# Patient Record
Sex: FEMALE | Race: WHITE | Employment: OTHER | URBAN - METROPOLITAN AREA
[De-identification: names, ages, dates, MRNs, and addresses within clinical notes are randomized per-mention and may not be internally consistent; named-entity substitution may affect disease eponyms.]

---

## 2017-04-03 ENCOUNTER — HOSPITAL ENCOUNTER (OUTPATIENT)
Dept: CT IMAGING | Age: 27
Discharge: HOME | End: 2017-04-03
Attending: OPTOMETRIST
Payer: MEDICAID

## 2017-04-03 ENCOUNTER — APPOINTMENT (OUTPATIENT)
Dept: CT IMAGING | Age: 27
End: 2017-04-03
Attending: OPTOMETRIST
Payer: MEDICAID

## 2017-04-03 DIAGNOSIS — H49.01 THIRD NERVE PALSY OF RIGHT EYE: ICD-10-CM

## 2017-04-03 PROCEDURE — 70496 CT ANGIOGRAPHY HEAD: CPT

## 2017-04-03 PROCEDURE — 74011000258 HC RX REV CODE- 258: Performed by: OPTOMETRIST

## 2017-04-03 PROCEDURE — 74011636320 HC RX REV CODE- 636/320: Performed by: OPTOMETRIST

## 2017-04-03 RX ORDER — SODIUM CHLORIDE 0.9 % (FLUSH) 0.9 %
10 SYRINGE (ML) INJECTION
Status: COMPLETED | OUTPATIENT
Start: 2017-04-03 | End: 2017-04-03

## 2017-04-03 RX ADMIN — IOPAMIDOL 100 ML: 755 INJECTION, SOLUTION INTRAVENOUS at 15:12

## 2017-04-03 RX ADMIN — Medication 10 ML: at 15:12

## 2017-04-03 RX ADMIN — SODIUM CHLORIDE 100 ML: 900 INJECTION, SOLUTION INTRAVENOUS at 15:12

## 2017-09-27 ENCOUNTER — INITIAL PRENATAL (OUTPATIENT)
Dept: MIDWIFE SERVICES | Age: 27
End: 2017-09-27

## 2017-09-27 VITALS
SYSTOLIC BLOOD PRESSURE: 132 MMHG | HEART RATE: 98 BPM | HEIGHT: 62 IN | DIASTOLIC BLOOD PRESSURE: 84 MMHG | BODY MASS INDEX: 31.47 KG/M2 | WEIGHT: 171 LBS

## 2017-09-27 DIAGNOSIS — Z34.81 ENCOUNTER FOR SUPERVISION OF NORMAL PREGNANCY IN MULTIGRAVIDA IN FIRST TRIMESTER: Primary | ICD-10-CM

## 2017-09-27 PROBLEM — Z34.80 NORMAL PREGNANCY IN MULTIGRAVIDA: Status: ACTIVE | Noted: 2017-09-27

## 2017-09-27 LAB
BILIRUB UR QL STRIP: NEGATIVE
GLUCOSE UR-MCNC: NEGATIVE MG/DL
HCG URINE, QL. (POC): POSITIVE
KETONES P FAST UR STRIP-MCNC: NEGATIVE MG/DL
PH UR STRIP: 7.5 [PH] (ref 4.6–8)
PROT UR QL STRIP: NEGATIVE MG/DL
SP GR UR STRIP: 1.01 (ref 1–1.03)
UA UROBILINOGEN AMB POC: NORMAL (ref 0.2–1)
URINALYSIS CLARITY POC: CLEAR
URINALYSIS COLOR POC: YELLOW
URINE BLOOD POC: NEGATIVE
URINE LEUKOCYTES POC: NEGATIVE
URINE NITRITES POC: NEGATIVE
VALID INTERNAL CONTROL?: YES

## 2017-09-27 NOTE — PROGRESS NOTES
Subjective:      Karen Stone is a 32 y.o.  11w2d being seen today for her first obstetrical visit. This is an unprevented pregnancy. She stopped taking her birth control pill 3 months ago and did not start anything else. Estimated Date of Delivery: 18 by sure LMP of 07/10/2017. She is sure of this date and is also sure of conception date which aligns with LMP date. FOB unable to attend with her today. Her last pap was in 2016. She denies ever having an abnormal pap. She denies any recent travel to a zika infected area. Current Medications  PNV        Obstetrical Hx  OB History      Para Term  AB Living    3 2 2   2    SAB TAB Ectopic Molar Multiple Live Births         2        Both pregnancies were IOL at 41w3d. Denies any complications. Denies postpartum hemorrhage. Medical Hx  History reviewed. No pertinent past medical history. 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 Her  has just opened a [employer] restaurant in [facility]. She is in a safe, stable relationship. She denies any alcohol, cigarette or illegal drug use. Objective:     General  alert, no distress    Thyroid  not enlarged, symmetric, no tenderness/mass/nodules    Lungs  clear to auscultation bilaterally    Breasts  no masses, tenderness, nipples intact    Heart  regular rate and rhythm, S1, S2 normal, no murmur, click, rub or gallop    Abdomen  soft, non-tender. No masses, No organomegaly.     Pelvic exam: deferred         Assessment:   32 y.o.  @ 11w2d   Sure LMP  Size=Dates    Plan:     Labs: NOB panel  Prenatal vitamins. Problem list reviewed and updated. Genetic screening options reviewed. At this time the patient declines. Will consider NIPS after 20 week anatomy scan, if any markers are identified then. Centering pregnancy offered. At this time the patient would like to go to session 8 or 9. She is confirming  before committing. Midwifery care/philosophy discussed including MD collaboration  New OB packet given and reviewed, including nutrition, exercise, what to expect at prenatal visits, common complaints, danger signs and practice info.    Zika precautions reviewed  Follow-up in 4 weeks or sooner as needed

## 2017-09-27 NOTE — PROGRESS NOTES
Chief Complaint   Patient presents with    Pregnancy     Visit Vitals    /84    Pulse 98    Ht 5' 2\" (1.575 m)    Wt 171 lb (77.6 kg)    BMI 31.28 kg/m2     1. Have you been to the ER, urgent care clinic since your last visit? Hospitalized since your last visit? No    2. Have you seen or consulted any other health care providers outside of the [address] [place] [address] since your last visit? Include any pap smears or colon screening.  No     Here today for first visit of pregnancy      Verbal order for urine  Culture and G/C per rose marie anders cnm

## 2017-09-27 NOTE — MR AVS SNAPSHOT
Visit Information Date & Time Provider Department Dept. Phone Encounter #  
 9/27/2017 11:00 AM Hobelem CruzMervin [id number] Follow-up Instructions Return in about 4 weeks (around 10/25/2017). Follow-up and Disposition History Upcoming Health Maintenance Date Due  
 PAP AKA CERVICAL CYTOLOGY 9/23/2009 INFLUENZA AGE 9 TO ADULT 8/1/2017 Allergies as of 9/27/2017  Review Complete On: 9/27/2017 By: Ho Cruz CNM Severity Noted Reaction Type Reactions Bee Sting [Sting, Bee] High 09/27/2017    Swelling Current Immunizations  Never Reviewed No immunizations on file. Not reviewed this visit You Were Diagnosed With   
  
 Codes Comments Encounter for supervision of normal pregnancy in multigravida in first trimester    -  Primary ICD-10-CM: Z34.81 ICD-9-CM: V22.1 Vitals BP Pulse Height(growth percentile) Weight(growth percentile) LMP BMI  
 132/84 98 5' 2\" (1.575 m) 171 lb (77.6 kg) 07/10/2017 31.28 kg/m2 OB Status Smoking Status Pregnant Current Some Day Smoker Vitals History BMI and BSA Data Body Mass Index Body Surface Area  
 31.28 kg/m 2 1.84 m 2 Your Updated Medication List  
  
   
This list is accurate as of: 9/27/17  1:18 PM.  Always use your most recent med list.  
  
  
  
  
 PRENATAL PO Take  by mouth. We Performed the Following AMB POC URINALYSIS DIP STICK AUTO W/O MICRO [04221 CPT(R)] AMB POC URINE PREGNANCY TEST, VISUAL COLOR COMPARISON [55013 CPT(R)] CBC WITH AUTOMATED DIFF [54890 CPT(R)] CHLAMYDIA/GC PCR [57400 CPT(R)] CULTURE, URINE Z4805042 CPT(R)] HEP B SURFACE AG [id number] CPT(R)] HIV 1/2 AG/AB, 4TH GENERATION,W RFLX CONFIRM [[id number] Custom] RUBELLA AB, IGG [id number] CPT(R)] T PALLIDUM SCREEN W/REFLEX [[id number] Custom] TYPE & SCREEN [[id number] Custom] Comments: ENTER SURGERY DATE IF FOR PRE-OP TESTING. VITAMIN D, 25 HYDROXY [id number] CPT(R)] Follow-up Instructions Return in about 4 weeks (around 10/25/2017). Patient Instructions Helpful Hints for Decreasing Nausea and Vomiting 
  
Eat small frequent meals (every 2 to 3 hours) Try easily digested carbohydrates such as fruit, juice, rice, potatoes, cereal, bread, crackers and pasta (Well worry about a healthy, balanced diet once the nausea resolves!) Avoid high fat foods (such as butter and gravy) Avoid highly seasoned or spicy foods Drink liquids between meals, rather than with them Eat a snack with protein before bed, and try a carbohydrate snack before getting out of bed (such as dry cereal or crackers that can be stored on your night stand) Limit caffeine and mints (they stimulate gastric secretions) Avoid strong smells Stay sitting up after you eat to let the food digest and reduce heartburn Get up slowly Do not brush your teeth right after you eat, also do not brush first thing in the morning Use a small, soft headed toothbrush and low foam toothpaste to decrease the gag reflex Try Arlin tea or capsules (250 mg four times a day); most commercial ginger ales do not contain real arlin Try Vitamin B6 (100 mg twice a day or 50mg three times a day in addition to the amount in the prenatal vitamin) In addition to Vitamin B6, try doxylamine 1 tablet at bedtime (available in the sleep aid aisle) Try Acupressure bands (like sea-bands) If unable to tolerate prenatal vitamin and/or DHA, take before bed, if still unable to tolerate switch to 2 chewable [place] vitamins daily before bed When you do feel nauseated: rest, slowly sip soda or sports drinks, avoid solids, and open a window or try a fan Call your provider if you can not hold anything down, fluid or food, for 24 hours Introducing hospitals & HEALTH SERVICES!    
 Dereje Whelan introduces Delectable patient portal. Now you can access parts of your medical record, email your doctor's office, and request medication refills online. 1. In your internet browser, go to [email or web address] 2. Click on the First Time User? Click Here link in the Sign In box. You will see the New Member Sign Up page. 3. Enter your Malauzai Software Access Code exactly as it appears below. You will not need to use this code after youve completed the sign-up process. If you do not sign up before the expiration date, you must request a new code. · Malau[employer]ai [employer] Access Code: [email or web address] Expires: 12/26/2017 10:46 AM 
 
4. Enter the last four digits of your Social Security Number (xxxx) and Date of Birth (mm/dd/yyyy) as indicated and click Submit. You will be taken to the next sign-up page. 5. Create a Malauzai Software ID. This will be your Mal[employer] login ID and cannot be changed, so think of one that is secure and easy to remember. 6. Create a Malau[employer]ai [employer] password. You can change your password at any time. 7. Enter your Password Reset Question and Answer. This can be used at a later time if you forget your password. 8. Enter your e-mail address. You will receive e-mail notification when new information is available in [address] 9. Click Sign Up. You can now view and download portions of your medical record. 10. Click the Download Summary menu link to download a portable copy of your medical information. If you have questions, please visit the Frequently Asked Questions section of the Malau[employer]ai [employer] website. Remember, Malauzai Software is NOT to be used for urgent needs. For medical emergencies, dial 911. Now available from your iPhone and Android! Please provide this summary of care documentation to your next provider. Your primary care clinician is listed as NONE. If you have any questions after today's visit, please call [phone].

## 2017-09-27 NOTE — PATIENT INSTRUCTIONS
Helpful Hints for Decreasing Nausea and Vomiting     Eat small frequent meals (every 2 to 3 hours)  Try easily digested carbohydrates such as fruit, juice, rice, potatoes, cereal, bread, crackers and pasta (Well worry about a healthy, balanced diet once the nausea resolves!)  Avoid high fat foods (such as butter and gravy)  Avoid highly seasoned or spicy foods  Drink liquids between meals, rather than with them  Eat a snack with protein before bed, and try a carbohydrate snack before getting out of bed (such as dry cereal or crackers that can be stored on your night stand)  Limit caffeine and mints (they stimulate gastric secretions)  Avoid strong smells  Stay sitting up after you eat to let the food digest and reduce heartburn  Get up slowly  Do not brush your teeth right after you eat, also do not brush first thing in the morning  Use a small, soft headed toothbrush and low foam toothpaste to decrease the gag reflex  Try Arlin tea or capsules (250 mg four times a day); most commercial ginger ales do not contain real arlin  Try Vitamin B6 (100 mg twice a day or 50mg three times a day in addition to the amount in the prenatal vitamin)  In addition to Vitamin B6, try doxylamine 1 tablet at bedtime (available in the sleep aid aisle)  Try Acupressure bands (like sea-bands)   If unable to tolerate prenatal vitamin and/or DHA, take before bed, if still unable to tolerate switch to 2 chewable [place] vitamins daily before bed  When you do feel nauseated: rest, slowly sip soda or sports drinks, avoid solids, and open a window or try a fan   Call your provider if you can not hold anything down, fluid or food, for 24 hours

## 2017-09-29 LAB
BACTERIA UR CULT: NORMAL
C TRACH RRNA SPEC QL NAA+PROBE: NEGATIVE
N GONORRHOEA RRNA SPEC QL NAA+PROBE: NEGATIVE

## 2017-10-10 ENCOUNTER — DOCUMENTATION ONLY (OUTPATIENT)
Dept: OBGYN CLINIC | Age: 27
End: 2017-10-10

## 2017-10-12 ENCOUNTER — DOCUMENTATION ONLY (OUTPATIENT)
Dept: MIDWIFE SERVICES | Age: 27
End: 2017-10-12

## 2017-10-12 DIAGNOSIS — Z34.80 NORMAL PREGNANCY IN MULTIGRAVIDA: ICD-10-CM

## 2017-10-12 NOTE — PROGRESS NOTES
Transfer records from [facility] reviewed. Last pap 03/21/2013. Patient states she had pap done last year. Check with patient to see if she had it done else where. Otherwise is due for pap at postpartum visit.

## 2017-10-25 ENCOUNTER — ROUTINE PRENATAL (OUTPATIENT)
Dept: MIDWIFE SERVICES | Age: 27
End: 2017-10-25

## 2017-10-25 VITALS
WEIGHT: 168.5 LBS | BODY MASS INDEX: 31.01 KG/M2 | SYSTOLIC BLOOD PRESSURE: 122 MMHG | HEIGHT: 62 IN | DIASTOLIC BLOOD PRESSURE: 84 MMHG | HEART RATE: 102 BPM

## 2017-10-25 DIAGNOSIS — Z34.80 NORMAL PREGNANCY IN MULTIGRAVIDA: Primary | ICD-10-CM

## 2017-10-25 LAB
25(OH)D3+25(OH)D2 SERPL-MCNC: 31.5 NG/ML (ref 30–100)
ABO GROUP BLD: NORMAL
BASOPHILS # BLD AUTO: 0 X10E3/UL (ref 0–0.2)
BASOPHILS NFR BLD AUTO: 0 %
BLD GP AB SCN SERPL QL: NEGATIVE
EOSINOPHIL # BLD AUTO: 0.3 X10E3/UL (ref 0–0.4)
EOSINOPHIL NFR BLD AUTO: 2 %
ERYTHROCYTE [DISTWIDTH] IN BLOOD BY AUTOMATED COUNT: 14 % (ref 12.3–15.4)
HBV SURFACE AG SERPL QL IA: NEGATIVE
HCT VFR BLD AUTO: 38.4 % (ref 34–46.6)
HGB BLD-MCNC: 13.3 G/DL (ref 11.1–15.9)
HIV 1+2 AB+HIV1 P24 AG SERPL QL IA: NON REACTIVE
IMM GRANULOCYTES # BLD: 0.1 X10E3/UL (ref 0–0.1)
IMM GRANULOCYTES NFR BLD: 1 %
LYMPHOCYTES # BLD AUTO: 2.8 X10E3/UL (ref 0.7–3.1)
LYMPHOCYTES NFR BLD AUTO: 22 %
MCH RBC QN AUTO: 32.4 PG (ref 26.6–33)
MCHC RBC AUTO-ENTMCNC: 34.6 G/DL (ref 31.5–35.7)
MCV RBC AUTO: 94 FL (ref 79–97)
MONOCYTES # BLD AUTO: 0.7 X10E3/UL (ref 0.1–0.9)
MONOCYTES NFR BLD AUTO: 6 %
NEUTROPHILS # BLD AUTO: 8.5 X10E3/UL (ref 1.4–7)
NEUTROPHILS NFR BLD AUTO: 69 %
PLATELET # BLD AUTO: 317 X10E3/UL (ref 150–379)
RBC # BLD AUTO: 4.1 X10E6/UL (ref 3.77–5.28)
RH BLD: POSITIVE
RUBV IGG SERPL IA-ACNC: 2.64 INDEX
T PALLIDUM AB SER QL IA: NEGATIVE
WBC # BLD AUTO: 12.3 X10E3/UL (ref 3.4–10.8)

## 2017-10-25 NOTE — PROGRESS NOTES
Chief Complaint   Patient presents with    Routine Prenatal Visit     15w2d     Visit Vitals    /84    Pulse (!) 102    Ht 5' 2\" (1.575 m)    Wt 168 lb 8 oz (76.4 kg)    BMI 30.82 kg/m2     1. Have you been to the ER, urgent care clinic since your last visit? Hospitalized since your last visit? No    2. Have you seen or consulted any other health care providers outside of the [address] [place] [address] since your last visit? Include any pap smears or colon screening. No     Here today for a routine prenatal visit and she has no complaints or concerns.

## 2017-10-25 NOTE — PROGRESS NOTES
S: Feeling well. No significant complaints or concerns. Reports consistent, daily fetal mvmt. Denies ctxs, LOF, or vaginal bldng. Did travel to [facility] Oct 8th, denies mosquito bites or symptoms. Was seen at PCP recently and diagnosed with virus. Son also sick. Patient c/o nose running and coughing up mucous in the morning. Denies being up with coughing, throat not hurting anymore, is having headaches (frontal). No fever. Drinking well but not eating well. Morning sickness now resolved. O: See prenatal flowsheet for maternal and fetal exam  Blood pressure 122/84, pulse (!) 102, height 5' 2\" (1.575 m), weight 168 lb 8 oz (76.4 kg), last menstrual period 07/10/2017.     A:  15w2d  Size=Dates  Viral head cold    P: Recommend flu vaccine when well- pt states she plans to decline  Discussed OTC Tylenol products for symptom relief, humidifier in bedroom, saline nose drops and/or nettipot  Recommend increasing Vitamin D, level low normal  Anatomy scan scheduled at [facility] 9038 4 weeks  See prenatal checklist for other topics covered during today's visit  RTO in 4 weeks for CHRISTEN with OLIVER

## 2017-10-25 NOTE — MR AVS SNAPSHOT
Visit Information Date & Time Provider Department Dept. Phone Encounter #  
 10/25/2017 11:00 AM Gretchen Zacarias, [facility] [address] [id number] Upcoming Health Maintenance Date Due  
 PAP AKA CERVICAL CYTOLOGY 9/23/2009 INFLUENZA AGE 9 TO ADULT 8/1/2017 Allergies as of 10/25/2017  Review Complete On: 10/25/2017 By: Gretchen Zacarias CNM Severity Noted Reaction Type Reactions Bee Sting [Sting, Bee] High 09/27/2017    Swelling Current Immunizations  Never Reviewed No immunizations on file. Not reviewed this visit You Were Diagnosed With   
  
 Codes Comments Normal pregnancy in multigravida    -  Primary ICD-10-CM: Z34.80 ICD-9-CM: V22.1 Vitals BP Pulse Height(growth percentile) Weight(growth percentile) LMP BMI  
 122/84 (!) 102 5' 2\" (1.575 m) 168 lb 8 oz (76.4 kg) 07/10/2017 30.82 kg/m2 OB Status Smoking Status Pregnant Current Some Day Smoker BMI and BSA Data Body Mass Index Body Surface Area  
 30.82 kg/m 2 1.83 m 2 Your Updated Medication List  
  
   
This list is accurate as of: 10/25/17 11:44 AM.  Always use your most recent med list.  
  
  
  
  
 PRENATAL PO Take  by mouth. Patient Instructions Vitamin D3 2000- 4000 international units-  Daily in addition to prenatal vitamin Introducing [facility] & HEALTH SERVICES! Nicci Garcia introduces [employer] patient portal. Now you can access parts of your medical record, email your doctor's office, and request medication refills online. 1. In your internet browser, go to [email or web address] 2. Click on the First Time User? Click Here link in the Sign In box. You will see the New Member Sign Up page. 3. Enter your [employer] Access Code exactly as it appears below. You will not need to use this code after youve completed the sign-up process.  If you do not sign up before the expiration date, you must request a new code. · Redux [employer] Access Code: [email or web address] Expires: 12/26/2017 10:46 AM 
 
4. Enter the last four digits of your Social Security Number (xxxx) and Date of Birth (mm/dd/yyyy) as indicated and click Submit. You will be taken to the next sign-up page. 5. Create a Redux Technologies ID. This will be your Redux [employer] login ID and cannot be changed, so think of one that is secure and easy to remember. 6. Create a [employer] password. You can change your password at any time. 7. Enter your Password Reset Question and Answer. This can be used at a later time if you forget your password. 8. Enter your e-mail address. You will receive e-mail notification when new information is available in [address] 9. Click Sign Up. You can now view and download portions of your medical record. 10. Click the Download Summary menu link to download a portable copy of your medical information. If you have questions, please visit the Frequently Asked Questions section of the [employer] website. Remember, [employer] is NOT to be used for urgent needs. For medical emergencies, dial 911. Now available from your iPhone and Android! Please provide this summary of care documentation to your next provider. Your primary care clinician is listed as NONE. If you have any questions after today's visit, please call [phone].

## 2017-11-22 ENCOUNTER — ROUTINE PRENATAL (OUTPATIENT)
Dept: MIDWIFE SERVICES | Age: 27
End: 2017-11-22

## 2017-11-22 VITALS
HEIGHT: 62 IN | WEIGHT: 171.5 LBS | HEART RATE: 120 BPM | DIASTOLIC BLOOD PRESSURE: 78 MMHG | SYSTOLIC BLOOD PRESSURE: 123 MMHG | BODY MASS INDEX: 31.56 KG/M2

## 2017-11-22 DIAGNOSIS — Z34.80 ENCOUNTER FOR SUPERVISION OF NORMAL PREGNANCY IN MULTIGRAVIDA, ANTEPARTUM: Primary | ICD-10-CM

## 2017-11-22 NOTE — PROGRESS NOTES
S: Feeling well. No significant complaints or concerns. Reports consistent, daily fetal mvmt, less then with daughter, likely due to anterior placenta this time. Denies ctxs, LOF, or vaginal bldng. Denies travel to [facility] affected area. O: See prenatal flowsheet for maternal and fetal exam  Blood pressure 123/78, pulse (!) 120, height 5' 2\" (1.575 m), weight 171 lb 8 oz (77.8 kg), last menstrual period 07/10/2017. Discussed recommendation for flu vaccination during pregnancy including flu is more likely to cause severe illness in pregnant women than in women who are not pregnant,  getting a flu shot is the first and most important step in protecting against flu, the flu shot given during pregnancy has been shown to protect both the mother and her baby (up to 7 months old) from flu, and flu shots are a safe way to protect the mother and her unborn child from serious illness and complications of flu. Pt declines flu vaccine today. A:  19w2d   Size=Dates    P:  Labs reviewed. AFP/ genetics declined. Ultrasound report reviewed.   See prenatal checklist for other topics covered during today's visit  RTO in  4weeks for CHRISTEN with OLIVER

## 2017-11-22 NOTE — PROGRESS NOTES
Chief Complaint   Patient presents with    Routine Prenatal Visit     19w2d     Visit Vitals    /78    Pulse (!) 120    Ht 5' 2\" (1.575 m)    Wt 171 lb 8 oz (77.8 kg)    BMI 31.37 kg/m2     1. Have you been to the ER, urgent care clinic since your last visit? Hospitalized since your last visit? No    2. Have you seen or consulted any other health care providers outside of the [address] [place] [address] since your last visit? Include any pap smears or colon screening. No     Here today for a routine prenatal visit and she has no complaints or concerns.

## 2017-11-22 NOTE — MR AVS SNAPSHOT
Visit Information Date & Time Provider Department Dept. Phone Encounter #  
 11/22/2017  1:30 PM Denise Cass, [address] [facility] [place] [phone] Upcoming Health Maintenance Date Due  
 PAP AKA CERVICAL CYTOLOGY 9/23/2009 Influenza Age 5 to Adult 8/1/2017 Allergies as of 11/22/2017  Review Complete On: 11/22/2017 By: Denise Odell CNM Severity Noted Reaction Type Reactions Bee Sting [Sting, Bee] High 09/27/2017    Swelling Current Immunizations  Never Reviewed No immunizations on file. Not reviewed this visit You Were Diagnosed With   
  
 Codes Comments Encounter for supervision of normal pregnancy in multigravida, antepartum    -  Primary ICD-10-CM: Z34.80 ICD-9-CM: V22.1 Vitals BP Pulse Height(growth percentile) Weight(growth percentile) LMP BMI  
 123/78 (!) 120 5' 2\" (1.575 m) 171 lb 8 oz (77.8 kg) 07/10/2017 31.37 kg/m2 OB Status Smoking Status Pregnant Former Smoker BMI and BSA Data Body Mass Index Body Surface Area  
 31.37 kg/m 2 1.84 m 2 Your Updated Medication List  
  
   
This list is accurate as of: 11/22/17  2:06 PM.  Always use your most recent med list.  
  
  
  
  
 PRENATAL PO Take  by mouth. Introducing [facility] & HEALTH SERVICES! [facility] introduces ePrivate[employer] patient portal. Now you can access parts of your medical record, email your doctor's office, and request medication refills online. 1. In your internet browser, go to [email or web address] 2. Click on the First Time User? Click Here link in the Sign In box. You will see the New Member Sign Up page. 3. Enter your ePrivateH[employer] Access Code exactly as it appears below. You will not need to use this code after youve completed the sign-up process. If you do not sign up before the expiration date, you must request a new code. · [employer] Access Code: [email or web address] Expires: 12/26/2017  9:46 AM 
 
4. Enter the last four digits of your Social Security Number (xxxx) and Date of Birth (mm/dd/yyyy) as indicated and click Submit. You will be taken to the next sign-up page. 5. Create a Tastemade ID. This will be your Tastemade login ID and cannot be changed, so think of one that is secure and easy to remember. 6. Create a Tastemade password. You can change your password at any time. 7. Enter your Password Reset Question and Answer. This can be used at a later time if you forget your password. 8. Enter your e-mail address. You will receive e-mail notification when new information is available in [address] 9. Click Sign Up. You can now view and download portions of your medical record. 10. Click the Download Summary menu link to download a portable copy of your medical information. If you have questions, please visit the Frequently Asked Questions section of the Tastemade website. Remember, Tastemade is NOT to be used for urgent needs. For medical emergencies, dial 911. Now available from your iPhone and Android! Please provide this summary of care documentation to your next provider. Your primary care clinician is listed as NONE. If you have any questions after today's visit, please call [phone].

## 2017-12-18 ENCOUNTER — ROUTINE PRENATAL (OUTPATIENT)
Dept: MIDWIFE SERVICES | Age: 27
End: 2017-12-18

## 2017-12-18 VITALS
BODY MASS INDEX: 32.02 KG/M2 | SYSTOLIC BLOOD PRESSURE: 128 MMHG | HEIGHT: 62 IN | DIASTOLIC BLOOD PRESSURE: 78 MMHG | WEIGHT: 174 LBS | HEART RATE: 112 BPM

## 2017-12-18 DIAGNOSIS — Z34.80 ENCOUNTER FOR SUPERVISION OF NORMAL PREGNANCY IN MULTIGRAVIDA, ANTEPARTUM: Primary | ICD-10-CM

## 2017-12-18 NOTE — PATIENT INSTRUCTIONS
Thank you for choosing [facility]. We know you have many options when it comes to your healthcare; we appreciate that you picked us. Our goal is to provide exceptional service and world class care for every patient. You may receive a survey in the mail or by email asking for your feedback. Please take a few minutes to share your thoughts about your recent visit. Your comments helps us understand what we do well and what we can do better. To ensure confidentiality, this survey is administered by an independent third-party, [address] [place] [address] participation will help us to continue and improve the quality of care that we provide to you, your family, friends, and neighbors. Thank you! Weeks 22 to 26 of Your Pregnancy: Care Instructions  Your Care Instructions    As you enter your 7th month of pregnancy at week 26, your baby's lungs are growing stronger and getting ready to breathe. You may notice that your baby responds to the sound of your or your partner's voice. You may also notice that your baby does less turning and twisting and more squirming or jerking. Jerking often means that your baby has the hiccups. Hiccups are perfectly normal and are only temporary. You may want to think about attending a childbirth preparation class. This is also a good time to start thinking about whether you want to have pain medicine during labor. Most pregnant women are tested for gestational diabetes between weeks 25 and 28. Gestational diabetes occurs when your blood sugar level gets too high when you're pregnant. The test is important, because you can have gestational diabetes and not know it. But the condition can cause problems for your baby. Follow-up care is a key part of your treatment and safety. Be sure to make and go to all appointments, and call your doctor if you are having problems.  It's also a good idea to know your test results and keep a list of the medicines you take.  How can you care for yourself at home? Ease discomfort from your baby's kicking  · Change your position. Sometimes this will cause your baby to change position too. · Take a deep breath while you raise your arm over your head. Then breathe out while you drop your arm. Do Kegel exercises to prevent urine from leaking  · You can do Kegel exercises while you stand or sit. ¨ Squeeze the same muscles you would use to stop your urine. Your belly and thighs should not move. ¨ Hold the squeeze for 3 seconds, and then relax for 3 seconds. ¨ Start with 3 seconds. Then add 1 second each week until you are able to squeeze for 10 seconds. ¨ Repeat the exercise 10 to 15 times for each session. Do three or more sessions each day. Ease or reduce swelling in your feet, ankles, hands, and fingers  · If your fingers are puffy, take off your rings. · Do not eat high-salt foods, such as potato chips. · Prop up your feet on a stool or couch as much as possible. Sleep with pillows under your feet. · Do not stand for long periods of time or wear tight shoes. · Wear support stockings. Where can you learn more? Go to http://henrique-cesar.info/. Enter G264 in the search box to learn more about \"Weeks 22 to 26 of Your Pregnancy: Care Instructions. \"  Current as of: March 16, 2017  Content Version: 11.4  © 1987-4305 [employer]. Care instructions adapted under license by [employer] (which disclaims liability or warranty for this information). If you have questions about a medical condition or this instruction, always ask your healthcare professional. [place] [address] any warranty or liability for your use of this information.

## 2017-12-18 NOTE — PROGRESS NOTES
S: Feeling well. No significant complaints or concerns. Reports consistent, daily fetal mvmt. Feeling more movement since last visit. Denies ctxs, LOF, or vaginal bldng. Denies travel to [facility] affected area. O: See prenatal flowsheet for maternal and fetal exam. Will plan 28 week labs at next visit, reviewed low carb/glucose diet prior to next appointment. Blood pressure 128/78, pulse (!) 112, height 5' 2\" (1.575 m), weight 174 lb (78.9 kg), last menstrual period 07/10/2017. A:G3   23w0d  Size=Dates    P: reviewed 28 week labs at next visit.  Low carb intake prior to appointment,   Continues to decline flu vaccine  See prenatal checklist for other topics covered during today's visit  RTO in 4 weeks for CHRISTEN with OLIVER

## 2017-12-18 NOTE — MR AVS SNAPSHOT
Visit Information Date & Time Provider Department Dept. Phone Encounter #  
 12/18/2017 11:00 AM Mervin Kenyon [id number] Upcoming Health Maintenance Date Due  
 PAP AKA CERVICAL CYTOLOGY 9/23/2009 Influenza Age 5 to Adult 8/1/2017 Allergies as of 12/18/2017  Review Complete On: 12/18/2017 By: Caroline Chadwick CNM Severity Noted Reaction Type Reactions Bee Sting [Sting, Bee] High 09/27/2017    Swelling Current Immunizations  Never Reviewed No immunizations on file. Not reviewed this visit You Were Diagnosed With   
  
 Codes Comments Encounter for supervision of normal pregnancy in multigravida, antepartum    -  Primary ICD-10-CM: Z34.80 ICD-9-CM: V22.1 Vitals BP Pulse Height(growth percentile) Weight(growth percentile) LMP BMI  
 128/78 (!) 112 5' 2\" (1.575 m) 174 lb (78.9 kg) 07/10/2017 31.83 kg/m2 OB Status Smoking Status Pregnant Former Smoker BMI and BSA Data Body Mass Index Body Surface Area  
 31.83 kg/m 2 1.86 m 2 Your Updated Medication List  
  
   
This list is accurate as of: 12/18/17 11:26 AM.  Always use your most recent med list.  
  
  
  
  
 PRENATAL PO Take  by mouth. Introducing [facility]! [facility] introduces Entegrion patient portal. Now you can access parts of your medical record, email your doctor's office, and request medication refills online. 1. In your internet browser, go to [email or web address] 2. Click on the First Time User? Click Here link in the Sign In box. You will see the New Member Sign Up page. 3. Enter your Entegrion Access Code exactly as it appears below. You will not need to use this code after youve completed the sign-up process. If you do not sign up before the expiration date, you must request a new code. · Entegrion Access Code: [email or web address] Expires: 12/26/2017  9:46 AM 
 
4. Enter the last four digits of your Social Security Number (xxxx) and Date of Birth (mm/dd/yyyy) as indicated and click Submit. You will be taken to the next sign-up page. 5. Create a [employer] Technologies ID. This will be your [employer] login ID and cannot be changed, so think of one that is secure and easy to remember. 6. Create a [employer] password. You can change your password at any time. 7. Enter your Password Reset Question and Answer. This can be used at a later time if you forget your password. 8. Enter your e-mail address. You will receive e-mail notification when new information is available in [address] 9. Click Sign Up. You can now view and download portions of your medical record. 10. Click the Download Summary menu link to download a portable copy of your medical information. If you have questions, please visit the Frequently Asked Questions section of the [employer] website. Remember, [employer] is NOT to be used for urgent needs. For medical emergencies, dial 911. Now available from your iPhone and Android! Please provide this summary of care documentation to your next provider. Your primary care clinician is listed as NONE. If you have any questions after today's visit, please call [phone].

## 2017-12-18 NOTE — PROGRESS NOTES
Chief Complaint   Patient presents with    Routine Prenatal Visit     23 weeks     Visit Vitals    /78    Pulse (!) 112    Ht 5' 2\" (1.575 m)    Wt 174 lb (78.9 kg)    BMI 31.83 kg/m2     1. Have you been to the ER, urgent care clinic since your last visit? Hospitalized since your last visit? No    2. Have you seen or consulted any other health care providers outside of the [address] [place] [address] since your last visit? Include any pap smears or colon screening. No     Here today for a routine prenatal visit and she has no complaints or concerns.

## 2018-01-15 ENCOUNTER — ROUTINE PRENATAL (OUTPATIENT)
Dept: MIDWIFE SERVICES | Age: 28
End: 2018-01-15

## 2018-01-15 VITALS
HEART RATE: 108 BPM | BODY MASS INDEX: 32.3 KG/M2 | WEIGHT: 175.5 LBS | HEIGHT: 62 IN | SYSTOLIC BLOOD PRESSURE: 116 MMHG | DIASTOLIC BLOOD PRESSURE: 76 MMHG

## 2018-01-15 DIAGNOSIS — Z34.80 ENCOUNTER FOR SUPERVISION OF NORMAL PREGNANCY IN MULTIGRAVIDA, ANTEPARTUM: ICD-10-CM

## 2018-01-15 DIAGNOSIS — Z34.80 ENCOUNTER FOR SUPERVISION OF NORMAL PREGNANCY IN MULTIGRAVIDA, ANTEPARTUM: Primary | ICD-10-CM

## 2018-01-15 NOTE — PROGRESS NOTES
Chief Complaint   Patient presents with    Routine Prenatal Visit     27 weeks     Visit Vitals    /76    Pulse (!) 108    Ht 5' 2\" (1.575 m)    Wt 175 lb 8 oz (79.6 kg)    BMI 32.1 kg/m2     1. Have you been to the ER, urgent care clinic since your last visit? Hospitalized since your last visit? No    2. Have you seen or consulted any other health care providers outside of the [address] [place] [address] since your last visit? Include any pap smears or colon screening. No     Here today for a routine prenatal visit and she has no complaints or concerns.

## 2018-01-15 NOTE — PROGRESS NOTES
S: Feeling well. No significant complaints or concerns. Reports consistent, daily fetal mvmt. Denies ctxs, LOF, or vaginal bldng. Denies travel to [place] affected area. Discussed USPFT recommendation for Tdap during every pregnancy, optimal timing of administration between 27 to 36 weeks gestation-although can be done at any time during pregnancy, and that people that will be in close contact with her infant during the first year should also receive a Tdap vaccine if they have not previously received the Tdap vaccine. Pt states understanding. Pt to consider Tdap vaccine. Will consider for next visit. O: See prenatal flowsheet for maternal and fetal exam  Blood pressure 116/76, pulse (!) 108, height 5' 2\" (1.575 m), weight 175 lb 8 oz (79.6 kg), last menstrual period 07/10/2017. Drank glucola in office at 0935 , sent down to [facility] at [phone] to have blood drawn for 1 hr GCT, ABS and CBC at 1035 . A:  27w0d  Size=Dates    P: will consider TDAP for next visit.   28 week labs today  Continues to decline flu vaccine  See prenatal checklist for other topics covered during today's visit  RTO in 2 weeks for CHRISTEN with OLIVER

## 2018-01-15 NOTE — MR AVS SNAPSHOT
Visit Information Date & Time Provider Department Dept. Phone Encounter #  
 1/15/2018  9:30 AM Houston RussellMervin 6 [id number] Follow-up Instructions Return in about 2 weeks (around 1/29/2018) for with OLIVER. Follow-up and Disposition History Upcoming Health Maintenance Date Due  
 PAP AKA CERVICAL CYTOLOGY 9/23/2009 Influenza Age 5 to Adult 8/1/2017 OB 3RD TRIMESTER TDAP 1/15/2018 Allergies as of 1/15/2018  Review Complete On: 1/15/2018 By: Houston Russell CNM Severity Noted Reaction Type Reactions Bee Sting [Sting, Bee] High 09/27/2017    Swelling Current Immunizations  Never Reviewed No immunizations on file. Not reviewed this visit You Were Diagnosed With   
  
 Codes Comments Encounter for supervision of normal pregnancy in multigravida, antepartum    -  Primary ICD-10-CM: Z34.80 ICD-9-CM: V22.1 Vitals BP Pulse Height(growth percentile) Weight(growth percentile) LMP BMI  
 116/76 (!) 108 5' 2\" (1.575 m) 175 lb 8 oz (79.6 kg) 07/10/2017 32.1 kg/m2 OB Status Smoking Status Pregnant Former Smoker BMI and BSA Data Body Mass Index Body Surface Area  
 32.1 kg/m 2 1.87 m 2 Your Updated Medication List  
  
   
This list is accurate as of: 1/15/18  9:57 AM.  Always use your most recent med list.  
  
  
  
  
 PRENATAL PO Take  by mouth. We Performed the Following CBC W/O DIFF [20283 CPT(R)] Follow-up Instructions Return in about 2 weeks (around 1/29/2018) for with OLIVER. To-Do List   
 01/15/2018 Lab:  GLUCOSE, GESTATIONAL 1 HR TOLERANCE Patient Instructions Thank you for choosing [address] [place] [address]. We know you have many options when it comes to your healthcare; we appreciate that you picked us.  Our goal is to provide exceptional service and world class care for every patient. You may receive a survey in the mail or by email asking for your feedback. Please take a few minutes to share your thoughts about your recent visit. Your comments helps us understand what we do well and what we can do better. To ensure confidentiality, this survey is administered by an independent third-party, [facility] [place] participation will help us to continue and improve the quality of care that we provide to you, your family, friends, and neighbors. Thank you! Weeks 26 to 30 of Your Pregnancy: Care Instructions Your Care Instructions You are now in your last trimester of pregnancy. Your baby is growing rapidly. And you'll probably feel your baby moving around more often. Your doctor may ask you to count your baby's kicks. Your back may ache as your body gets used to your baby's size and length. If you haven't already had the Tdap shot during this pregnancy, talk to your doctor about getting it. It will help protect your  against pertussis infection. During this time, it's important to take care of yourself and pay attention to what your body needs. If you feel sexual, explore ways to be close with your partner that match your comfort and desire. Use the tips provided in this care sheet to find ways to be sexual in your own way. Follow-up care is a key part of your treatment and safety. Be sure to make and go to all appointments, and call your doctor if you are having problems. It's also a good idea to know your test results and keep a list of the medicines you take. How can you care for yourself at home? Take it easy at work · Take frequent breaks. If possible, stop working when you are tired, and rest during your lunch hour. · Take bathroom breaks every 2 hours. · Change positions often. If you sit for long periods, stand up and walk around.  
· When you stand for a long time, keep one foot on a low stool with your knee bent. After standing a lot, sit with your feet up. · Avoid fumes, chemicals, and tobacco smoke. Be sexual in your own way · Having sex during pregnancy is okay, unless your doctor tells you not to. · You may be very interested in sex, or you may have no interest at all. · Your growing belly can make it hard to find a good position during intercourse. [place] and explore. · You may get cramps in your uterus when your partner touches your breasts. · A back rub may relieve the backache or cramps that sometimes follow orgasm. Learn about  labor · Watch for signs of  labor. You may be going into labor if: 
¨ You have menstrual-like cramps, with or without nausea. ¨ You have about 4 or more contractions in 20 minutes, or about 8 or more within 1 hour, even after you have had a glass of water and are resting. ¨ You have a low, dull backache that does not go away when you change your position. ¨ You have pain or pressure in your pelvis that comes and goes in a pattern. ¨ You have intestinal cramping or flu-like symptoms, with or without diarrhea. ¨ You notice an increase or change in your vaginal discharge. Discharge may be heavy, mucus-like, watery, or streaked with blood. ¨ Your water breaks. · If you think you have  labor: ¨ Drink 2 or 3 glasses of water or juice. Not drinking enough fluids can cause contractions. ¨ Stop what you are doing, and empty your bladder. Then lie down on your left side for at least 1 hour. ¨ While lying on your side, find your breast bone. Put your fingers in the soft spot just below it. Move your fingers down toward your belly button to find the top of your uterus. Check to see if it is tight. ¨ Contractions can be weak or strong. Record your contractions for an hour. Time a contraction from the start of one contraction to the start of the next one.  
¨ Single or several strong contractions without a pattern are called Aman-Reddy contractions. They are practice contractions but not the start of labor. They often stop if you change what you are doing. ¨ Call your doctor if you have regular contractions. Where can you learn more? Go to http://henrique-cesar.info/. Enter H329 in the search box to learn more about \"Weeks 26 to 30 of Your Pregnancy: Care Instructions. \" Current as of: March 16, 2017 Content Version: 11.4 © 2911-4560 [employer]. Care instructions adapted under license by [employer] (which disclaims liability or warranty for this information). If you have questions about a medical condition or this instruction, always ask your healthcare professional. [address] any warranty or liability for your use of this information. Introducing [facility]! Alfie Garcia introduces BlogGlue patient portal. Now you can access parts of your medical record, email your doctor's office, and request medication refills online. 1. In your internet browser, go to [email or web address] 2. Click on the First Time User? Click Here link in the Sign In box. You will see the New Member Sign Up page. 3. Enter your BlogGlue Access Code exactly as it appears below. You will not need to use this code after youve completed the sign-up process. If you do not sign up before the expiration date, you must request a new code. · BlogGlue Access Code: [email or web address] Expires: 4/15/2018  9:57 AM 
 
4. Enter the last four digits of your Social Security Number (xxxx) and Date of Birth (mm/dd/yyyy) as indicated and click Submit. You will be taken to the next sign-up page. 5. Create a BlogGlue ID. This will be your BlogGlue login ID and cannot be changed, so think of one that is secure and easy to remember. 6. Create a BlogGlue password. You can change your password at any time. 7. Enter your Password Reset Question and Answer.  This can be used at a later time if you forget your password. 8. Enter your e-mail address. You will receive e-mail notification when new information is available in [address] 9. Click Sign Up. You can now view and download portions of your medical record. 10. Click the Download Summary menu link to download a portable copy of your medical information. If you have questions, please visit the Frequently Asked Questions section of the [employer] website. Remember, [employer] is NOT to be used for urgent needs. For medical emergencies, dial 911. Now available from your iPhone and Android! Please provide this summary of care documentation to your next provider. Your primary care clinician is listed as NONE. If you have any questions after today's visit, please call [phone].

## 2018-01-15 NOTE — PATIENT INSTRUCTIONS
Thank you for choosing [facility]. We know you have many options when it comes to your healthcare; we appreciate that you picked us. Our goal is to provide exceptional service and world class care for every patient. You may receive a survey in the mail or by email asking for your feedback. Please take a few minutes to share your thoughts about your recent visit. Your comments helps us understand what we do well and what we can do better. To ensure confidentiality, this survey is administered by an independent third-party, [employer] participation will help us to continue and improve the quality of care that we provide to you, your family, friends, and neighbors. Thank you! Weeks 26 to 30 of Your Pregnancy: Care Instructions  Your Care Instructions    You are now in your last trimester of pregnancy. Your baby is growing rapidly. And you'll probably feel your baby moving around more often. Your doctor may ask you to count your baby's kicks. Your back may ache as your body gets used to your baby's size and length. If you haven't already had the Tdap shot during this pregnancy, talk to your doctor about getting it. It will help protect your  against pertussis infection. During this time, it's important to take care of yourself and pay attention to what your body needs. If you feel sexual, explore ways to be close with your partner that match your comfort and desire. Use the tips provided in this care sheet to find ways to be sexual in your own way. Follow-up care is a key part of your treatment and safety. Be sure to make and go to all appointments, and call your doctor if you are having problems. It's also a good idea to know your test results and keep a list of the medicines you take. How can you care for yourself at home? Take it easy at work  · Take frequent breaks.  If possible, stop working when you are tired, and rest during your lunch hour.  · Take bathroom breaks every 2 hours. · Change positions often. If you sit for long periods, stand up and walk around. · When you stand for a long time, keep one foot on a low stool with your knee bent. After standing a lot, sit with your feet up. · Avoid fumes, chemicals, and tobacco smoke. Be sexual in your own way  · Having sex during pregnancy is okay, unless your doctor tells you not to. · You may be very interested in sex, or you may have no interest at all. · Your growing belly can make it hard to find a good position during intercourse. [place] and explore. · You may get cramps in your uterus when your partner touches your breasts. · A back rub may relieve the backache or cramps that sometimes follow orgasm. Learn about  labor  · Watch for signs of  labor. You may be going into labor if:  ¨ You have menstrual-like cramps, with or without nausea. ¨ You have about 4 or more contractions in 20 minutes, or about 8 or more within 1 hour, even after you have had a glass of water and are resting. ¨ You have a low, dull backache that does not go away when you change your position. ¨ You have pain or pressure in your pelvis that comes and goes in a pattern. ¨ You have intestinal cramping or flu-like symptoms, with or without diarrhea. ¨ You notice an increase or change in your vaginal discharge. Discharge may be heavy, mucus-like, watery, or streaked with blood. ¨ Your water breaks. · If you think you have  labor:  ¨ Drink 2 or 3 glasses of water or juice. Not drinking enough fluids can cause contractions. ¨ Stop what you are doing, and empty your bladder. Then lie down on your left side for at least 1 hour. ¨ While lying on your side, find your breast bone. Put your fingers in the soft spot just below it. Move your fingers down toward your belly button to find the top of your uterus. Check to see if it is tight. ¨ Contractions can be weak or strong.  Record your contractions for an hour. Time a contraction from the start of one contraction to the start of the next one. ¨ Single or several strong contractions without a pattern are called Imperial-Reddy contractions. They are practice contractions but not the start of labor. They often stop if you change what you are doing. ¨ Call your doctor if you have regular contractions. Where can you learn more? Go to http://henrique-cesar.info/. Enter N577 in the search box to learn more about \"Weeks 26 to 30 of Your Pregnancy: Care Instructions. \"  Current as of: March 16, 2017  Content Version: 11.4  © 4393-4371 [address]. Care instructions adapted under license by [employer] (which disclaims liability or warranty for this information). If you have questions about a medical condition or this instruction, always ask your healthcare professional. [address] any warranty or liability for your use of this information.

## 2018-01-16 LAB
ERYTHROCYTE [DISTWIDTH] IN BLOOD BY AUTOMATED COUNT: 14.2 % (ref 12.3–15.4)
GLUCOSE 1H P 50 G GLC PO SERPL-MCNC: 99 MG/DL (ref 65–139)
HCT VFR BLD AUTO: 34.6 % (ref 34–46.6)
HGB BLD-MCNC: 11.8 G/DL (ref 11.1–15.9)
MCH RBC QN AUTO: 33.2 PG (ref 26.6–33)
MCHC RBC AUTO-ENTMCNC: 34.1 G/DL (ref 31.5–35.7)
MCV RBC AUTO: 98 FL (ref 79–97)
PLATELET # BLD AUTO: 316 X10E3/UL (ref 150–379)
RBC # BLD AUTO: 3.55 X10E6/UL (ref 3.77–5.28)
WBC # BLD AUTO: 12.7 X10E3/UL (ref 3.4–10.8)

## 2018-01-29 ENCOUNTER — ROUTINE PRENATAL (OUTPATIENT)
Dept: MIDWIFE SERVICES | Age: 28
End: 2018-01-29

## 2018-01-29 VITALS
HEART RATE: 104 BPM | DIASTOLIC BLOOD PRESSURE: 80 MMHG | BODY MASS INDEX: 32.2 KG/M2 | WEIGHT: 175 LBS | HEIGHT: 62 IN | SYSTOLIC BLOOD PRESSURE: 120 MMHG

## 2018-01-29 DIAGNOSIS — Z34.83 ENCOUNTER FOR SUPERVISION OF OTHER NORMAL PREGNANCY IN THIRD TRIMESTER: Primary | ICD-10-CM

## 2018-01-29 NOTE — PROGRESS NOTES
Chief Complaint   Patient presents with    Routine Prenatal Visit     29 weeks     Visit Vitals    /80    Pulse (!) 104    Ht 5' 2\" (1.575 m)    Wt 175 lb (79.4 kg)    BMI 32.01 kg/m2     1. Have you been to the ER, urgent care clinic since your last visit? Hospitalized since your last visit? No    2. Have you seen or consulted any other health care providers outside of the [address] [place] [address] since your last visit? Include any pap smears or colon screening. No     Here today for a routine prenatal visit and she has no complaints or concerns. She is just getting over the flu and wants to pass on the tdap shot today.

## 2018-01-29 NOTE — PATIENT INSTRUCTIONS
Weeks 30 to 32 of Your Pregnancy: Care Instructions  Your Care Instructions    You have made it to the final months of your pregnancy. By now, your baby is really starting to look like a baby, with hair and plump skin. As you enter the final weeks of pregnancy, the reality of having a baby may start to set in. This is the time to settle on a name, get your household in order, set up a safe nursery, and find quality  if needed. Doing these things in advance will allow you to focus on caring for and enjoying your new baby. You may also want to have a tour of your hospital's labor and delivery unit to get a better idea of what to expect while you are in the hospital.  During these last months, it is very important to take good care of yourself and pay attention to what your body needs. If your doctor says it is okay for you to work, don't push yourself too hard. Use the tips provided in this care sheet to ease heartburn and care for varicose veins. If you haven't already had the Tdap shot during this pregnancy, talk to your doctor about getting it. It will help protect your  against pertussis infection. Follow-up care is a key part of your treatment and safety. Be sure to make and go to all appointments, and call your doctor if you are having problems. It's also a good idea to know your test results and keep a list of the medicines you take. How can you care for yourself at home? Pay attention to your baby's movements  · You should feel your baby move several times every day. · Your baby now turns less, and kicks and jabs more. · Your baby sleeps 20 to 45 minutes at a time and is more active at certain times of day. · If your doctor wants you to count your baby's kicks:  ¨ Empty your bladder, and lie on your side or relax in a comfortable chair. ¨ Write down your start time. ¨ Pay attention only to your baby's movements. Count any movement except hiccups.   ¨ After you have counted 10 movements, write down your stop time. ¨ Write down how many minutes it took for your baby to move 10 times. ¨ If an hour goes by and you have not recorded 10 movements, have something to eat or drink and then count for another hour. If you do not record 10 movements in either hour, call your doctor. Ease heartburn  · Eat small, frequent meals. · Do not eat chocolate, peppermint, or very spicy foods. Avoid drinks with caffeine, such as coffee, tea, and sodas. · Avoid bending over or lying down after meals. · Talk a short walk after you eat. · If heartburn is a problem at night, do not eat for 2 hours before bedtime. · Take antacids like Mylanta, Maalox, Rolaids, or Tums. Do not take antacids that have sodium bicarbonate. Care for varicose veins  · Varicose veins are blood vessels that stretch out with the extra blood during pregnancy. Your legs may ache or throb. Most varicose veins will go away after the birth. · Avoid standing for long periods of time. Sit with your legs crossed at the ankles, not the knees. · Sit with your feet propped up. · Avoid tight clothing or stockings. Wear support hose. · Exercise regularly. Try walking for at least 30 minutes a day. Where can you learn more? Go to http://henrique-cesar.info/. Enter E840 in the search box to learn more about \"Weeks 30 to 32 of Your Pregnancy: Care Instructions. \"  Current as of: March 16, 2017  Content Version: 11.4  © 9598-7320 [employer]. Care instructions adapted under license by [employer] (which disclaims liability or warranty for this information). If you have questions about a medical condition or this instruction, always ask your healthcare professional. [address] any warranty or liability for your use of this information. Look for L&D tour on line. Jimmy. [address] and delivery tour.

## 2018-01-30 NOTE — PROGRESS NOTES
S: Feeling well. No significant complaints or concerns. Consistent, daily fetal mvmt. No ctxs, LOF, or vaginal bldng. Entire family is recovering from the flu. O: See prenatal flowsheet for maternal and fetal exam  Blood pressure 120/80, pulse (!) 104, height 5' 2\" (1.575 m), weight 175 lb (79.4 kg), last menstrual period 07/10/2017. A:G3   29w0d   Size=Dates    P: Reviewed 3rd trimester packet  Discussed USPFT recommendation for Tdap during every pregnancy, optimal timing of administration between 27 to 36 weeks gestation-although can be done at any time during pregnancy, and that people that will be in close contact with her infant during the first year should also receive a Tdap vaccine if they have not previously received the Tdap vaccine. Pt states understanding. Pt declines Tdap vaccine today, due to recent flu. But would like it at next visit. Reviewed common pregnancy changes and discomfort as well as comfort measures.   See prenatal checklist for other topics covered during visit today  RTO in 2 weeks for CHRISTEN with OLIVER

## 2018-02-15 ENCOUNTER — ROUTINE PRENATAL (OUTPATIENT)
Dept: MIDWIFE SERVICES | Age: 28
End: 2018-02-15

## 2018-02-15 VITALS
HEART RATE: 108 BPM | SYSTOLIC BLOOD PRESSURE: 125 MMHG | HEIGHT: 62 IN | DIASTOLIC BLOOD PRESSURE: 72 MMHG | BODY MASS INDEX: 32.76 KG/M2 | WEIGHT: 178 LBS

## 2018-02-15 DIAGNOSIS — Z34.83 ENCOUNTER FOR SUPERVISION OF OTHER NORMAL PREGNANCY IN THIRD TRIMESTER: Primary | ICD-10-CM

## 2018-02-15 DIAGNOSIS — Z23 ENCOUNTER FOR IMMUNIZATION: ICD-10-CM

## 2018-02-15 NOTE — PROGRESS NOTES
Chief Complaint   Patient presents with    Routine Prenatal Visit     31w3d     Visit Vitals    /72    Pulse (!) 108    Ht 5' 2\" (1.575 m)    Wt 178 lb (80.7 kg)    BMI 32.56 kg/m2     1. Have you been to the ER, urgent care clinic since your last visit? Hospitalized since your last visit? No    2. Have you seen or consulted any other health care providers outside of the [facility] since your last visit? Include any pap smears or colon screening. No     Here today for a routine prenatal visit and she has no complaints or concerns. After obtaining consent, and per orders of sarabjit maynard cnm, injection of tdap given in right arm by Krzysztof Garcia RN. Patient instructed to remain in clinic for 20 minutes afterwards, and to report any adverse reaction to me immediately. Lot: [id number] Exp: 12/02/19 NDC: 59416-793-67 , VIS given.

## 2018-02-15 NOTE — MR AVS SNAPSHOT
2485 [facility] 644. [facility] 510 1900 [facility] 
[phone] Patient: Umm Ramachandran MRN: [id number] EDW:0/71/0800 Visit Information Date & Time Provider Department Dept. Phone Encounter #  
 2/15/2018  3:00 PM Esme De Santiago CNM [facility] [address] [id number] Upcoming Health Maintenance Date Due  
 PAP AKA CERVICAL CYTOLOGY 9/23/2009 Influenza Age 5 to Adult 8/1/2017 OB 3RD TRIMESTER TDAP 1/15/2018 Allergies as of 2/15/2018  Review Complete On: 2/15/2018 By: Esme De Santiago CNM Severity Noted Reaction Type Reactions Bee Sting [Sting, Bee] High 09/27/2017    Swelling Current Immunizations  Never Reviewed Name Date Tdap 2/15/2018 Not reviewed this visit You Were Diagnosed With   
  
 Codes Comments Encounter for immunization    -  Primary ICD-10-CM: I53 ICD-9-CM: V03.89 Vitals BP Pulse Height(growth percentile) Weight(growth percentile) LMP BMI  
 125/72 (!) 108 5' 2\" (1.575 m) 178 lb (80.7 kg) 07/10/2017 32.56 kg/m2 OB Status Smoking Status Pregnant Former Smoker BMI and BSA Data Body Mass Index Body Surface Area 32.56 kg/m 2 1.88 m 2 Your Updated Medication List  
  
   
This list is accurate as of: 2/15/18  3:39 PM.  Always use your most recent med list.  
  
  
  
  
 PRENATAL PO Take  by mouth. We Performed the Following [place] IMMUNIZ ADMIN,1 SINGLE/COMB VAC/TOXOID [id number] CPT(R)] TETANUS, DIPHTHERIA TOXOIDS AND ACELLULAR PERTUSSIS VACCINE (TDAP), IN INDIVIDS. >=7, IM [id number] CPT(R)] Patient Instructions Thank you for choosing [facility]. We know you have many options when it comes to your healthcare; we appreciate that you picked us.  Our goal is to provide exceptional service and world class care for every patient. You may receive a survey in the mail or by email asking for your feedback. Please take a few minutes to share your thoughts about your recent visit. Your comments helps us understand what we do well and what we can do better. To ensure confidentiality, this survey is administered by an independent third-party, [address] [place] [address] participation will help us to continue and improve the quality of care that we provide to you, your family, friends, and neighbors. Thank you! Tdap (Tetanus, Diphtheria, Pertussis) Vaccine: What You Need to Know Why get vaccinated? Tetanus, diphtheria, and pertussis are very serious diseases. Tdap vaccine can protect us from these diseases. And Tdap vaccine given to pregnant women can protect  babies against pertussis. Tetanus (lockjaw) is rare in the [facility] today. It causes painful muscle tightening and stiffness, usually all over the body. · It can lead to tightening of muscles in the head and neck so you can't open your mouth, swallow, or sometimes even breathe. Tetanus kills about 1 out of 10 people who are infected even after receiving the best medical care. Diphtheria is also rare in the [place] today. It can cause a thick coating to form in the back of the throat. · It can lead to breathing problems, heart failure, paralysis, and death. Pertussis (whooping cough) causes severe coughing spells, which can cause difficulty breathing, vomiting, and disturbed sleep. · It can also lead to weight loss, incontinence, and rib fractures. Up to 2 in 100 adolescents and 5 in 100 adults with pertussis are hospitalized or have complications, which could include pneumonia or death. These diseases are caused by bacteria. Diphtheria and pertussis are spread from person to person through secretions from coughing or sneezing. Tetanus enters the body through cuts, scratches, or wounds. Before vaccines, as many as 200,000 cases of diphtheria, 200,000 cases of pertussis, and hundreds of cases of tetanus were reported in the [place] each year. Since vaccination began, reports of cases for tetanus and diphtheria have dropped by about 99% and for pertussis by about 80%. Tdap vaccine The Tdap vaccine can protect adolescents and adults from tetanus, diphtheria, and pertussis. One dose of Tdap is routinely given at age 6 or 15. People who did not get Tdap at that age should get it as soon as possible. Tdap is especially important for health care professionals and anyone having close contact with a baby younger than 12 months. Pregnant women should get a dose of Tdap during every pregnancy, to protect the  from pertussis. Infants are most at risk for severe, life-threatening complications from pertussis. Another vaccine, called Td, protects against tetanus and diphtheria, but not pertussis. A Td booster should be given every 10 years. Tdap may be given as one of these boosters if you have never gotten Tdap before. Tdap may also be given after a severe cut or burn to prevent tetanus infection. Your doctor or the person giving you the vaccine can give you more information. Tdap may safely be given at the same time as other vaccines. Some people should not get this vaccine · A person who has ever had a life-threatening allergic reaction after a previous dose of any diphtheria-, tetanus-, or pertussis-containing vaccine, OR has a severe allergy to any part of this vaccine, should not get Tdap vaccine. Tell the person giving the vaccine about any severe allergies. · Anyone who had coma or long repeated seizures within 7 days after a childhood dose of DTP or DTaP, or a previous dose of Tdap, should not get Tdap, unless a cause other than the vaccine was found. They can still get Td. · Talk to your doctor if you: 
¨ Have seizures or another nervous system problem. ¨ Had severe pain or swelling after any vaccine containing diphtheria, tetanus, or pertussis. ¨ Ever had a condition called Guillain-Barré Syndrome (GBS). ¨ Aren't feeling well on the day the shot is scheduled. Risks With any medicine, including vaccines, there is a chance of side effects. These are usually mild and go away on their own. Serious reactions are also possible but are rare. Most people who get Tdap vaccine do not have any problems with it. Mild problems following Tdap 
(Did not interfere with activities) · Pain where the shot was given (about 3 in 4 adolescents or 2 in 3 adults) · Redness or swelling where the shot was given (about 1 person in 5) · Mild fever of at least 100.4°F (up to about 1 in 25 adolescents or 1 in 100 adults) · Headache (about 3 or 4 people in 10) · Tiredness (about 1 person in 3 or 4) · Nausea, vomiting, diarrhea, stomachache (up to 1 in 4 adolescents or 1 in 10 adults) · Chills, sore joints (about 1 person in 10) · Body aches (about 1 person in 3 or 4) · Rash, swollen glands (uncommon) Moderate problems following Tdap (Interfered with activities, but did not require medical attention) · Pain where the shot was given (up to 1 in 5 or 6) · Redness or swelling where the shot was given (up to about 1 in 16 adolescents or 1 in 12 adults) · Fever over 102°F (about 1 in 100 adolescents or 1 in 250 adults) · Headache (about 1 in 7 adolescents or 1 in 10 adults) · Nausea, vomiting, diarrhea, stomachache (up to 1 to 3 people in 100) · Swelling of the entire arm where the shot was given (up to about 1 in 500) Severe problems following Tdap 
(Unable to perform usual activities; required medical attention) · Swelling, severe pain, bleeding and redness in the arm where the shot was given (rare) Problems that could happen after any vaccine: · People sometimes faint after a medical procedure, including vaccination. Sitting or lying down for about 15 minutes can help prevent fainting, and injuries caused by a fall. Tell your doctor if you feel dizzy or have vision changes or ringing in the ears. · Some people get severe pain in the shoulder and have difficulty moving the arm where a shot was given. This happens very rarely. · Any medication can cause a severe allergic reaction. Such reactions from a vaccine are very rare, estimated at fewer than 1 in a million doses, and would happen within a few minutes to a few hours after the vaccination. As with any medicine, there is a very remote chance of a vaccine causing a serious injury or death. The safety of vaccines is always being monitored. For more information, visit: www.cdc.gov/vaccinesafety. What if there is a serious problem? What should I look for? · Look for anything that concerns you, such as signs of a severe allergic reaction, very high fever, or unusual behavior. Signs of a severe allergic reaction can include hives, swelling of the face and throat, difficulty breathing, a fast heartbeat, dizziness, and weakness. These would usually start a few minutes to a few hours after the vaccination. What should I do? · If you think it is a severe allergic reaction or other emergency that can't wait, call 9-1-1 or get the person to the nearest hospital. Otherwise, call your doctor. · Afterward, the reaction should be reported to the Vaccine Adverse Event Reporting System (VAERS). Your doctor might file this report, or you can do it yourself through the VAERS web site at www.vaers. hhs.gov, or by calling 2-717.156.7916. VAERS does not give medical advice. The National Vaccine Injury Compensation Program 
The National Vaccine Injury Compensation Program (VICP) is a federal program that was created to compensate people who may have been injured by certain vaccines.  
Persons who believe they may have been injured by a vaccine can learn about the program and about filing a claim by calling 4-622.346.9921 or visiting the [address] website at www.[employer].gov/vaccinecompensation. There is a time limit to file a claim for compensation. How can I learn more? · Ask your doctor. He or she can give you the vaccine package insert or suggest other sources of information. · Call your local or state health department. · Contact the Centers for Disease Control and Prevention (CDC): 
¨ Call 7-216.412.3589 (6-358-VOV-INFO) or ¨ Visit CDC's website at www.cdc.gov/vaccines Vaccine Information Statement (Interim) Tdap Vaccine 
(2/24/15) [address] [id number] [facility] and [employer] Centers for Disease Control and Prevention Many Vaccine Information Statements are available in [place] and other languages. See www.immunize.org/vis. Muchas hojas de información sobre vacunas están disponibles en español y en otros idiomas. Visite www.immunize.org/vis. Care instructions adapted under license by [employer] (which disclaims liability or warranty for this information). If you have questions about a medical condition or this instruction, always ask your healthcare professional. [place] [address] any warranty or liability for your use of this information. Learning About When to Call Your Doctor During Pregnancy (After 20 Weeks) Your Care Instructions It's common to have concerns about what might be a problem during pregnancy. Although most pregnant women don't have any serious problems, it's important to know when to call your doctor if you have certain symptoms or signs of labor. These are general suggestions. Your doctor may give you some more information about when to call. When to call your doctor (after 20 weeks) Call 911 anytime you think you may need emergency care. For example, call if: 
· You have severe vaginal bleeding. · You have sudden, severe pain in your belly. · You passed out (lost consciousness). · You have a seizure. · You see or feel the umbilical cord. · You think you are about to deliver your baby and can't make it safely to the hospital. 
Call your doctor now or seek immediate medical care if: 
· You have vaginal bleeding. · You have belly pain. · You have a fever. · You have symptoms of preeclampsia, such as: 
¨ Sudden swelling of your face, hands, or feet. ¨ New vision problems (such as dimness or blurring). ¨ A severe headache. · You have a sudden release of fluid from your vagina. (You think your water broke.) · You think that you may be in labor. This means that you've had at least 4 contractions within 20 minutes or at least 8 contractions in an hour. · You notice that your baby has stopped moving or is moving much less than normal. 
· You have symptoms of a urinary tract infection. These may include: 
¨ Pain or burning when you urinate. ¨ A frequent need to urinate without being able to pass much urine. ¨ Pain in the flank, which is just below the rib cage and above the waist on either side of the back. ¨ Blood in your urine. Watch closely for changes in your health, and be sure to contact your doctor if: 
· You have vaginal discharge that smells bad. · You have skin changes, such as: ¨ A rash. ¨ Itching. ¨ Yellow color to your skin. · You have other concerns about your pregnancy. If you have labor signs at 37 weeks or more If you have signs of labor at 37 weeks or more, your doctor may tell you to call when your labor becomes more active. Symptoms of active labor include: 
· Contractions that are regular. · Contractions that are less than 5 minutes apart. · Contractions that are hard to talk through. Follow-up care is a key part of your treatment and safety. Be sure to make and go to all appointments, and call your doctor if you are having problems. It's also a good idea to know your test results and keep a list of the medicines you take. Where can you learn more? Go to http://henrique-cesar.info/. Enter  in the search box to learn more about \"Learning About When to Call Your Doctor During Pregnancy (After 20 Weeks). \" 
Current as of: 2017 Content Version: 11.4 © 5802-3608 [employer]ify. Care instructions adapted under license by [employer] (which disclaims liability or warranty for this information). If you have questions about a medical condition or this instruction, always ask your healthcare professional. [place] [address] any warranty or liability for your use of this information. Weeks 32 to 34 of Your Pregnancy: Care Instructions Your Care Instructions During the last few weeks of your pregnancy, you may have more aches and pains. It's important to rest when you can. Your growing baby is putting more pressure on your bladder. So you may need to urinate more often. Hemorrhoids are also common. These are painful, itchy veins in the rectal area. In the 36th week, most women have a test for group B streptococcus (GBS). GBS is a common bacteria that can live in the vagina and rectum. It can make your baby sick after birth. If you test positive, you will get antibiotics during labor. These will keep your baby from getting the bacteria. You may want to talk with your doctor about banking your baby's umbilical cord blood. This is the blood left in the cord after birth. If you want to save this blood, you must arrange it ahead of time. You can't decide at the last minute. If you haven't already had the Tdap shot during this pregnancy, talk to your doctor about getting it. It will help protect your  against pertussis infection. Follow-up care is a key part of your treatment and safety. Be sure to make and go to all appointments, and call your doctor if you are having problems. It's also a good idea to know your test results and keep a list of the medicines you take. How can you care for yourself at home? Ease hemorrhoids · Get more liquids, fruits, vegetables, and fiber in your diet. This will help keep your stools soft. · Avoid sitting for too long. Lie on your left side several times a day. · Clean yourself with soft, moist toilet paper. Or you can use witch hazel pads or personal hygiene pads. · If you are uncomfortable, try ice packs. Or you can sit in a warm sitz bath. Do these for 20 minutes at a time, as needed. · Use hydrocortisone cream for pain and itching. Two examples are Anusol and Preparation H Hydrocortisone. · Ask your doctor about taking an over-the-counter stool softener. Consider breastfeeding · Experts recommend that women breastfeed for 1 year or longer. Breast milk is the perfect food for babies. · Breast milk is easier for babies to digest than formula. And it is always available, just the right temperature, and free. · In general, babies who are  are healthier than formula-fed babies. ¨  babies are less likely to get ear infections, colds, diarrhea, and pneumonia. ¨  babies who are fed only breast milk are less likely to get asthma and allergies. ¨  babies are less likely to be obese. ¨  babies are less likely to get diabetes or heart disease. · Women who breastfeed have less bleeding after the birth. Their uteruses also shrink back faster. · Some women who breastfeed lose weight faster. Making milk burns calories. · Breastfeeding can lower your risk of breast cancer, ovarian cancer, and osteoporosis. Decide about circumcision for boys · As you make this decision, it may help to think about your personal, Holiness, and family traditions. You get to decide if you will keep your son's penis natural or if he will be circumcised. · If you decide that you would like to have your baby circumcised, talk with your doctor. You can share your concerns about pain.  And you can discuss your preferences for anesthesia. Where can you learn more? Go to http://henrique-cesar.info/. Enter U985 in the search box to learn more about \"Weeks 32 to 34 of Your Pregnancy: Care Instructions. \" Current as of: 2017 Content Version: 11.4 © 6763-2364 [employer]. Care instructions adapted under license by [employer] (which disclaims liability or warranty for this information). If you have questions about a medical condition or this instruction, always ask your healthcare professional. [address] any warranty or liability for your use of this information. Weeks 30 to 32 of Your Pregnancy: Care Instructions Your Care Instructions You have made it to the final months of your pregnancy. By now, your baby is really starting to look like a baby, with hair and plump skin. As you enter the final weeks of pregnancy, the reality of having a baby may start to set in. This is the time to settle on a name, get your household in order, set up a safe nursery, and find quality  if needed. Doing these things in advance will allow you to focus on caring for and enjoying your new baby. You may also want to have a tour of your hospital's labor and delivery unit to get a better idea of what to expect while you are in the hospital. 
During these last months, it is very important to take good care of yourself and pay attention to what your body needs. If your doctor says it is okay for you to work, don't push yourself too hard. Use the tips provided in this care sheet to ease heartburn and care for varicose veins. If you haven't already had the Tdap shot during this pregnancy, talk to your doctor about getting it. It will help protect your  against pertussis infection. Follow-up care is a key part of your treatment and safety.  Be sure to make and go to all appointments, and call your doctor if you are having problems. It's also a good idea to know your test results and keep a list of the medicines you take. How can you care for yourself at home? Pay attention to your baby's movements · You should feel your baby move several times every day. · Your baby now turns less, and kicks and jabs more. · Your baby sleeps 20 to 45 minutes at a time and is more active at certain times of day. · If your doctor wants you to count your baby's kicks: 
¨ Empty your bladder, and lie on your side or relax in a comfortable chair. ¨ Write down your start time. ¨ Pay attention only to your baby's movements. Count any movement except hiccups. ¨ After you have counted 10 movements, write down your stop time. ¨ Write down how many minutes it took for your baby to move 10 times. ¨ If an hour goes by and you have not recorded 10 movements, have something to eat or drink and then count for another hour. If you do not record 10 movements in either hour, call your doctor. Ease heartburn · Eat small, frequent meals. · Do not eat chocolate, peppermint, or very spicy foods. Avoid drinks with caffeine, such as coffee, tea, and sodas. · Avoid bending over or lying down after meals. · Talk a short walk after you eat. · If heartburn is a problem at night, do not eat for 2 hours before bedtime. · Take antacids like Mylanta, Maalox, Rolaids, or Tums. Do not take antacids that have sodium bicarbonate. Care for varicose veins · Varicose veins are blood vessels that stretch out with the extra blood during pregnancy. Your legs may ache or throb. Most varicose veins will go away after the birth. · Avoid standing for long periods of time. Sit with your legs crossed at the ankles, not the knees. · Sit with your feet propped up. · Avoid tight clothing or stockings. Wear support hose. · Exercise regularly. Try walking for at least 30 minutes a day. Where can you learn more? Go to http://henrique-cesar.info/. Enter G698 in the search box to learn more about \"Weeks 30 to 32 of Your Pregnancy: Care Instructions. \" Current as of: March 16, 2017 Content Version: 11.4 © 0642-4259 Healthwise[employer] Incorporated. Care instructions adapted under license by [employer]sense (which disclaims liability or warranty for this information). If you have questions about a medical condition or this instruction, always ask your healthcare professional. [place] [address] any warranty or liability for your use of this information. Introducing [facility]! [facility] introduces M[employer] technologies patient portal. Now you can access parts of your medical record, email your doctor's office, and request medication refills online. 1. In your internet browser, go to [email or web address] 2. Click on the First Time User? Click Here link in the Sign In box. You will see the New Member Sign Up page. 3. Enter your [employer] technologies Access Code exactly as it appears below. You will not need to use this code after youve completed the sign-up process. If you do not sign up before the expiration date, you must request a new code. · [employer] technologies Access Code: [email or web address] Expires: 4/15/2018  9:57 AM 
 
4. Enter the last four digits of your Social Security Number (xxxx) and Date of Birth (mm/dd/yyyy) as indicated and click Submit. You will be taken to the next sign-up page. 5. Create a [employer] technologies ID. This will be your [employer] login ID and cannot be changed, so think of one that is secure and easy to remember. 6. Create a [employer] password. You can change your password at any time. 7. Enter your Password Reset Question and Answer. This can be used at a later time if you forget your password. 8. Enter your e-mail address. You will receive e-mail notification when new information is available in [address] 9. Click Sign Up. You can now view and download portions of your medical record. 10. Click the Download Summary menu link to download a portable copy of your medical information. If you have questions, please visit the Frequently Asked Questions section of the Patient Feed website. Remember, Patient Feed is NOT to be used for urgent needs. For medical emergencies, dial 911. Now available from your iPhone and Android! Please provide this summary of care documentation to your next provider. Your primary care clinician is listed as NONE. If you have any questions after today's visit, please call [phone].

## 2018-02-15 NOTE — PATIENT INSTRUCTIONS
Thank you for choosing [facility]. We know you have many options when it comes to your healthcare; we appreciate that you picked us. Our goal is to provide exceptional service and world class care for every patient. You may receive a survey in the mail or by email asking for your feedback. Please take a few minutes to share your thoughts about your recent visit. Your comments helps us understand what we do well and what we can do better. To ensure confidentiality, this survey is administered by an independent third-party, [address] [place] [address] participation will help us to continue and improve the quality of care that we provide to you, your family, friends, and neighbors. Thank you! Tdap (Tetanus, Diphtheria, Pertussis) Vaccine: What You Need to Know  Why get vaccinated? Tetanus, diphtheria, and pertussis are very serious diseases. Tdap vaccine can protect us from these diseases. And Tdap vaccine given to pregnant women can protect  babies against pertussis. Tetanus (lockjaw) is rare in the [facility] today. It causes painful muscle tightening and stiffness, usually all over the body. · It can lead to tightening of muscles in the head and neck so you can't open your mouth, swallow, or sometimes even breathe. Tetanus kills about 1 out of 10 people who are infected even after receiving the best medical care. Diphtheria is also rare in the [place] today. It can cause a thick coating to form in the back of the throat. · It can lead to breathing problems, heart failure, paralysis, and death. Pertussis (whooping cough) causes severe coughing spells, which can cause difficulty breathing, vomiting, and disturbed sleep. · It can also lead to weight loss, incontinence, and rib fractures. Up to 2 in 100 adolescents and 5 in 100 adults with pertussis are hospitalized or have complications, which could include pneumonia or death.   These diseases are caused by bacteria. Diphtheria and pertussis are spread from person to person through secretions from coughing or sneezing. Tetanus enters the body through cuts, scratches, or wounds. Before vaccines, as many as 200,000 cases of diphtheria, 200,000 cases of pertussis, and hundreds of cases of tetanus were reported in the [place] each year. Since vaccination began, reports of cases for tetanus and diphtheria have dropped by about 99% and for pertussis by about 80%. Tdap vaccine  The Tdap vaccine can protect adolescents and adults from tetanus, diphtheria, and pertussis. One dose of Tdap is routinely given at age 6 or 15. People who did not get Tdap at that age should get it as soon as possible. Tdap is especially important for health care professionals and anyone having close contact with a baby younger than 12 months. Pregnant women should get a dose of Tdap during every pregnancy, to protect the  from pertussis. Infants are most at risk for severe, life-threatening complications from pertussis. Another vaccine, called Td, protects against tetanus and diphtheria, but not pertussis. A Td booster should be given every 10 years. Tdap may be given as one of these boosters if you have never gotten Tdap before. Tdap may also be given after a severe cut or burn to prevent tetanus infection. Your doctor or the person giving you the vaccine can give you more information. Tdap may safely be given at the same time as other vaccines. Some people should not get this vaccine  · A person who has ever had a life-threatening allergic reaction after a previous dose of any diphtheria-, tetanus-, or pertussis-containing vaccine, OR has a severe allergy to any part of this vaccine, should not get Tdap vaccine. Tell the person giving the vaccine about any severe allergies.   · Anyone who had coma or long repeated seizures within 7 days after a childhood dose of DTP or DTaP, or a previous dose of Tdap, should not get Tdap, unless a cause other than the vaccine was found. They can still get Td. · Talk to your doctor if you:  ¨ Have seizures or another nervous system problem. ¨ Had severe pain or swelling after any vaccine containing diphtheria, tetanus, or pertussis. ¨ Ever had a condition called Guillain-Barré Syndrome (GBS). ¨ Aren't feeling well on the day the shot is scheduled. Risks  With any medicine, including vaccines, there is a chance of side effects. These are usually mild and go away on their own. Serious reactions are also possible but are rare. Most people who get Tdap vaccine do not have any problems with it.   Mild problems following Tdap  (Did not interfere with activities)  · Pain where the shot was given (about 3 in 4 adolescents or 2 in 3 adults)  · Redness or swelling where the shot was given (about 1 person in 5)  · Mild fever of at least 100.4°F (up to about 1 in 25 adolescents or 1 in 100 adults)  · Headache (about 3 or 4 people in 10)  · Tiredness (about 1 person in 3 or 4)  · Nausea, vomiting, diarrhea, stomachache (up to 1 in 4 adolescents or 1 in 10 adults)  · Chills, sore joints (about 1 person in 10)  · Body aches (about 1 person in 3 or 4)  · Rash, swollen glands (uncommon)  Moderate problems following Tdap  (Interfered with activities, but did not require medical attention)  · Pain where the shot was given (up to 1 in 5 or 6)  · Redness or swelling where the shot was given (up to about 1 in 16 adolescents or 1 in 12 adults)  · Fever over 102°F (about 1 in 100 adolescents or 1 in 250 adults)  · Headache (about 1 in 7 adolescents or 1 in 10 adults)  · Nausea, vomiting, diarrhea, stomachache (up to 1 to 3 people in 100)  · Swelling of the entire arm where the shot was given (up to about 1 in 500)  Severe problems following Tdap  (Unable to perform usual activities; required medical attention)  · Swelling, severe pain, bleeding and redness in the arm where the shot was given (rare)  Problems that could happen after any vaccine:  · People sometimes faint after a medical procedure, including vaccination. Sitting or lying down for about 15 minutes can help prevent fainting, and injuries caused by a fall. Tell your doctor if you feel dizzy or have vision changes or ringing in the ears. · Some people get severe pain in the shoulder and have difficulty moving the arm where a shot was given. This happens very rarely. · Any medication can cause a severe allergic reaction. Such reactions from a vaccine are very rare, estimated at fewer than 1 in a million doses, and would happen within a few minutes to a few hours after the vaccination. As with any medicine, there is a very remote chance of a vaccine causing a serious injury or death. The safety of vaccines is always being monitored. For more information, visit: www.cdc.gov/vaccinesafety. What if there is a serious problem? What should I look for? · Look for anything that concerns you, such as signs of a severe allergic reaction, very high fever, or unusual behavior. Signs of a severe allergic reaction can include hives, swelling of the face and throat, difficulty breathing, a fast heartbeat, dizziness, and weakness. These would usually start a few minutes to a few hours after the vaccination. What should I do? · If you think it is a severe allergic reaction or other emergency that can't wait, call 9-1-1 or get the person to the nearest hospital. Otherwise, call your doctor. · Afterward, the reaction should be reported to the Vaccine Adverse Event Reporting System (VAERS). Your doctor might file this report, or you can do it yourself through the VAERS web site at www.vaers. hhs.gov, or by calling 9-331.796.2788. VAERS does not give medical advice.   The [facility] Vaccine Injury Compensation Program  The National Vaccine Injury Compensation Program (VICP) is a federal program that was created to compensate people who may have been injured by certain vaccines. Persons who believe they may have been injured by a vaccine can learn about the program and about filing a claim by calling 6-609.319.5144 or visiting the [address] [employer] website at www.[facility].gov/vaccinecompensation. There is a time limit to file a claim for compensation. How can I learn more? · Ask your doctor. He or she can give you the vaccine package insert or suggest other sources of information. · Call your local or state health department. · Contact the Centers for Disease Control and Prevention (CDC):  ¨ Call 3-478.414.1453 (1-800-CDC-INFO) or  ¨ Visit CDC's website at www.cdc.gov/vaccines  Vaccine Information Statement (Interim)  Tdap Vaccine  (2/24/15)  [address]27  Department of Health and Human Services  Centers for Disease Control and Prevention  Many Vaccine Information Statements are available in Chinese and other languages. See www.immunize.org/vis. Muchas hojas de información sobre vacunas están disponibles en español y en otros idiomas. Visite www.immunize.org/vis. Care instructions adapted under license by [employer] (which disclaims liability or warranty for this information). If you have questions about a medical condition or this instruction, always ask your healthcare professional. [place] [address] any warranty or liability for your use of this information. Learning About When to Call Your Doctor During Pregnancy (After 20 Weeks)  Your Care Instructions  It's common to have concerns about what might be a problem during pregnancy. Although most pregnant women don't have any serious problems, it's important to know when to call your doctor if you have certain symptoms or signs of labor. These are general suggestions. Your doctor may give you some more information about when to call. When to call your doctor (after 20 weeks)  Call 911 anytime you think you may need emergency care. For example, call if:  · You have severe vaginal bleeding.   · You have sudden, severe pain in your belly. · You passed out (lost consciousness). · You have a seizure. · You see or feel the umbilical cord. · You think you are about to deliver your baby and can't make it safely to the hospital.  Call your doctor now or seek immediate medical care if:  · You have vaginal bleeding. · You have belly pain. · You have a fever. · You have symptoms of preeclampsia, such as:  ¨ Sudden swelling of your face, hands, or feet. ¨ New vision problems (such as dimness or blurring). ¨ A severe headache. · You have a sudden release of fluid from your vagina. (You think your water broke.)  · You think that you may be in labor. This means that you've had at least 4 contractions within 20 minutes or at least 8 contractions in an hour. · You notice that your baby has stopped moving or is moving much less than normal.  · You have symptoms of a urinary tract infection. These may include:  ¨ Pain or burning when you urinate. ¨ A frequent need to urinate without being able to pass much urine. ¨ Pain in the flank, which is just below the rib cage and above the waist on either side of the back. ¨ Blood in your urine. Watch closely for changes in your health, and be sure to contact your doctor if:  · You have vaginal discharge that smells bad. · You have skin changes, such as:  ¨ A rash. ¨ Itching. ¨ Yellow color to your skin. · You have other concerns about your pregnancy. If you have labor signs at 37 weeks or more  If you have signs of labor at 37 weeks or more, your doctor may tell you to call when your labor becomes more active. Symptoms of active labor include:  · Contractions that are regular. · Contractions that are less than 5 minutes apart. · Contractions that are hard to talk through. Follow-up care is a key part of your treatment and safety. Be sure to make and go to all appointments, and call your doctor if you are having problems.  It's also a good idea to know your test results and keep a list of the medicines you take. Where can you learn more? Go to http://henrique-cesar.info/. Enter  in the search box to learn more about \"Learning About When to Call Your Doctor During Pregnancy (After 20 Weeks). \"  Current as of: 2017  Content Version: 11.4  © 1051-7825 [employer]. Care instructions adapted under license by [employer] (which disclaims liability or warranty for this information). If you have questions about a medical condition or this instruction, always ask your healthcare professional. [place] [address] any warranty or liability for your use of this information. Weeks 32 to 34 of Your Pregnancy: Care Instructions  Your Care Instructions    During the last few weeks of your pregnancy, you may have more aches and pains. It's important to rest when you can. Your growing baby is putting more pressure on your bladder. So you may need to urinate more often. Hemorrhoids are also common. These are painful, itchy veins in the rectal area. In the 36th week, most women have a test for group B streptococcus (GBS). GBS is a common bacteria that can live in the vagina and rectum. It can make your baby sick after birth. If you test positive, you will get antibiotics during labor. These will keep your baby from getting the bacteria. You may want to talk with your doctor about banking your baby's umbilical cord blood. This is the blood left in the cord after birth. If you want to save this blood, you must arrange it ahead of time. You can't decide at the last minute. If you haven't already had the Tdap shot during this pregnancy, talk to your doctor about getting it. It will help protect your  against pertussis infection. Follow-up care is a key part of your treatment and safety. Be sure to make and go to all appointments, and call your doctor if you are having problems.  It's also a good idea to know your test results and keep a list of the medicines you take. How can you care for yourself at home? Ease hemorrhoids  · Get more liquids, fruits, vegetables, and fiber in your diet. This will help keep your stools soft. · Avoid sitting for too long. Lie on your left side several times a day. · Clean yourself with soft, moist toilet paper. Or you can use witch hazel pads or personal hygiene pads. · If you are uncomfortable, try ice packs. Or you can sit in a warm sitz bath. Do these for 20 minutes at a time, as needed. · Use hydrocortisone cream for pain and itching. Two examples are Anusol and Preparation H Hydrocortisone. · Ask your doctor about taking an over-the-counter stool softener. Consider breastfeeding  · Experts recommend that women breastfeed for 1 year or longer. Breast milk is the perfect food for babies. · Breast milk is easier for babies to digest than formula. And it is always available, just the right temperature, and free. · In general, babies who are  are healthier than formula-fed babies. ¨  babies are less likely to get ear infections, colds, diarrhea, and pneumonia. ¨  babies who are fed only breast milk are less likely to get asthma and allergies. ¨  babies are less likely to be obese. ¨  babies are less likely to get diabetes or heart disease. · Women who breastfeed have less bleeding after the birth. Their uteruses also shrink back faster. · Some women who breastfeed lose weight faster. Making milk burns calories. · Breastfeeding can lower your risk of breast cancer, ovarian cancer, and osteoporosis. Decide about circumcision for boys  · As you make this decision, it may help to think about your personal, Tenriism, and family traditions. You get to decide if you will keep your son's penis natural or if he will be circumcised. · If you decide that you would like to have your baby circumcised, talk with your doctor.  You can share your concerns about pain. And you can discuss your preferences for anesthesia. Where can you learn more? Go to http://henrique-cesar.info/. Enter T782 in the search box to learn more about \"Weeks 32 to 34 of Your Pregnancy: Care Instructions. \"  Current as of: 2017  Content Version: 11.4  © 0825-9304 [employer]. Care instructions adapted under license by [employer] (which disclaims liability or warranty for this information). If you have questions about a medical condition or this instruction, always ask your healthcare professional. [place] [address] any warranty or liability for your use of this information. Weeks 30 to 32 of Your Pregnancy: Care Instructions  Your Care Instructions    You have made it to the final months of your pregnancy. By now, your baby is really starting to look like a baby, with hair and plump skin. As you enter the final weeks of pregnancy, the reality of having a baby may start to set in. This is the time to settle on a name, get your household in order, set up a safe nursery, and find quality  if needed. Doing these things in advance will allow you to focus on caring for and enjoying your new baby. You may also want to have a tour of your hospital's labor and delivery unit to get a better idea of what to expect while you are in the hospital.  During these last months, it is very important to take good care of yourself and pay attention to what your body needs. If your doctor says it is okay for you to work, don't push yourself too hard. Use the tips provided in this care sheet to ease heartburn and care for varicose veins. If you haven't already had the Tdap shot during this pregnancy, talk to your doctor about getting it. It will help protect your  against pertussis infection. Follow-up care is a key part of your treatment and safety.  Be sure to make and go to all appointments, and call your doctor if you are having problems. It's also a good idea to know your test results and keep a list of the medicines you take. How can you care for yourself at home? Pay attention to your baby's movements  · You should feel your baby move several times every day. · Your baby now turns less, and kicks and jabs more. · Your baby sleeps 20 to 45 minutes at a time and is more active at certain times of day. · If your doctor wants you to count your baby's kicks:  ¨ Empty your bladder, and lie on your side or relax in a comfortable chair. ¨ Write down your start time. ¨ Pay attention only to your baby's movements. Count any movement except hiccups. ¨ After you have counted 10 movements, write down your stop time. ¨ Write down how many minutes it took for your baby to move 10 times. ¨ If an hour goes by and you have not recorded 10 movements, have something to eat or drink and then count for another hour. If you do not record 10 movements in either hour, call your doctor. Ease heartburn  · Eat small, frequent meals. · Do not eat chocolate, peppermint, or very spicy foods. Avoid drinks with caffeine, such as coffee, tea, and sodas. · Avoid bending over or lying down after meals. · Talk a short walk after you eat. · If heartburn is a problem at night, do not eat for 2 hours before bedtime. · Take antacids like Mylanta, Maalox, Rolaids, or Tums. Do not take antacids that have sodium bicarbonate. Care for varicose veins  · Varicose veins are blood vessels that stretch out with the extra blood during pregnancy. Your legs may ache or throb. Most varicose veins will go away after the birth. · Avoid standing for long periods of time. Sit with your legs crossed at the ankles, not the knees. · Sit with your feet propped up. · Avoid tight clothing or stockings. Wear support hose. · Exercise regularly. Try walking for at least 30 minutes a day. Where can you learn more?   Go to http://henrique-cesar.info/. Enter J172 in the search box to learn more about \"Weeks 30 to 32 of Your Pregnancy: Care Instructions. \"  Current as of: March 16, 2017  Content Version: 11.4  © 3815-4125 [employer]. Care instructions adapted under license by [employer] (which disclaims liability or warranty for this information). If you have questions about a medical condition or this instruction, always ask your healthcare professional. [address] any warranty or liability for your use of this information.

## 2018-02-15 NOTE — PROGRESS NOTES
S: Feeling well. No significant complaints or concerns. Consistent, daily fetal mvmt. No ctxs, LOF, or vaginal bldng. Feeling well. Biggest fear is needing a . No concerns today. O: See prenatal flowsheet for maternal and fetal exam  Blood pressure 125/72, pulse (!) 108, height 5' 2\" (1.575 m), weight 178 lb (80.7 kg), last menstrual period 07/10/2017. A:  31w3d   Size=Dates    P:   Discussed USPFT recommendation for Tdap during every pregnancy, optimal timing of administration between 27 to 36 weeks gestation-although can be done at any time during pregnancy, and that people that will be in close contact with her infant during the first year should also receive a Tdap vaccine if they have not previously received the Tdap vaccine. Pt states understanding. Pt desires Tdap vaccine today. Discussed pediatricians. Discussed c-sections and things that facilitate a need for one. Offered reassurance. Discussed postdates management as both previous pregnancies were induced at 41w3d. Reviewed common pregnancy changes and discomfort as well as comfort measures.   See prenatal checklist for other topics covered during visit today  RTO in 2 weeks for CHRISTEN with OLIVER

## 2018-03-01 ENCOUNTER — ROUTINE PRENATAL (OUTPATIENT)
Dept: MIDWIFE SERVICES | Age: 28
End: 2018-03-01

## 2018-03-01 VITALS
WEIGHT: 177.31 LBS | HEIGHT: 62 IN | DIASTOLIC BLOOD PRESSURE: 75 MMHG | BODY MASS INDEX: 32.63 KG/M2 | SYSTOLIC BLOOD PRESSURE: 121 MMHG | HEART RATE: 117 BPM

## 2018-03-01 DIAGNOSIS — Z34.83 ENCOUNTER FOR SUPERVISION OF NORMAL PREGNANCY IN MULTIGRAVIDA IN THIRD TRIMESTER: Primary | ICD-10-CM

## 2018-03-01 NOTE — PROGRESS NOTES
Chief Complaint   Patient presents with    Routine Prenatal Visit     33w 3d     Visit Vitals    /75    Pulse (!) 117    Ht 5' 2\" (1.575 m)    Wt 177 lb 5 oz (80.4 kg)    LMP 07/10/2017    BMI 32.43 kg/m2     1. Have you been to the ER, urgent care clinic since your last visit? Hospitalized since your last visit? No    2. Have you seen or consulted any other health care providers outside of the [address] [place] [address] since your last visit? Include any pap smears or colon screening.  No

## 2018-03-01 NOTE — PATIENT INSTRUCTIONS
Thank you for choosing [address]. We know you have many options when it comes to your healthcare; we appreciate that you picked us. Our goal is to provide exceptional service and world class care for every patient. You may receive a survey in the mail or by email asking for your feedback. Please take a few minutes to share your thoughts about your recent visit. Your comments helps us understand what we do well and what we can do better. To ensure confidentiality, this survey is administered by an independent third-party, [address] [place] [address] participation will help us to continue and improve the quality of care that we provide to you, your family, friends, and neighbors. Thank you! Weeks 32 to 34 of Your Pregnancy: Care Instructions  Your Care Instructions    During the last few weeks of your pregnancy, you may have more aches and pains. It's important to rest when you can. Your growing baby is putting more pressure on your bladder. So you may need to urinate more often. Hemorrhoids are also common. These are painful, itchy veins in the rectal area. In the 36th week, most women have a test for group B streptococcus (GBS). GBS is a common bacteria that can live in the vagina and rectum. It can make your baby sick after birth. If you test positive, you will get antibiotics during labor. These will keep your baby from getting the bacteria. You may want to talk with your doctor about banking your baby's umbilical cord blood. This is the blood left in the cord after birth. If you want to save this blood, you must arrange it ahead of time. You can't decide at the last minute. If you haven't already had the Tdap shot during this pregnancy, talk to your doctor about getting it. It will help protect your  against pertussis infection. Follow-up care is a key part of your treatment and safety.  Be sure to make and go to all appointments, and call your doctor if you are having problems. It's also a good idea to know your test results and keep a list of the medicines you take. How can you care for yourself at home? Ease hemorrhoids  · Get more liquids, fruits, vegetables, and fiber in your diet. This will help keep your stools soft. · Avoid sitting for too long. Lie on your left side several times a day. · Clean yourself with soft, moist toilet paper. Or you can use witch hazel pads or personal hygiene pads. · If you are uncomfortable, try ice packs. Or you can sit in a warm sitz bath. Do these for 20 minutes at a time, as needed. · Use hydrocortisone cream for pain and itching. Two examples are Anusol and Preparation H Hydrocortisone. · Ask your doctor about taking an over-the-counter stool softener. Consider breastfeeding  · Experts recommend that women breastfeed for 1 year or longer. Breast milk is the perfect food for babies. · Breast milk is easier for babies to digest than formula. And it is always available, just the right temperature, and free. · In general, babies who are  are healthier than formula-fed babies. ¨  babies are less likely to get ear infections, colds, diarrhea, and pneumonia. ¨  babies who are fed only breast milk are less likely to get asthma and allergies. ¨  babies are less likely to be obese. ¨  babies are less likely to get diabetes or heart disease. · Women who breastfeed have less bleeding after the birth. Their uteruses also shrink back faster. · Some women who breastfeed lose weight faster. Making milk burns calories. · Breastfeeding can lower your risk of breast cancer, ovarian cancer, and osteoporosis. Decide about circumcision for boys  · As you make this decision, it may help to think about your personal, Syn[employer], and family traditions. You get to decide if you will keep your son's penis natural or if he will be circumcised.   · If you decide that you would like to have your baby circumcised, talk with your doctor. You can share your concerns about pain. And you can discuss your preferences for anesthesia. Where can you learn more? Go to http://henrique-cesar.info/. Enter Z675 in the search box to learn more about \"Weeks 32 to 34 of Your Pregnancy: Care Instructions. \"  Current as of: March 16, 2017  Content Version: 11.4  © 6866-5018 [employer]. Care instructions adapted under license by "[employer] (which disclaims liability or warranty for this information). If you have questions about a medical condition or this instruction, always ask your healthcare professional. [address] any warranty or liability for your use of this information.

## 2018-03-01 NOTE — MR AVS SNAPSHOT
[address] [facility] [address] [facility] 
[phone] Patient: Aaron Purvis MRN: [id number] Visit Information Date & Time Provider Department Dept. Phone Encounter #  
 3/1/2018  3:30 PM [facility] [address] [id number] Follow-up Instructions Return in about 2 weeks (around 3/15/2018) for with OLIVER. 3/15/2018  1:30 PM  
OB VISIT with [facility] [address] ([facility]) Appt Note: ret ob  
 [address] [facility] [address] [facility]  
[phone]  
  
   
 [address] [facility] Upcoming Health Maintenance Date Due  
 PAP AKA CERVICAL CYTOLOGY 9/23/2009 Influenza Age 5 to Adult 8/1/2017 Allergies as of 3/1/2018  Review Complete On: 3/1/2018 By: Fariha Camarillo CNM Severity Noted Reaction Type Reactions Bee Sting [Sting, Bee] High 09/27/2017    Swelling Current Immunizations  Never Reviewed Name Date Tdap 2/15/2018 Not reviewed this visit You Were Diagnosed With   
  
 Codes Comments Encounter for supervision of normal pregnancy in multigravida in third trimester    -  Primary ICD-10-CM: Z34.83 ICD-9-CM: V22.1 Vitals BP Pulse Height(growth percentile) Weight(growth percentile) LMP BMI  
 121/75 (!) 117 5' 2\" (1.575 m) 177 lb 5 oz (80.4 kg) 07/10/2017 32.43 kg/m2 OB Status Smoking Status Pregnant Former Smoker BMI and BSA Data Body Mass Index Body Surface Area  
 32.43 kg/m 2 1.88 m 2 Preferred Pharmacy Pharmacy Name Phone [facility] [address] [place] [address] [phone] Your Updated Medication List  
  
   
This list is accurate as of 3/1/18  3:51 PM.  Always use your most recent med list.  
  
  
  
  
 PRENATAL PO Take  by mouth. Follow-up Instructions Return in about 2 weeks (around 3/15/2018) for with OLIVER. Patient Instructions Thank you for choosing [address]. We know you have many options when it comes to your healthcare; we appreciate that you picked us. Our goal is to provide exceptional service and world class care for every patient. You may receive a survey in the mail or by email asking for your feedback. Please take a few minutes to share your thoughts about your recent visit. Your comments helps us understand what we do well and what we can do better. To ensure confidentiality, this survey is administered by an independent third-party, [employer] participation will help us to continue and improve the quality of care that we provide to you, your family, friends, and neighbors. Thank you! Weeks 32 to 34 of Your Pregnancy: Care Instructions Your Care Instructions During the last few weeks of your pregnancy, you may have more aches and pains. It's important to rest when you can. Your growing baby is putting more pressure on your bladder. So you may need to urinate more often. Hemorrhoids are also common. These are painful, itchy veins in the rectal area. In the 36th week, most women have a test for group B streptococcus (GBS). GBS is a common bacteria that can live in the vagina and rectum. It can make your baby sick after birth. If you test positive, you will get antibiotics during labor. These will keep your baby from getting the bacteria. You may want to talk with your doctor about banking your baby's umbilical cord blood. This is the blood left in the cord after birth. If you want to save this blood, you must arrange it ahead of time. You can't decide at the last minute.  
If you haven't already had the Tdap shot during this pregnancy, talk to your doctor about getting it. It will help protect your  against pertussis infection. Follow-up care is a key part of your treatment and safety. Be sure to make and go to all appointments, and call your doctor if you are having problems. It's also a good idea to know your test results and keep a list of the medicines you take. How can you care for yourself at home? Ease hemorrhoids · Get more liquids, fruits, vegetables, and fiber in your diet. This will help keep your stools soft. · Avoid sitting for too long. Lie on your left side several times a day. · Clean yourself with soft, moist toilet paper. Or you can use witch hazel pads or personal hygiene pads. · If you are uncomfortable, try ice packs. Or you can sit in a warm sitz bath. Do these for 20 minutes at a time, as needed. · Use hydrocortisone cream for pain and itching. Two examples are Anusol and Preparation H Hydrocortisone. · Ask your doctor about taking an over-the-counter stool softener. Consider breastfeeding · Experts recommend that women breastfeed for 1 year or longer. Breast milk is the perfect food for babies. · Breast milk is easier for babies to digest than formula. And it is always available, just the right temperature, and free. · In general, babies who are  are healthier than formula-fed babies. ¨  babies are less likely to get ear infections, colds, diarrhea, and pneumonia. ¨  babies who are fed only breast milk are less likely to get asthma and allergies. ¨  babies are less likely to be obese. ¨  babies are less likely to get diabetes or heart disease. · Women who breastfeed have less bleeding after the birth. Their uteruses also shrink back faster. · Some women who breastfeed lose weight faster. Making milk burns calories. · Breastfeeding can lower your risk of breast cancer, ovarian cancer, and osteoporosis. Decide about circumcision for boys · As you make this decision, it may help to think about your personal, [employer]ian, and family traditions. You get to decide if you will keep your son's penis natural or if he will be circumcised. · If you decide that you would like to have your baby circumcised, talk with your doctor. You can share your concerns about pain. And you can discuss your preferences for anesthesia. Where can you learn more? Go to http://henrique-cesar.info/. Enter F499 in the search box to learn more about \"Weeks 32 to 34 of Your Pregnancy: Care Instructions. \" Current as of: March 16, 2017 Content Version: 11.4 © 3191-5328 [employer]. Care instructions adapted under license by [employer] (which disclaims liability or warranty for this information). If you have questions about a medical condition or this instruction, always ask your healthcare professional. [address] any warranty or liability for your use of this information. Introducing [facility]! [facility] introduces Grinb[employer] patient portal. Now you can access parts of your medical record, email your doctor's office, and request medication refills online. 1. In your internet browser, go to [email or web address] 2. Click on the First Time User? Click Here link in the Sign In box. You will see the New Member Sign Up page. 3. Enter your Grinbath Access Code exactly as it appears below. You will not need to use this code after youve completed the sign-up process. If you do not sign up before the expiration date, you must request a new code. · GRUZOBZORt Access Code: [email or web address] Expires: 4/15/2018  9:57 AM 
 
4. Enter the last four digits of your Social Security Number (xxxx) and Date of Birth (mm/dd/yyyy) as indicated and click Submit. You will be taken to the next sign-up page. 5. Create a GRUZOBZORt ID.  This will be your Grinbath login ID and cannot be changed, so think of one that is secure and easy to remember. 6. Create a Stabilitech password. You can change your password at any time. 7. Enter your Password Reset Question and Answer. This can be used at a later time if you forget your password. 8. Enter your e-mail address. You will receive e-mail notification when new information is available in [address] 9. Click Sign Up. You can now view and download portions of your medical record. 10. Click the Download Summary menu link to download a portable copy of your medical information. If you have questions, please visit the Frequently Asked Questions section of the Stabilitech website. Remember, Stabilitech is NOT to be used for urgent needs. For medical emergencies, dial 911. Now available from your iPhone and Android! Please provide this summary of care documentation to your next provider. Your primary care clinician is listed as NONE. If you have any questions after today's visit, please call [phone].

## 2018-03-01 NOTE — PROGRESS NOTES
S: Feeling well. No significant complaints or concerns. Consistent, daily fetal mvmt. No ctxs, LOF, or vaginal bldng. Feeling very well, excited for baby. Has completed birth plan, will bring to next appt. O: See prenatal flowsheet for maternal and fetal exam  Blood pressure 121/75, pulse (!) 117, height 5' 2\" (1.575 m), weight 177 lb 5 oz (80.4 kg), last menstrual period 07/10/2017. A:  33w3d   Size=Dates    P:   Reviewed common pregnancy changes and discomfort as well as comfort measures. See prenatal checklist for other topics covered during visit today  Discussed collect GBS next visit  Start probiotic, twice daily until GBS collection. RTO in 2 weeks for CHRISTEN with CNM      Discussed recommendation for flu vaccination during pregnancy including flu is more likely to cause severe illness in pregnant women than in women who are not pregnant,  getting a flu shot is the first and most important step in protecting against flu, the flu shot given during pregnancy has been shown to protect both the mother and her baby (up to 7 months old) from flu, and flu shots are a safe way to protect the mother and her unborn child from serious illness and complications of flu. Pt DECLINES flu vaccine today.

## 2018-03-13 ENCOUNTER — HOSPITAL ENCOUNTER (INPATIENT)
Age: 28
LOS: 1 days | Discharge: HOME | DRG: 566 | End: 2018-03-14
Attending: OBSTETRICS & GYNECOLOGY | Admitting: ADVANCED PRACTICE MIDWIFE
Payer: MEDICAID

## 2018-03-13 ENCOUNTER — TELEPHONE (OUTPATIENT)
Dept: OBGYN CLINIC | Age: 28
End: 2018-03-13

## 2018-03-13 PROBLEM — Z34.90 PREGNANCY: Status: ACTIVE | Noted: 2018-03-13

## 2018-03-13 LAB
BASOPHILS # BLD: 0 K/UL (ref 0–0.1)
BASOPHILS NFR BLD: 0 % (ref 0–1)
DIFFERENTIAL METHOD BLD: ABNORMAL
EOSINOPHIL # BLD: 0 K/UL (ref 0–0.4)
EOSINOPHIL NFR BLD: 0 % (ref 0–7)
ERYTHROCYTE [DISTWIDTH] IN BLOOD BY AUTOMATED COUNT: 14.2 % (ref 11.5–14.5)
HCT VFR BLD AUTO: 36.1 % (ref 35–47)
HGB BLD-MCNC: 12.4 G/DL (ref 11.5–16)
IMM GRANULOCYTES # BLD: 0 K/UL
IMM GRANULOCYTES NFR BLD AUTO: 0 %
LYMPHOCYTES # BLD: 3.9 K/UL (ref 0.8–3.5)
LYMPHOCYTES NFR BLD: 18 % (ref 12–49)
MCH RBC QN AUTO: 33.4 PG (ref 26–34)
MCHC RBC AUTO-ENTMCNC: 34.3 G/DL (ref 30–36.5)
MCV RBC AUTO: 97.3 FL (ref 80–99)
MONOCYTES # BLD: 1.1 K/UL (ref 0–1)
MONOCYTES NFR BLD: 5 % (ref 5–13)
MYELOCYTES NFR BLD MANUAL: 1 %
NEUTS BAND NFR BLD MANUAL: 3 % (ref 0–6)
NEUTS SEG # BLD: 16.5 K/UL (ref 1.8–8)
NEUTS SEG NFR BLD: 73 % (ref 32–75)
NRBC # BLD: 0 K/UL (ref 0–0.01)
NRBC BLD-RTO: 0 PER 100 WBC
PLATELET # BLD AUTO: 310 K/UL (ref 150–400)
PLATELET COMMENTS,PCOM: ABNORMAL
PMV BLD AUTO: 10.3 FL (ref 8.9–12.9)
RBC # BLD AUTO: 3.71 M/UL (ref 3.8–5.2)
RBC MORPH BLD: ABNORMAL
WBC # BLD AUTO: 21.7 K/UL (ref 3.6–11)

## 2018-03-13 PROCEDURE — 86900 BLOOD TYPING SEROLOGIC ABO: CPT | Performed by: ADVANCED PRACTICE MIDWIFE

## 2018-03-13 PROCEDURE — 85025 COMPLETE CBC W/AUTO DIFF WBC: CPT | Performed by: ADVANCED PRACTICE MIDWIFE

## 2018-03-13 PROCEDURE — 36415 COLL VENOUS BLD VENIPUNCTURE: CPT | Performed by: ADVANCED PRACTICE MIDWIFE

## 2018-03-13 PROCEDURE — 4A0HXCZ MEASUREMENT OF PRODUCTS OF CONCEPTION, CARDIAC RATE, EXTERNAL APPROACH: ICD-10-PCS | Performed by: OBSTETRICS & GYNECOLOGY

## 2018-03-13 PROCEDURE — 99285 EMERGENCY DEPT VISIT HI MDM: CPT

## 2018-03-13 PROCEDURE — 96360 HYDRATION IV INFUSION INIT: CPT

## 2018-03-13 PROCEDURE — 74011250636 HC RX REV CODE- 250/636: Performed by: ADVANCED PRACTICE MIDWIFE

## 2018-03-13 PROCEDURE — 65270000029 HC RM PRIVATE

## 2018-03-13 PROCEDURE — 59025 FETAL NON-STRESS TEST: CPT

## 2018-03-13 RX ORDER — ACETAMINOPHEN 325 MG/1
650 TABLET ORAL
Status: DISCONTINUED | OUTPATIENT
Start: 2018-03-13 | End: 2018-03-14 | Stop reason: HOSPADM

## 2018-03-13 RX ORDER — SODIUM CHLORIDE, SODIUM LACTATE, POTASSIUM CHLORIDE, CALCIUM CHLORIDE 600; 310; 30; 20 MG/100ML; MG/100ML; MG/100ML; MG/100ML
125 INJECTION, SOLUTION INTRAVENOUS CONTINUOUS
Status: DISCONTINUED | OUTPATIENT
Start: 2018-03-13 | End: 2018-03-14 | Stop reason: HOSPADM

## 2018-03-13 RX ORDER — SODIUM CHLORIDE 0.9 % (FLUSH) 0.9 %
5-10 SYRINGE (ML) INJECTION AS NEEDED
Status: DISCONTINUED | OUTPATIENT
Start: 2018-03-13 | End: 2018-03-14 | Stop reason: HOSPADM

## 2018-03-13 RX ORDER — ZOLPIDEM TARTRATE 5 MG/1
5 TABLET ORAL
Status: DISCONTINUED | OUTPATIENT
Start: 2018-03-13 | End: 2018-03-14 | Stop reason: HOSPADM

## 2018-03-13 RX ORDER — HYDROCODONE BITARTRATE AND ACETAMINOPHEN 5; 325 MG/1; MG/1
1 TABLET ORAL
Status: DISCONTINUED | OUTPATIENT
Start: 2018-03-13 | End: 2018-03-14 | Stop reason: HOSPADM

## 2018-03-13 RX ORDER — NALOXONE HYDROCHLORIDE 0.4 MG/ML
0.4 INJECTION, SOLUTION INTRAMUSCULAR; INTRAVENOUS; SUBCUTANEOUS AS NEEDED
Status: DISCONTINUED | OUTPATIENT
Start: 2018-03-13 | End: 2018-03-14 | Stop reason: HOSPADM

## 2018-03-13 RX ORDER — MAG HYDROX/ALUMINUM HYD/SIMETH 200-200-20
30 SUSPENSION, ORAL (FINAL DOSE FORM) ORAL
Status: DISCONTINUED | OUTPATIENT
Start: 2018-03-13 | End: 2018-03-14 | Stop reason: HOSPADM

## 2018-03-13 RX ORDER — SODIUM CHLORIDE 0.9 % (FLUSH) 0.9 %
5-10 SYRINGE (ML) INJECTION EVERY 8 HOURS
Status: DISCONTINUED | OUTPATIENT
Start: 2018-03-13 | End: 2018-03-14 | Stop reason: HOSPADM

## 2018-03-13 RX ADMIN — SODIUM CHLORIDE, SODIUM LACTATE, POTASSIUM CHLORIDE, AND CALCIUM CHLORIDE 1000 ML: 600; 310; 30; 20 INJECTION, SOLUTION INTRAVENOUS at 15:30

## 2018-03-13 RX ADMIN — SODIUM CHLORIDE, SODIUM LACTATE, POTASSIUM CHLORIDE, AND CALCIUM CHLORIDE 125 ML/HR: 600; 310; 30; 20 INJECTION, SOLUTION INTRAVENOUS at 23:22

## 2018-03-13 NOTE — PROGRESS NOTES
[facility] Labor Progress Note     Patient: Sumeet Smith MRN: [id number]  SSN: [id number]    YOB: 1990  Age: 32 y.o. Sex: female        Subjective:   Patient resting in bed. More relaxed. Continues to have lower back cramping and pressure. Is feeling some light uterine tightening. Objective:   Blood pressure 120/84, pulse (!) 109, temperature 98.5 °F (36.9 °C), resp. rate 18, height 5' 2\" (1.575 m), weight 177 lb 5 oz (80.4 kg), last menstrual period 07/10/2017, SpO2 98 %, currently breastfeeding. Fetal heart baseline 135,  moderate variability, positive  accelerations,  negative decelerations, Uterine contractions irregularly, mild to palpation, resting tone soft, Sterile Vaginal Exam: deferred fetal presentation vertex, membranes intact. No vaginal bleeding noted at this time. Assessment:     35w1d  Category 1 fetal heart rate tracing   Antepartum vaginal bleeding      Plan:   Continue current orders/management   CNM co-management with MD  Consulted with Dr. Hernandez Foster. Will keep client overnight for observation and get MFM consult tomorrow.         Cesario Cabrera CNM

## 2018-03-13 NOTE — PROGRESS NOTES
CNM Progress Note     Patient: Miguelito Dominguez MRN: [id number]  SSN: [id number]    YOB: 1990  Age: 32 y.o. Sex: female        Subjective:   Patient resting in bed. Reports she is now more relaxed. Has not had anymore bleeding. No longer feeling uterine contractions. Objective:   Blood pressure 120/84, pulse (!) 109, temperature 98.5 °F (36.9 °C), resp. rate 18, height 5' 2\" (1.575 m), weight 177 lb 5 oz (80.4 kg), last menstrual period 07/10/2017, SpO2 98 %, currently breastfeeding. Fetal heart baseline 130, moderate variability, positive  accelerations,  negative decelerations, Uterine contractions irregularly, mild  to palpation, resting tone soft, Sterile Vaginal Exam:deferred, fetal presentation vertex, membranes intact     No active bleeding noted. Assessment:     35w1d  Category 1 fetal heart rate tracing   Observation for vaginal bleeding in third trimester      Plan:   Continue current orders/management   MFM consult ordered for AM  Continuous monitoring overnight. May take ambien for sleep.    CNM co-management with MD Hilda Hernandez CNM

## 2018-03-13 NOTE — IP AVS SNAPSHOT
Dionyjose Awe 
 
 
 [address] [facility] [address] [facility] [address] [place] [address] 
[phone] Patient: Melonie Allen MRN: [id number] About your hospitalization You were admitted on:  2018 You last received care in the:  [facility] 2 LABOR & DELIVERY You were discharged on:  2018 Why you were hospitalized Your primary diagnosis was:  Not on File Your diagnoses also included:  Pregnancy Follow-up Information Follow up With Details Comments Contact Info None   None ([phone]) Patient stated that they have no PCP Miriam Ramirez CNM Go in 1 day Keep appt for tomorrow 3/15 for follow up [address] [place] [address] [address] [place] [address] 
[phone] Miriam Ramirez CNM Schedule an appointment as soon as possible for a visit in 1 week Make weekly appointments with midwife [address] [place] [address] [address] [place] [address] 
[phone] [facility]  CENTER Schedule an appointment as soon as possible for a visit in 1 week Make appointment with M/ center weekly [address] [place] [address] [address] 
[phone] Your Scheduled Appointments Thursday March 15, 2018  1:30 PM EDT  
OB VISIT with OLIVER Weiss OB-GYN [place] [address] ([facility]) [address] [facility]. [address] [facility]  
[phone] Discharge Orders None A check bernabe indicates which time of day the medication should be taken. My Medications ASK your doctor about these medications Instructions Each Dose to Equal  
 Morning Noon Evening Bedtime PRENATAL PO Your last dose was: Your next dose is: Take  by mouth. PROBIOTIC PO Your last dose was: Your next dose is: Take  by mouth. Discharge Instructions Placental Abruption: Care Instructions Your Care Instructions The placenta forms during pregnancy to give nutrients and oxygen to the baby. It also removes waste products. Normally, the placenta attaches to the wall of the uterus until the baby is born. Sometimes, the placenta separates from the uterus before birth. This is called placental abruption. It also may be called placenta abruptio. Your doctor will watch your condition closely to make sure you and your baby are okay. A minor abruption can sometimes be watched closely until delivery. But any bleeding or pain during pregnancy is cause for concern. Call your doctor if you have any bleeding or pain. Sometimes a  delivery must be done. Follow-up care is a key part of your treatment and safety. Be sure to make and go to all appointments, and call your doctor if you are having problems. It's also a good idea to know your test results and keep a list of the medicines you take. How can you care for yourself at home? · Do not do any heavy activity. Do not run or lift anything that weighs more than 20 pounds. · Do not smoke. It can limit the blood flow to your baby. If you need help quitting, talk to your doctor about stop-smoking programs and medicines. These can increase your chances of quitting for good. · Ask your doctor whether you can have sexual intercourse. · Do not put anything into your vagina. · Have a phone nearby at all times in case you begin to bleed and need to call your doctor right away. When should you call for help? Call 911 anytime you think you may need emergency care. For example, call if: 
? · You passed out (lost consciousness). ? · You have severe vaginal bleeding. ?Call your doctor now or seek immediate medical care if: 
? · You have any vaginal bleeding. ? · You have pain in your belly or pelvis. ? · You think that you are in labor. ? · You have a sudden release of fluid from your vagina. ? · You notice that your baby has stopped moving or is moving much less than normal. ? Watch closely for changes in your health, and be sure to contact your doctor if you have any questions or concerns. Where can you learn more? Go to http://henrique-cesar.info/. Enter V997 in the search box to learn more about \"Placental Abruption: Care Instructions. \" Current as of: 2017 Content Version: 11.4 © 5217-0815 [employer]. Care instructions adapted under license by [employer] (which disclaims liability or warranty for this information). If you have questions about a medical condition or this instruction, always ask your healthcare professional. [address] any warranty or liability for your use of this information. Weeks 34 to 36 of Your Pregnancy: Care Instructions Your Care Instructions By now, your baby and your belly have grown quite large. It is almost time to give birth. A full-term pregnancy can deliver between 37 and 42 weeks. Your baby's lungs are almost ready to breathe air. The bones in your baby's head are now firm enough to protect it, but soft enough to move down through the birth canal. 
You may feel excited, happy, anxious, or scared. You may wonder how you will know if you are in labor or what to expect during labor. Try to be flexible in your expectations of the birth. Because each birth is different, there is no way to know exactly what childbirth will be like for you. This care sheet will help you know what to expect and how to prepare. This may make your childbirth easier. If you haven't already had the Tdap shot during this pregnancy, talk to your doctor about getting it. It will help protect your  against pertussis infection. In the 36th week, most women have a test for group B streptococcus (GBS). GBS is a common bacteria that can live in the vagina and rectum.  It can make your baby sick after birth. If you test positive, you will get antibiotics during labor. The medicine will keep your baby from getting the bacteria. Follow-up care is a key part of your treatment and safety. Be sure to make and go to all appointments, and call your doctor if you are having problems. It's also a good idea to know your test results and keep a list of the medicines you take. How can you care for yourself at home? Learn about pain relief choices · Pain is different for every woman. Talk with your doctor about your feelings about pain. · You can choose from several types of pain relief. These include medicine or breathing techniques, as well as comfort measures. You can use more than one option. · If you choose to have pain medicine during labor, talk to your doctor about your options. Some medicines lower anxiety and help with some of the pain. Others make your lower body numb so that you won't feel pain. · Be sure to tell your doctor about your pain medicine choice before you start labor or very early in your labor. You may be able to change your mind as labor progresses. · Rarely, a woman is put to sleep by medicine given through a mask or an IV. Labor and delivery · The first stage of labor has three parts: early, active, and transition. ¨ Most women have early labor at home. You can stay busy or rest, eat light snacks, drink clear fluids, and start counting contractions. ¨ When talking during a contraction gets hard, you may be moving to active labor. During active labor, you should head for the hospital if you are not there already. ¨ You are in active labor when contractions come every 3 to 4 minutes and last about 60 seconds. Your cervix is opening more rapidly. ¨ If your water breaks, contractions will come faster and stronger. ¨ During transition, your cervix is stretching, and contractions are coming more rapidly. ¨ You may want to push, but your cervix might not be ready. Your doctor will tell you when to push. · The second stage starts when your cervix is completely opened and you are ready to push. ¨ Contractions are very strong to push the baby down the birth canal. 
¨ You will feel the urge to push. You may feel like you need to have a bowel movement. ¨ You may be coached to push with contractions. These contractions will be very strong, but you will not have them as often. You can get a little rest between contractions. ¨ You may be emotional and irritable. You may not be aware of what is going on around you. ¨ One last push, and your baby is born. · The third stage is when a few more contractions push out the placenta. This may take 30 minutes or less. · The fourth stage is the welcome recovery. You may feel overwhelmed with emotions and exhausted but alert. This is a good time to start breastfeeding. Where can you learn more? Go to http://henrique-cesar.info/. Enter L973 in the search box to learn more about \"Weeks 34 to 36 of Your Pregnancy: Care Instructions. \" Current as of: March 16, 2017 Content Version: 11.4 © 0091-1947 [employer]. Care instructions adapted under license by [employer] (which disclaims liability or warranty for this information). If you have questions about a medical condition or this instruction, always ask your healthcare professional. [place] [address] any warranty or liability for your use of this information. Vaginal Bleeding During Pregnancy: Care Instructions Your Care Instructions Many women have some vaginal bleeding when they are pregnant. In some cases, the bleeding is not serious. And there aren't any more problems with the pregnancy. But sometimes bleeding is a sign of a more serious problem. This is more common if the bleeding is heavy or painful.  Examples of more serious problems include miscarriage, an ectopic pregnancy, or a problem with the placenta. You may have to see your doctor again to be sure everything is okay. You may also need more tests to find the cause of the bleeding. For some women, home treatment is all they need. But it depends on what is causing the bleeding. Be sure to tell your doctor if you have any new symptoms or if your symptoms get worse. The doctor has checked you carefully, but problems can develop later. If you notice any problems or new symptoms, get medical treatment right away. Follow-up care is a key part of your treatment and safety. Be sure to make and go to all appointments, and call your doctor if you are having problems. It's also a good idea to know your test results and keep a list of the medicines you take. How can you care for yourself at home? · If your doctor prescribed medicines, take them exactly as directed. Call your doctor if you think you are having a problem with your medicine. · Do not have sex until the bleeding stops and your doctor says it's okay. · Ask your doctor about other activities you can or can't do. · Get a lot of rest. Being pregnant can make you tired. · Eat a healthy diet. Include a lot of peas, beans, and leafy green vegetables. Talk to a dietitian if you need help planning your diet. · Do not use nonsteroidal anti-inflammatory drugs (NSAIDs), such as ibuprofen (Advil, Motrin), naproxen (Aleve), or aspirin, unless your doctor says it is okay. When should you call for help? Call 911 anytime you think you may need emergency care. For example, call if: 
? · You passed out (lost consciousness). ? · You have severe vaginal bleeding. ?Call your doctor now or seek immediate medical care if: 
? · You have any vaginal bleeding. ? · You have pain in your belly or pelvis. ? · You think that you are in labor. ? · You have a sudden release of fluid from your vagina. ? · You notice that your baby has stopped moving or is moving much less than normal. ? Watch closely for changes in your health, and be sure to contact your doctor if you have any questions or concerns. Where can you learn more? Go to http://henrique-cesar.info/. Enter Z073 in the search box to learn more about \"Vaginal Bleeding During Pregnancy: Care Instructions. \" Current as of: March 16, 2017 Content Version: 11.4 © 6598-8580 [employer] Care instructions adapted under license by [employer] (which disclaims liability or warranty for this information). If you have questions about a medical condition or this instruction, always ask your healthcare professional. [address] any warranty or liability for your use of this information. Counting Your Baby's Kicks: Care Instructions Your Care Instructions Counting your baby's kicks is one way your doctor can tell that your baby is healthy. Most women-especially in a first pregnancy-feel their baby move for the first time between 16 and 22 weeks. The movement may feel like flutters rather than kicks. Your baby may move more at certain times of the day. When you are active, you may notice less kicking than when you are resting. At your prenatal visits, your doctor will ask whether the baby is active. In your last trimester, your doctor may ask you to count the number of times you feel your baby move. Follow-up care is a key part of your treatment and safety. Be sure to make and go to all appointments, and call your doctor if you are having problems. It's also a good idea to know your test results and keep a list of the medicines you take. How do you count fetal kicks? · A common method of checking your baby's movement is to count the number of kicks or moves you feel in 1 hour.  Ten movements (such as kicks, flutters, or rolls) in 1 hour are normal. Some doctors suggest that you count in the morning until you get to 10 movements. Then you can quit for that day and start again the next day. · Pick your baby's most active time of day to count. This may be any time from morning to evening. · If you do not feel 10 movements in an hour, your baby may be sleeping. Wait for the next hour and count again. When should you call for help? Call your doctor now or seek immediate medical care if: 
? · You noticed that your baby has stopped moving or is moving much less than normal. ? Watch closely for changes in your health, and be sure to contact your doctor if you have any problems. Where can you learn more? Go to http://henrique-cesar.info/. Enter R856 in the search box to learn more about \"Counting Your Baby's Kicks: Care Instructions. \" Current as of: March 16, 2017 Content Version: 11.4 © 1552-3971 [employer]. Care instructions adapted under license by [employer] (which disclaims liability or warranty for this information). If you have questions about a medical condition or this instruction, always ask your healthcare professional. [place] [address] any warranty or liability for your use of this information. Introducing [facility]! [facility] introduces ImageSpike patient portal. Now you can access parts of your medical record, email your doctor's office, and request medication refills online. 1. In your internet browser, go to [email or web address] 2. Click on the First Time User? Click Here link in the Sign In box. You will see the New Member Sign Up page. 3. Enter your ImageSpike Access Code exactly as it appears below. You will not need to use this code after youve completed the sign-up process. If you do not sign up before the expiration date, you must request a new code. · ImageSpike Access Code: [email or web address] Expires: 4/15/2018 10:57 AM 
 
 4. Enter the last four digits of your Social Security Number (xxxx) and Date of Birth (mm/dd/yyyy) as indicated and click Submit. You will be taken to the next sign-up page. 5. Create a [email or web address] ID. This will be your [email or web address] login ID and cannot be changed, so think of one that is secure and easy to remember. 6. Create a [email or web address] password. You can change your password at any time. 7. Enter your Password Reset Question and Answer. This can be used at a later time if you forget your password. 8. Enter your e-mail address. You will receive e-mail notification when new information is available in [address] 9. Click Sign Up. You can now view and download portions of your medical record. 10. Click the Download Summary menu link to download a portable copy of your medical information. If you have questions, please visit the Frequently Asked Questions section of the [email or web address] website. Remember, [email or web address] is NOT to be used for urgent needs. For medical emergencies, dial 911. Now available from your iPhone and Android! Providers Seen During Your Hospitalization Provider Specialty Primary office phone Kyle Mohamud MD Obstetrics & Gynecology [phone] Your Primary Care Physician (PCP) Primary Care Physician Office Phone Office Fax NONE ** None ** ** None ** You are allergic to the following Allergen Reactions Bee Sting (Sting, Bee) Swelling Recent Documentation Height Weight Breastfeeding? BMI OB Status Smoking Status 1.575 m 80.4 kg Yes 32.43 kg/m2 Pregnant Former Smoker Emergency Contacts Name Discharge Info Relation Home Work Mobile [address] CAREGIVER [3] Boyfriend [17]   [phone] Patient Belongings The following personal items are in your possession at time of discharge: 
  Dental Appliances: None  Visual Aid: Glasses, With patient      Home Medications: None   Jewelry: Necklace  Clothing:  At bedside    Other Valuables: Cell Phone, Carolina Carter Please provide this summary of care documentation to your next provider. Signatures-by signing, you are acknowledging that this After Visit Summary has been reviewed with you and you have received a copy. Patient Signature:  ____________________________________________________________ Date:  ____________________________________________________________  
  
[place] Current Provider Signature:  ____________________________________________________________ Date:  ____________________________________________________________

## 2018-03-13 NOTE — TELEPHONE ENCOUNTER
Patient is a Cedric Bird [facility] patient that is 28 w 1 day. She states that she has been having some severe abdominal pains in the mornings only that have improved throughout the day. She has been feeling good fetal movement (at least 5 kicks per hour) even this morning. No rupture of fluids known before a gush of blood as she got in the car to go somewhere. She was Her blood is bright red and she went inside of the bathroom to cleanup and there really wasn't any more blood except with wiping. Patient was frightened to urinate while on the phone with me but started her urine flow and she states all was normal.        I spoke with Michele Roy working with [facility] and was asked to transfer the patient upstairs via phone ext [phone]. Done.

## 2018-03-13 NOTE — IP AVS SNAPSHOT
Summary of Care Report The Summary of Care report has been created to help improve care coordination. Users with access to [employer] or [address] (Web-based application) may access additional patient information including the Discharge Summary. If you are not currently a [address] user and need more information, please call the number listed below in the [place] [address] section and ask to be connected with Medical Records. Facility Information Name Address Phone [address] [place] [address] [phone] Patient Information Patient Name Sex  Izabella Bonilla ([id number]) Female 1990 Discharge Information Admitting Provider Service Area Unit Clara Mckenzie CNM / [phone] [address] 2 Labor & Delivery / [phone] Discharge Provider Discharge Date/Time Discharge Disposition Destination (none) 3/14/2018 Afternoon (Pending) AHR (none) Patient Language Language ENGLISH [13] Hospital Problems as of 3/14/2018  Reviewed: 3/1/2018  3:54 PM by Clara Mckenzie CNM Class Noted - Resolved Last Modified POA Active Problems Pregnancy  3/13/2018 - Present 3/13/2018 by Clara Mckenzie CNM Unknown Entered by Clara Mckenzie CNM Non-Hospital Problems as of 3/14/2018  Reviewed: 3/1/2018  3:54 PM by Clara Mckenzie CNM Class Noted - Resolved Last Modified Active Problems Normal pregnancy in multigravida  2017 - Present 3/1/2018 by Clara Mckenzie CNM Entered by Jacqueline Tesfaye CNM Overview Signed 3/1/2018  3:46 PM by Clara Mckenzie CNM Baby Boy: Og Oliver You are allergic to the following Allergen Reactions Bee Sting (Sting, Bee) Swelling Current Discharge Medication List  
  
 ASK your doctor about these medications Dose & Instructions Dispensing Information Comments PRENATAL PO Take  by mouth. Refills:  0 PROBIOTIC PO Take  by mouth. Refills:  0 Current Immunizations Name Date Tdap 2/15/2018 Follow-up Information Follow up With Details Comments Contact Info None   None ([phone]) Patient stated that they have no PCP Baljinder Friedman CNM Go in 1 day Keep appt for tomorrow 3/15 for follow up [address] [facility] [address] [place] [address] 
[phone] Baljinder Friedman CNM Schedule an appointment as soon as possible for a visit in 1 week Make weekly appointments with midwife [address] [facility] [address] [place] [address] 
[phone] [facility]  CENTER Schedule an appointment as soon as possible for a visit in 1 week Make appointment with [facility]/ center weekly [address] [facility] [address] [facility] 
[phone] Discharge Instructions Placental Abruption: Care Instructions Your Care Instructions The placenta forms during pregnancy to give nutrients and oxygen to the baby. It also removes waste products. Normally, the placenta attaches to the wall of the uterus until the baby is born. Sometimes, the placenta separates from the uterus before birth. This is called placental abruption. It also may be called placenta abruptio. Your doctor will watch your condition closely to make sure you and your baby are okay. A minor abruption can sometimes be watched closely until delivery. But any bleeding or pain during pregnancy is cause for concern. Call your doctor if you have any bleeding or pain. Sometimes a  delivery must be done. Follow-up care is a key part of your treatment and safety. Be sure to make and go to all appointments, and call your doctor if you are having problems.  It's also a good idea to know your test results and keep a list of the medicines you take. How can you care for yourself at home? · Do not do any heavy activity. Do not run or lift anything that weighs more than 20 pounds. · Do not smoke. It can limit the blood flow to your baby. If you need help quitting, talk to your doctor about stop-smoking programs and medicines. These can increase your chances of quitting for good. · Ask your doctor whether you can have sexual intercourse. · Do not put anything into your vagina. · Have a phone nearby at all times in case you begin to bleed and need to call your doctor right away. When should you call for help? Call 911 anytime you think you may need emergency care. For example, call if: 
? · You passed out (lost consciousness). ? · You have severe vaginal bleeding. ?Call your doctor now or seek immediate medical care if: 
? · You have any vaginal bleeding. ? · You have pain in your belly or pelvis. ? · You think that you are in labor. ? · You have a sudden release of fluid from your vagina. ? · You notice that your baby has stopped moving or is moving much less than normal. ? Watch closely for changes in your health, and be sure to contact your doctor if you have any questions or concerns. Where can you learn more? Go to http://henrique-cesar.info/. Enter C659 in the search box to learn more about \"Placental Abruption: Care Instructions. \" Current as of: March 16, 2017 Content Version: 11.4 © 2212-8320 [employer]. Care instructions adapted under license by [employer] (which disclaims liability or warranty for this information). If you have questions about a medical condition or this instruction, always ask your healthcare professional. [place] [address] any warranty or liability for your use of this information. Weeks 34 to 36 of Your Pregnancy: Care Instructions Your Care Instructions By now, your baby and your belly have grown quite large. It is almost time to give birth. A full-term pregnancy can deliver between 37 and 42 weeks. Your baby's lungs are almost ready to breathe air. The bones in your baby's head are now firm enough to protect it, but soft enough to move down through the birth canal. 
You may feel excited, happy, anxious, or scared. You may wonder how you will know if you are in labor or what to expect during labor. Try to be flexible in your expectations of the birth. Because each birth is different, there is no way to know exactly what childbirth will be like for you. This care sheet will help you know what to expect and how to prepare. This may make your childbirth easier. If you haven't already had the Tdap shot during this pregnancy, talk to your doctor about getting it. It will help protect your  against pertussis infection. In the 36th week, most women have a test for group B streptococcus (GBS). GBS is a common bacteria that can live in the vagina and rectum. It can make your baby sick after birth. If you test positive, you will get antibiotics during labor. The medicine will keep your baby from getting the bacteria. Follow-up care is a key part of your treatment and safety. Be sure to make and go to all appointments, and call your doctor if you are having problems. It's also a good idea to know your test results and keep a list of the medicines you take. How can you care for yourself at home? Learn about pain relief choices · Pain is different for every woman. Talk with your doctor about your feelings about pain. · You can choose from several types of pain relief. These include medicine or breathing techniques, as well as comfort measures. You can use more than one option. · If you choose to have pain medicine during labor, talk to your doctor about your options.  Some medicines lower anxiety and help with some of the pain. Others make your lower body numb so that you won't feel pain. · Be sure to tell your doctor about your pain medicine choice before you start labor or very early in your labor. You may be able to change your mind as labor progresses. · Rarely, a woman is put to sleep by medicine given through a mask or an IV. Labor and delivery · The first stage of labor has three parts: early, active, and transition. ¨ Most women have early labor at home. You can stay busy or rest, eat light snacks, drink clear fluids, and start counting contractions. ¨ When talking during a contraction gets hard, you may be moving to active labor. During active labor, you should head for the hospital if you are not there already. ¨ You are in active labor when contractions come every 3 to 4 minutes and last about 60 seconds. Your cervix is opening more rapidly. ¨ If your water breaks, contractions will come faster and stronger. ¨ During transition, your cervix is stretching, and contractions are coming more rapidly. ¨ You may want to push, but your cervix might not be ready. Your doctor will tell you when to push. · The second stage starts when your cervix is completely opened and you are ready to push. ¨ Contractions are very strong to push the baby down the birth canal. 
¨ You will feel the urge to push. You may feel like you need to have a bowel movement. ¨ You may be coached to push with contractions. These contractions will be very strong, but you will not have them as often. You can get a little rest between contractions. ¨ You may be emotional and irritable. You may not be aware of what is going on around you. ¨ One last push, and your baby is born. · The third stage is when a few more contractions push out the placenta. This may take 30 minutes or less. · The fourth stage is the welcome recovery. You may feel overwhelmed with emotions and exhausted but alert. This is a good time to start breastfeeding. Where can you learn more? Go to http://henrique-cesar.info/. Enter U537 in the search box to learn more about \"Weeks 34 to 36 of Your Pregnancy: Care Instructions. \" Current as of: March 16, 2017 Content Version: 11.4 © 0826-9459 [employer]. Care instructions adapted under license by [employer] (which disclaims liability or warranty for this information). If you have questions about a medical condition or this instruction, always ask your healthcare professional. [address] any warranty or liability for your use of this information. Vaginal Bleeding During Pregnancy: Care Instructions Your Care Instructions Many women have some vaginal bleeding when they are pregnant. In some cases, the bleeding is not serious. And there aren't any more problems with the pregnancy. But sometimes bleeding is a sign of a more serious problem. This is more common if the bleeding is heavy or painful. Examples of more serious problems include miscarriage, an ectopic pregnancy, or a problem with the placenta. You may have to see your doctor again to be sure everything is okay. You may also need more tests to find the cause of the bleeding. For some women, home treatment is all they need. But it depends on what is causing the bleeding. Be sure to tell your doctor if you have any new symptoms or if your symptoms get worse. The doctor has checked you carefully, but problems can develop later. If you notice any problems or new symptoms, get medical treatment right away. Follow-up care is a key part of your treatment and safety. Be sure to make and go to all appointments, and call your doctor if you are having problems. It's also a good idea to know your test results and keep a list of the medicines you take. How can you care for yourself at home? · If your doctor prescribed medicines, take them exactly as directed.  Call your doctor if you think you are having a problem with your medicine. · Do not have sex until the bleeding stops and your doctor says it's okay. · Ask your doctor about other activities you can or can't do. · Get a lot of rest. Being pregnant can make you tired. · Eat a healthy diet. Include a lot of peas, beans, and leafy green vegetables. Talk to a dietitian if you need help planning your diet. · Do not use nonsteroidal anti-inflammatory drugs (NSAIDs), such as ibuprofen (Advil, Motrin), naproxen (Aleve), or aspirin, unless your doctor says it is okay. When should you call for help? Call 911 anytime you think you may need emergency care. For example, call if: 
? · You passed out (lost consciousness). ? · You have severe vaginal bleeding. ?Call your doctor now or seek immediate medical care if: 
? · You have any vaginal bleeding. ? · You have pain in your belly or pelvis. ? · You think that you are in labor. ? · You have a sudden release of fluid from your vagina. ? · You notice that your baby has stopped moving or is moving much less than normal. ? Watch closely for changes in your health, and be sure to contact your doctor if you have any questions or concerns. Where can you learn more? Go to http://henrique-cesar.info/. Enter A892 in the search box to learn more about \"Vaginal Bleeding During Pregnancy: Care Instructions. \" Current as of: March 16, 2017 Content Version: 11.4 © 2358-3832 [employer]. Care instructions adapted under license by [employer] (which disclaims liability or warranty for this information). If you have questions about a medical condition or this instruction, always ask your healthcare professional. [address] any warranty or liability for your use of this information. Counting Your Baby's Kicks: Care Instructions Your Care Instructions Counting your baby's kicks is one way your doctor can tell that your baby is healthy. Most women-especially in a first pregnancy-feel their baby move for the first time between 16 and 22 weeks. The movement may feel like flutters rather than kicks. Your baby may move more at certain times of the day. When you are active, you may notice less kicking than when you are resting. At your prenatal visits, your doctor will ask whether the baby is active. In your last trimester, your doctor may ask you to count the number of times you feel your baby move. Follow-up care is a key part of your treatment and safety. Be sure to make and go to all appointments, and call your doctor if you are having problems. It's also a good idea to know your test results and keep a list of the medicines you take. How do you count fetal kicks? · A common method of checking your baby's movement is to count the number of kicks or moves you feel in 1 hour. Ten movements (such as kicks, flutters, or rolls) in 1 hour are normal. Some doctors suggest that you count in the morning until you get to 10 movements. Then you can quit for that day and start again the next day. · Pick your baby's most active time of day to count. This may be any time from morning to evening. · If you do not feel 10 movements in an hour, your baby may be sleeping. Wait for the next hour and count again. When should you call for help? Call your doctor now or seek immediate medical care if: 
? · You noticed that your baby has stopped moving or is moving much less than normal. ? Watch closely for changes in your health, and be sure to contact your doctor if you have any problems. Where can you learn more? Go to http://henrique-cesar.info/. Enter H477 in the search box to learn more about \"Counting Your Baby's Kicks: Care Instructions. \" Current as of: March 16, 2017 Content Version: 11.4 © 6860-3654 [employer]. Care instructions adapted under license by [employer] (which disclaims liability or warranty for this information). If you have questions about a medical condition or this instruction, always ask your healthcare professional. [place] [address] any warranty or liability for your use of this information. Chart Review Routing History No Routing History on File

## 2018-03-13 NOTE — PROGRESS NOTES
1543: spoke to Dr. Jose Johnson regarding pt complaints of bleeding and EFM strip. MD states the strip \"looks ok right now\". Dr. Jose Johnson wants midwife to evaluate pt and let Dr. Jose Johnson and/or [facility] hospitalist know.

## 2018-03-13 NOTE — PROGRESS NOTES
15:15- Pt arrived to unit for c/o vaginal bleeding. Bleeding noted in underwear on arrival. Pt also reports large gush of blood while getting into car that \"soaked my pants\". 15:16- OLIVER Menon on the way to hospital.  15:50- OLIVER Menon to speak with [facility] regarding plan of care. 16:30-MD Rocio at bedside to perform U/S for MAGALY.   19:02- OB SBAR report given to Carissa Brown

## 2018-03-13 NOTE — IP AVS SNAPSHOT
[address] 
[phone] Patient: Jayesh Mejia MRN: [id number] VISHNU:[birth year] A check bernabe indicates which time of day the medication should be taken. My Medications ASK your doctor about these medications Instructions Each Dose to Equal  
 Morning Noon Evening Bedtime PRENATAL PO Your last dose was: Your next dose is: Take  by mouth. PROBIOTIC PO Your last dose was: Your next dose is: Take  by mouth.

## 2018-03-13 NOTE — PROGRESS NOTES
1512:  Call to Yulisa Dial CNM to notify of pt arrival; received  to call back if calling to notify of pt information. Return call yielded same VM;  to notify Rozina pt has arrived under your care, please return call for patient information\"  No information regarding pt name or room number was left in VM. 1515:  Return call from [facility], who is en route to manage care. No orders received, OLIVER asks staff to have pt on monitor prior to her arrival. OLIVER informed staff is arriving pt to room at this time, EFM in place.

## 2018-03-13 NOTE — H&P
History and Physical    Patient: Chrissy Villanueva MRN: [id number]  SSN: [id number]    YOB: 1990  Age: 32 y.o. Sex: female      Subjective:      Chrissy Villanueva is a 32 y.o. female who presents to labor and delivery for bright red vaginal bleeding. Reports she has felt increased pressure for two days and about 2.5 hours ago she got in car and felt a large gush of fluid. She checked at it was bright red bleeding. She endorses good fetal movement and denies LOF. States she feels pressure, but no contractions. Reports last intercourse was approx one month ago. Pregnancy has been non eventful and had a normal course. No past medical history on file. Past Surgical History:   Procedure Laterality Date    HX WISDOM TEETH EXTRACTION  2012    HX WRIST FRACTURE TX Left 2014      Family History   Problem Relation Age of Onset    Other Mother     Heart Attack Mother 55    Cancer Mother      thyroid    Heart Attack Father 52     low blood pressure, pace maker    No Known Problems Sister     Heart Disease Maternal Grandmother     Cancer Maternal Grandfather 66     pancreatic     Social History   Substance Use Topics    Smoking status: Former Smoker     Packs/day: 0.25     Quit date: 10/20/2017    Smokeless tobacco: Never Used      Comment: Will try cold turkey for 2 weeks. Will try chantix or zyban if  not successful    Alcohol use No      Prior to Admission medications    Medication Sig Start Date End Date Taking? Authorizing Provider   LACTOBACILLUS ACIDOPHILUS (PROBIOTIC PO) Take  by mouth. Yes Historical Provider   PNV TL.09/GCBQXUI FUM/FOLIC AC (PRENATAL PO) Take  by mouth. Historical Provider        Allergies   Allergen Reactions    Bee Sting [Sting, Bee] Swelling       Review of Systems:  A comprehensive review of systems was negative except for that written in the History of Present Illness.     Objective:     Vitals:    03/13/18 1514 03/13/18 1518 03/13/18 1525 03/13/18 1530   BP: 120/84      Pulse: (!) 109      Resp: 18      Temp: 98.5 °F (36.9 °C)      SpO2:   98% 98%   Weight:  177 lb 5 oz (80.4 kg)     Height:  5' 2\" (1.575 m)          Physical Exam:  GENERAL: alert, cooperative, no distress, appears stated age  LUNG: clear to auscultation bilaterally  HEART: regular rate and rhythm, S1, S2 normal, no murmur, click, rub or gallop  ABDOMEN: soft, non-tender. Bowel sounds normal. No masses,  no organomegaly, gravid  EXTREMITIES:  extremities normal, atraumatic, no cyanosis or edema  SKIN: Normal.  NEUROLOGIC: negative    SVE: 1 with funneling/long/-3, vertex, vaginal bleeding noted, no current active bleeding. FHT: baseline 140, positive accels, variable decels. NST for 40 minutes. Cat 2    Bedside ultrasound by Dr. Travis Matos MAGALY WNL, vertex, placental calcifications, no abruption noted. Assessment:     Hospital Problems  Date Reviewed: 3/1/2018          Codes Class Noted POA    Pregnancy ICD-10-CM: Z34.90  ICD-9-CM: V22.2  3/13/2018 Unknown            Antepartum vaginal bleeding. GBS status unknown  A positive    Plan:     Consulted with Dr. Travis Matos. Will watch client overnight. Pad counts. Low threshold for fetal intolerance. CBC and type and screen. IV x 2. If no increased bleeding may eat in 2 hours. Continuous fetal monitoring. Bedside MAGLAY completed. Co-management with Dr. Travis Matos.      Signed By: Berhane Fang CNM     March 13, 2018

## 2018-03-14 VITALS
BODY MASS INDEX: 32.63 KG/M2 | DIASTOLIC BLOOD PRESSURE: 67 MMHG | RESPIRATION RATE: 14 BRPM | TEMPERATURE: 97.7 F | WEIGHT: 177.31 LBS | HEART RATE: 90 BPM | SYSTOLIC BLOOD PRESSURE: 118 MMHG | OXYGEN SATURATION: 97 % | HEIGHT: 62 IN

## 2018-03-14 PROCEDURE — 74011250636 HC RX REV CODE- 250/636: Performed by: ADVANCED PRACTICE MIDWIFE

## 2018-03-14 PROCEDURE — 76819 FETAL BIOPHYS PROFIL W/O NST: CPT | Performed by: OBSTETRICS & GYNECOLOGY

## 2018-03-14 PROCEDURE — 76805 OB US >/= 14 WKS SNGL FETUS: CPT | Performed by: OBSTETRICS & GYNECOLOGY

## 2018-03-14 RX ADMIN — Medication 10 ML: at 05:19

## 2018-03-14 RX ADMIN — SODIUM CHLORIDE, SODIUM LACTATE, POTASSIUM CHLORIDE, AND CALCIUM CHLORIDE 125 ML/HR: 600; 310; 30; 20 INJECTION, SOLUTION INTRAVENOUS at 07:24

## 2018-03-14 NOTE — DISCHARGE INSTRUCTIONS
Placental Abruption: Care Instructions  Your Care Instructions  The placenta forms during pregnancy to give nutrients and oxygen to the baby. It also removes waste products. Normally, the placenta attaches to the wall of the uterus until the baby is born. Sometimes, the placenta separates from the uterus before birth. This is called placental abruption. It also may be called placenta abruptio. Your doctor will watch your condition closely to make sure you and your baby are okay. A minor abruption can sometimes be watched closely until delivery. But any bleeding or pain during pregnancy is cause for concern. Call your doctor if you have any bleeding or pain. Sometimes a  delivery must be done. Follow-up care is a key part of your treatment and safety. Be sure to make and go to all appointments, and call your doctor if you are having problems. It's also a good idea to know your test results and keep a list of the medicines you take. How can you care for yourself at home? · Do not do any heavy activity. Do not run or lift anything that weighs more than 20 pounds. · Do not smoke. It can limit the blood flow to your baby. If you need help quitting, talk to your doctor about stop-smoking programs and medicines. These can increase your chances of quitting for good. · Ask your doctor whether you can have sexual intercourse. · Do not put anything into your vagina. · Have a phone nearby at all times in case you begin to bleed and need to call your doctor right away. When should you call for help? Call 911 anytime you think you may need emergency care. For example, call if:  ? · You passed out (lost consciousness). ? · You have severe vaginal bleeding. ?Call your doctor now or seek immediate medical care if:  ? · You have any vaginal bleeding. ? · You have pain in your belly or pelvis. ? · You think that you are in labor. ? · You have a sudden release of fluid from your vagina.    ? · You notice that your baby has stopped moving or is moving much less than normal.   ? Watch closely for changes in your health, and be sure to contact your doctor if you have any questions or concerns. Where can you learn more? Go to http://henrique-cesar.info/. Enter M103 in the search box to learn more about \"Placental Abruption: Care Instructions. \"  Current as of: 2017  Content Version: 11.4  © 4170-6708 [employer]. Care instructions adapted under license by [employer] (which disclaims liability or warranty for this information). If you have questions about a medical condition or this instruction, always ask your healthcare professional. [place] [address] any warranty or liability for your use of this information. Weeks 34 to 36 of Your Pregnancy: Care Instructions  Your Care Instructions    By now, your baby and your belly have grown quite large. It is almost time to give birth. A full-term pregnancy can deliver between 37 and 42 weeks. Your baby's lungs are almost ready to breathe air. The bones in your baby's head are now firm enough to protect it, but soft enough to move down through the birth canal.  You may feel excited, happy, anxious, or scared. You may wonder how you will know if you are in labor or what to expect during labor. Try to be flexible in your expectations of the birth. Because each birth is different, there is no way to know exactly what childbirth will be like for you. This care sheet will help you know what to expect and how to prepare. This may make your childbirth easier. If you haven't already had the Tdap shot during this pregnancy, talk to your doctor about getting it. It will help protect your  against pertussis infection. In the 36th week, most women have a test for group B streptococcus (GBS). GBS is a common bacteria that can live in the vagina and rectum. It can make your baby sick after birth.  If you test positive, you will get antibiotics during labor. The medicine will keep your baby from getting the bacteria. Follow-up care is a key part of your treatment and safety. Be sure to make and go to all appointments, and call your doctor if you are having problems. It's also a good idea to know your test results and keep a list of the medicines you take. How can you care for yourself at home? Learn about pain relief choices  · Pain is different for every woman. Talk with your doctor about your feelings about pain. · You can choose from several types of pain relief. These include medicine or breathing techniques, as well as comfort measures. You can use more than one option. · If you choose to have pain medicine during labor, talk to your doctor about your options. Some medicines lower anxiety and help with some of the pain. Others make your lower body numb so that you won't feel pain. · Be sure to tell your doctor about your pain medicine choice before you start labor or very early in your labor. You may be able to change your mind as labor progresses. · Rarely, a woman is put to sleep by medicine given through a mask or an IV. Labor and delivery  · The first stage of labor has three parts: early, active, and transition. ¨ Most women have early labor at home. You can stay busy or rest, eat light snacks, drink clear fluids, and start counting contractions. ¨ When talking during a contraction gets hard, you may be moving to active labor. During active labor, you should head for the hospital if you are not there already. ¨ You are in active labor when contractions come every 3 to 4 minutes and last about 60 seconds. Your cervix is opening more rapidly. ¨ If your water breaks, contractions will come faster and stronger. ¨ During transition, your cervix is stretching, and contractions are coming more rapidly. ¨ You may want to push, but your cervix might not be ready.  Your doctor will tell you when to push.  · The second stage starts when your cervix is completely opened and you are ready to push. ¨ Contractions are very strong to push the baby down the birth canal.  ¨ You will feel the urge to push. You may feel like you need to have a bowel movement. ¨ You may be coached to push with contractions. These contractions will be very strong, but you will not have them as often. You can get a little rest between contractions. ¨ You may be emotional and irritable. You may not be aware of what is going on around you. ¨ One last push, and your baby is born. · The third stage is when a few more contractions push out the placenta. This may take 30 minutes or less. · The fourth stage is the welcome recovery. You may feel overwhelmed with emotions and exhausted but alert. This is a good time to start breastfeeding. Where can you learn more? Go to http://henrique-cesar.info/. Enter R837 in the search box to learn more about \"Weeks 34 to 36 of Your Pregnancy: Care Instructions. \"  Current as of: March 16, 2017  Content Version: 11.4  © 9216-5661 [employer]. Care instructions adapted under license by [employer] (which disclaims liability or warranty for this information). If you have questions about a medical condition or this instruction, always ask your healthcare professional. [address] any warranty or liability for your use of this information. Vaginal Bleeding During Pregnancy: Care Instructions  Your Care Instructions    Many women have some vaginal bleeding when they are pregnant. In some cases, the bleeding is not serious. And there aren't any more problems with the pregnancy. But sometimes bleeding is a sign of a more serious problem. This is more common if the bleeding is heavy or painful. Examples of more serious problems include miscarriage, an ectopic pregnancy, or a problem with the placenta.   You may have to see your doctor again to be sure everything is okay. You may also need more tests to find the cause of the bleeding. For some women, home treatment is all they need. But it depends on what is causing the bleeding. Be sure to tell your doctor if you have any new symptoms or if your symptoms get worse. The doctor has checked you carefully, but problems can develop later. If you notice any problems or new symptoms, get medical treatment right away. Follow-up care is a key part of your treatment and safety. Be sure to make and go to all appointments, and call your doctor if you are having problems. It's also a good idea to know your test results and keep a list of the medicines you take. How can you care for yourself at home? · If your doctor prescribed medicines, take them exactly as directed. Call your doctor if you think you are having a problem with your medicine. · Do not have sex until the bleeding stops and your doctor says it's okay. · Ask your doctor about other activities you can or can't do. · Get a lot of rest. Being pregnant can make you tired. · Eat a healthy diet. Include a lot of peas, beans, and leafy green vegetables. Talk to a dietitian if you need help planning your diet. · Do not use nonsteroidal anti-inflammatory drugs (NSAIDs), such as ibuprofen (Advil, Motrin), naproxen (Aleve), or aspirin, unless your doctor says it is okay. When should you call for help? Call 911 anytime you think you may need emergency care. For example, call if:  ? · You passed out (lost consciousness). ? · You have severe vaginal bleeding. ?Call your doctor now or seek immediate medical care if:  ? · You have any vaginal bleeding. ? · You have pain in your belly or pelvis. ? · You think that you are in labor. ? · You have a sudden release of fluid from your vagina. ? · You notice that your baby has stopped moving or is moving much less than normal.   ? Watch closely for changes in your health, and be sure to contact your doctor if you have any questions or concerns. Where can you learn more? Go to http://henrique-cesar.info/. Enter M316 in the search box to learn more about \"Vaginal Bleeding During Pregnancy: Care Instructions. \"  Current as of: March 16, 2017  Content Version: 11.4  © 5609-4195 [employer]. Care instructions adapted under license by [employer] (which disclaims liability or warranty for this information). If you have questions about a medical condition or this instruction, always ask your healthcare professional. [address] any warranty or liability for your use of this information. Counting Your Baby's Kicks: Care Instructions  Your Care Instructions    Counting your baby's kicks is one way your doctor can tell that your baby is healthy. Most women-especially in a first pregnancy-feel their baby move for the first time between 16 and 22 weeks. The movement may feel like flutters rather than kicks. Your baby may move more at certain times of the day. When you are active, you may notice less kicking than when you are resting. At your prenatal visits, your doctor will ask whether the baby is active. In your last trimester, your doctor may ask you to count the number of times you feel your baby move. Follow-up care is a key part of your treatment and safety. Be sure to make and go to all appointments, and call your doctor if you are having problems. It's also a good idea to know your test results and keep a list of the medicines you take. How do you count fetal kicks? · A common method of checking your baby's movement is to count the number of kicks or moves you feel in 1 hour. Ten movements (such as kicks, flutters, or rolls) in 1 hour are normal. Some doctors suggest that you count in the morning until you get to 10 movements. Then you can quit for that day and start again the next day. · Pick your baby's most active time of day to count. This may be any time from morning to evening. · If you do not feel 10 movements in an hour, your baby may be sleeping. Wait for the next hour and count again. When should you call for help? Call your doctor now or seek immediate medical care if:  ? · You noticed that your baby has stopped moving or is moving much less than normal.   ? Watch closely for changes in your health, and be sure to contact your doctor if you have any problems. Where can you learn more? Go to http://henrique-cesar.info/. Enter Y076 in the search box to learn more about \"Counting Your Baby's Kicks: Care Instructions. \"  Current as of: March 16, 2017  Content Version: 11.4  © 8412-7541 [employer], [employer]. Care instructions adapted under license by [employer] (which disclaims liability or warranty for this information). If you have questions about a medical condition or this instruction, always ask your healthcare professional. [place] [address] any warranty or liability for your use of this information.

## 2018-03-14 NOTE — PROGRESS NOTES
1900: bedside SBAR report received from [address] [place] [address] [facility]. 1905:EVA JACKSONM at bedside for assessment - per CNM VORB patient should stay on continuous fluids and continuous monitoring throughout the night. Patient may have an [place] if desired. 1930: patient resting in bed, assisted to restroom by RN, family at bedside visiting. Patient reports scant bleeding mixed with mucus when wiping, patient educated to save pads and paper for RN - patient verbalized understanding. 2020: patient up to restroom with RN assist.  States that there is still some small bleeding and mucus discharge. Patient did not save pad per RN request, reeducated to save all paper and pads for RN. 2115: patient resting in bed, watching TV and playing on phone, will notify RN if she needs anything, no needs stated at this time. 2205: RN at bedside assisting patient with going to restroom, small-moderate amount of blood continuing to be present when patient wipes. Patient saving paper for RN.  2220: Sofia JACKSONM notified of persistent blood when wiping, will be down shortly for assessment. 2225: Sofia Rodriguez CNM at bedside for assessment, per CNM continue plan of care for now with careful observation of EFM. 2310: RN at bedside for purposeful hourly rounding, assisted patient in repositioning for comfort into lateral right position. 0000:  RN at bedside due to unusual EFM tracing with SHERRY Healy RN, patient woken up and  requests to use restroom with RN assist, scant blood when wiping. Patient rotated from lateral right to lateral left. Support with pillows offered by RN, patient resting comfortably with eyes closed. 0110: patient sleeping, no signs of distress present, will continue to monitor. 0200: patient sleeping, no signs of distress present, will continue to monitor . 0250: patient up to restroom, states that there was significantly less bleeding this time when she voided.  Assisted back to bed by RN, will continue to monitor. 0345: patient sleeping, no signs of distress or labored breathing present, will continue to monitor. 2390: patient sleeping, no signs of distress present, will continue   0515: patient up to restroom, very scant bleeding on tissue at this time, has not felt any contractions all night. Assisted back to bed, will continue to monitor. 8216: patient sitting up in bed, tracing maternal at this time. Patient repositioned into right tilt per preference. 0869: Octavio Fernández notified of scant bleeding status via telephone. 36: RN at bedside, patient repositioned into lateral left due to EFM tracing.  0615: patient sleeping, no signs of distress present, will continue to monitor. 0710: SBAR report given to EVA Rowland RN, transfer of patient care complete at this time.

## 2018-03-14 NOTE — PROGRESS NOTES
MFM - Please see the Ultrasound report / consult note to be entered into this patient's record as a scanned document from my office. A text copy of this note is also provided below for convenience. Derek Frias M.D., Ph.D.  Jemma Swift    ---------------------------------------------------------------------    Indication: Antepartum hemorrhage, unspecified, third trimester O46.93.   ____________________________________________________________________________  History: Age: 32 years. : 3 Para: 2. Current Pregnancy: Blood group: A Rhesus D-positive. Pre- pregnancy data: Weight 171 lbs. Height 5 ft 2 ins. BMI 31.3. Obstetric History: Mode of last delivery: Vaginal Delivery. ____________________________________________________________________________  Dating:  LMP: 07/10/17 EDC: 18 GA by LMP: 35w2d  Earlier Assessment on: 17 EDC: 18 GA by earlier assessment: 35w2d  Current Scan on: 18 EDC: 18 GA by current scan: 35w5d  Best Overall Assessment: 18 EDC: 18 Assessed GA: 35w2d  The calculation of the gestational age by current scan was based on BPD, HC, AC and FL. The Best Overall Assessment is based on the LMP.  ____________________________________________________________________________  General Evaluation:  Fetal heart activity: present. Fetal heart rate: 135 bpm.   Presentation: cephalic. Fetal movement: visible. Amniotic Fluid: normal. MAGALY  16.7 cm. Maximal vertical pocket 4.5 cm. Cord: 3 vessels. Fetal cord insertion site: Not visible. Placenta: posterior. ____________________________________________________________________________  Anatomy Scan:  Sweeney gestation. Biometry:  Fetal Measurements used for the estimation of the gestational age are bolded.   BPD 88.5 mm 68th% 35w5d (34w5d to 36w6d)  .5 mm 53rd% 36w5d (34w0d to 39w3d)  .6 mm 43rd% 34w6d (33w6d to 35w6d)  FL 69.2 mm 50th% 35w4d (32w4d to 38w3d)  .1 mm 71st% 38w4d  HUM 59.6 mm 50th% 35w2d  VENTRp 5.7 mm n/a  EFW (lbs/oz) 5 lbs 13 ozs  EFW (g) 2643 g  46th%     Nasal bone: present. Fetal Anatomy:   Normal Abnormal Suboptimal Normal Abnormal Suboptimal  Head X o o Abdominal Wall X o o  Brain X o o GI- Tract o o o  Face See Details o o Kidneys X o o  Spine o o o Bladder X o o  Neck/Skin X o o Extremities See Details o o  Thorax X o o Skeleton X o o  Heart See Details o o Genitalia X MALE o o    Details: Brain: Visualized and normal appearance: brain parenchyma, cerebral ventricles, choroid plexus, Cisterna Magna, midline falx, cerebellum, cerebellar lobes, posterior fossa, cavum septi pellucidi. Face: eyes not visible, profile normal, nose normal, lip normal, palate not visible. Spine: appears normal  Thorax: Visualized and normal appearance: diaphragm. Heart: Visualized and normal appearance: cardiac location, four-chamber view, left outflow tract, right outflow tract, ventricles, great vessels. Cardiac axis: normal.  Gastrointestinal Tract: Normal stomach (location and size), liver, and bowel. Genitalia: male  fetus. Extremities: 4 limbs. Summary of Ultrasound Findings:  Transabdominal US. U/S machine: [employer] E8 Expert. U/S view: good. Impression: The evaluated fetal anatomy appears normal.    ____________________________________________________________________________  Fetal Wellbeing Assessment:  Amniotic fluid: normal. MAGALY: 16.7 cm. MVP: 4.5 cm. Q1: 4.2 cm. Q2: 4.5 cm. Q3: 3.5 cm. Q4: 4.5 cm. Biophysical Profile: Fetal body movements: normal (2), Fetal tone: normal (2), Fetal breathing movements: normal (2), Amniotic fluid volume: normal (2). Score 8 / 8. Summary: Reassuring fetal status. ____________________________________________________________________________  Maternal Structures:  Uterus: normal.  Cervix: normal.  Right Ovary: normal.   Right Ovary size: 24 mm x 24 mm x 15 mm. Volume: 4.5 ml.    Left Ovary: normal.   Left Ovary size: 38 mm x 22 mm x 19 mm. Volume: 8.3 ml. Cul de Sac / Pouch of [place]: no free fluid visible.  ____________________________________________________________________________  Report Summary:  Impression: A maternal and routine fetal anatomy evaluation was performed. A biophysical profile is performed to evaluate fetal well-being. Ms. Lane Cummings is hospitalized for observation following an episode of vaginal bleeding yesterday afternoon. She reports having lower abdominal cramping about 2 days before the bleeding which spontaneously resolved. Her bleeding was an unexpected gush of red blood while she moving in/out of her car passenger seat. She denies any associated trauma, injury, and intercourse. She denies any pain associated with the bleeding. She had limited contractions shortly after admission which have resolved spontaneously. She denies any clear fluid discharge from the vagina. She reports good fetal activity. Evaluation of labor shows 1 cm / long / -3 station -- no evidence of labor. Fetal status has been reassuring overnight. She has a history of two term vaginal deliveries. Of note, her laboratory assessment is shows WBC 21.7 K/uL (normal differential); Hgb is 12.4 g%. Single viable IUP with biometry measurements consistent with provided dating is observed. EFW is appropriate for gestational age. The visible maternal anatomy appears normal.  The placenta is posterior and fundal and appears normal with no evidence of placental abruption or other in utero hemorrhage. Her abdomen is soft and non-tender. The visible fetal anatomy appears normal as indicated above, but detailed fetal anatomy assessment was not completed (unless otherwise indicated). Normal fetal movements and amniotic fluid measurements are noted. Fetal assessment is reassuring. See BPP score above.      The limitations of ultrasound in making  diagnoses were reviewed with the patient at the time of today's evaluation, including the limited ability for ultrasound to diagnose placental abruption. Recommendations: 1. I do not recommend  steroids or any preparation for delivery at this time. 2.  With resolution of bleeding, normal maternal-fetal ultrasound evaluation, no evidence of labor or abruption, and reassuring fetal assessment, continued inpatient status does not appear to be necessary. 3.  Follow-up in one week for placental evaluation and  surveillance. Follow-up sooner if clinically indicated. 4.  If bleeding is resolved and re-evaluation is normal on a follow-up [facility] visit, then on-going maternal-fetal surveillance is probably not needed. 3- (RRF) - ADDENDED - Final report posted on this date.

## 2018-03-14 NOTE — ROUTINE PROCESS
0710: Bedside, Verbal and Written shift change report given to SUZY Flor (oncoming nurse) by Laurita Soliman, ENRIQUE (offgoing nurse). Report included the following information SBAR, Kardex, Intake/Output, MAR, Accordion and Recent Results. 1000: ARGELIA Lynn leaving pt bedside at this time. CNM discussing no change in plan of care at this time with RN. This RN to call CNM when pt returns from [facility] to discuss plan of care    1050: Pt verbalizing that vaginal bleeding has \"pretty much stopped. \"     1110: This RN on phone with [facility] to determine approximate time when pt can be seen. Pt info given to [facility]. [facility] will call back with time and info    1148: Pt on way to [facility] at this time via wheelchair with SUZY Amy Parada and spouse. IV saline locked. 1353: This RN updating Asiya Grazyna that pt is back from [facility] and stating that she was told by MFM MD that from their standpoint, she can go home. CNM requesting that this RN call her back when MFM note has been written because she cannot read notes from where she is.    1420: This RN discussing pt plan of care with OLIVER Matt at this time. CNM requesting that this RN read MFM recommendations to her at this time due to CNM inability to see notes from home. This RN reading MFM recommendation note to CNM at this time. [address] discharge orders at this time due to MFM recommendation, resolved bleeding, and reactive FHR tracing. Pt to keep appt with midwife tomorrow for follow up and to see midwife and MFM weekly per MFM recommendation. This RN to educate pt on vaginal bleeding, to call CNM if any concerns, fetal kick counts, and general 35 week education. 1442: This RN reviewing all discharge instructions with pt at this time. This RN reiterating to call MD or 911 if vaginal bleeding restarts. Pt stating that all her questions are answered and that she understands all education.  Pt aware of need to \"take it easy\" and do no heavy lifting    1447: Pt confirming with this RN that there was no vaginal bleeding when she used the restroom at 1445    1450: This RN removing IV access x 2 at this time so that no IV access is present    1458: This RN discharging pt at this time via wheelchair. This RN wheeling pt to 's car at hospital medical building entrance.

## 2018-03-14 NOTE — PROGRESS NOTES
OLIVER Progress Note     Patient: Raquel French MRN: [id number]  SSN: [id number]    YOB: 1990  Age: 32 y.o. Sex: female        Subjective:   Patient has been resting overnight. Continues to deny any current pain or discomfort. Reports she now feels normal.       Objective:   Blood pressure 108/50, pulse 85, temperature 97.6 °F (36.4 °C), resp. rate 16, height 5' 2\" (1.575 m), weight 177 lb 5 oz (80.4 kg), last menstrual period 07/10/2017, SpO2 96 %, currently breastfeeding. Fetal heart baseline , minimal or moderate or marked variability, positive or negative accelerations, positive or negative decelerations, Uterine contractions q minutes, mild or moderate or strong to palpation, resting tone soft, Sterile Vaginal Exam: cm dilated/ % effaced/ station, cervix anterior or midline or posterior , fetal presentation vertex, membranes intact or ruptured     Scant amount of blood on toilet tissue with last void.      Assessment:     35w2d  Category 1 fetal heart rate tracing   Vaginal bleeding in third trimester      Plan:   Continue current orders/management   M consult in AM.       Braydon Young CNM

## 2018-03-14 NOTE — PROGRESS NOTES
[phone] returned from [facility] via wheelchair, back to bed. EFm applied. Per Pt Dr Daryle Monte said she can go vik. Explained to pt as soon as receive report will let her know.  And will call [facility] is do not receive report

## 2018-03-15 ENCOUNTER — HOSPITAL ENCOUNTER (EMERGENCY)
Age: 28
Discharge: HOME | End: 2018-03-15
Attending: ADVANCED PRACTICE MIDWIFE | Admitting: ADVANCED PRACTICE MIDWIFE
Payer: MEDICAID

## 2018-03-15 ENCOUNTER — ROUTINE PRENATAL (OUTPATIENT)
Dept: MIDWIFE SERVICES | Age: 28
End: 2018-03-15

## 2018-03-15 ENCOUNTER — TELEPHONE (OUTPATIENT)
Dept: OBGYN CLINIC | Age: 28
End: 2018-03-15

## 2018-03-15 VITALS
HEART RATE: 120 BPM | DIASTOLIC BLOOD PRESSURE: 80 MMHG | WEIGHT: 178 LBS | SYSTOLIC BLOOD PRESSURE: 120 MMHG | HEIGHT: 62 IN | BODY MASS INDEX: 32.76 KG/M2

## 2018-03-15 VITALS
TEMPERATURE: 98.3 F | DIASTOLIC BLOOD PRESSURE: 83 MMHG | RESPIRATION RATE: 14 BRPM | SYSTOLIC BLOOD PRESSURE: 113 MMHG | HEART RATE: 84 BPM

## 2018-03-15 DIAGNOSIS — O46.93 VAGINAL BLEEDING IN PREGNANCY, THIRD TRIMESTER: Primary | ICD-10-CM

## 2018-03-15 DIAGNOSIS — Z3A.35 35 WEEKS GESTATION OF PREGNANCY: ICD-10-CM

## 2018-03-15 LAB
ABO + RH BLD: NORMAL
ABO + RH BLD: NORMAL
BASOPHILS # BLD: 0.1 K/UL (ref 0–0.1)
BASOPHILS NFR BLD: 0 % (ref 0–1)
BLOOD GROUP ANTIBODIES SERPL: NORMAL
BLOOD GROUP ANTIBODIES SERPL: NORMAL
DIFFERENTIAL METHOD BLD: ABNORMAL
EOSINOPHIL # BLD: 0.3 K/UL (ref 0–0.4)
EOSINOPHIL NFR BLD: 2 % (ref 0–7)
ERYTHROCYTE [DISTWIDTH] IN BLOOD BY AUTOMATED COUNT: 14 % (ref 11.5–14.5)
HCT VFR BLD AUTO: 35.3 % (ref 35–47)
HGB BLD-MCNC: 12.1 G/DL (ref 11.5–16)
IMM GRANULOCYTES # BLD: 0.3 K/UL (ref 0–0.04)
IMM GRANULOCYTES NFR BLD AUTO: 2 % (ref 0–0.5)
LYMPHOCYTES # BLD: 2.9 K/UL (ref 0.8–3.5)
LYMPHOCYTES NFR BLD: 17 % (ref 12–49)
MCH RBC QN AUTO: 33.3 PG (ref 26–34)
MCHC RBC AUTO-ENTMCNC: 34.3 G/DL (ref 30–36.5)
MCV RBC AUTO: 97.2 FL (ref 80–99)
MONOCYTES # BLD: 1.3 K/UL (ref 0–1)
MONOCYTES NFR BLD: 8 % (ref 5–13)
NEUTS SEG # BLD: 12.1 K/UL (ref 1.8–8)
NEUTS SEG NFR BLD: 72 % (ref 32–75)
NRBC # BLD: 0 K/UL (ref 0–0.01)
NRBC BLD-RTO: 0 PER 100 WBC
PLATELET # BLD AUTO: 304 K/UL (ref 150–400)
PMV BLD AUTO: 10.7 FL (ref 8.9–12.9)
RBC # BLD AUTO: 3.63 M/UL (ref 3.8–5.2)
SPECIMEN EXP DATE BLD: NORMAL
SPECIMEN EXP DATE BLD: NORMAL
WBC # BLD AUTO: 16.9 K/UL (ref 3.6–11)

## 2018-03-15 PROCEDURE — 59025 FETAL NON-STRESS TEST: CPT

## 2018-03-15 PROCEDURE — 87081 CULTURE SCREEN ONLY: CPT | Performed by: ADVANCED PRACTICE MIDWIFE

## 2018-03-15 PROCEDURE — 99285 EMERGENCY DEPT VISIT HI MDM: CPT

## 2018-03-15 PROCEDURE — 96360 HYDRATION IV INFUSION INIT: CPT

## 2018-03-15 PROCEDURE — 86900 BLOOD TYPING SEROLOGIC ABO: CPT | Performed by: ADVANCED PRACTICE MIDWIFE

## 2018-03-15 PROCEDURE — 87147 CULTURE TYPE IMMUNOLOGIC: CPT | Performed by: ADVANCED PRACTICE MIDWIFE

## 2018-03-15 PROCEDURE — 36415 COLL VENOUS BLD VENIPUNCTURE: CPT | Performed by: ADVANCED PRACTICE MIDWIFE

## 2018-03-15 PROCEDURE — 85025 COMPLETE CBC W/AUTO DIFF WBC: CPT | Performed by: ADVANCED PRACTICE MIDWIFE

## 2018-03-15 NOTE — PROGRESS NOTES
S: Feeling well. No ctxs, LOF. This past Tuesday, Evelia Alva experienced a large gush of blood accompanied by lower back pain and abdominal cramps and was seen in L&D overnight. After a MFM consult was unable to determine a cause of the bleeding, Evelia Alva was discharged home with a follow up scheduled. Today, she noticed red blood when she wipes. When wearing a pad a small amount collects on the pad. She denies abdominal cramping, but does report lower back pain. Endorses good fetal movement. O: See prenatal flowsheet for maternal and fetal exam  Blood pressure 120/80, pulse (!) 120, height 5' 2\" (1.575 m), weight 178 lb (80.7 kg), last menstrual period 07/10/2017  NST 40 min. FHR baseline 145 bpm, moderate variability, episodic variable x 1, lambert to 130 x 70 sec., neg accels - nonreactive. Cat II tracing    A:  35w3d  Vaginal bleeding, unknown source    P:   Consult with Dr. Ethan Espino on NST tracing and vaginal bleeding. Admit to L&D outpatient for prolonged / continued monitoring. Reevaluate in 2 hours. CBC, sample to blood bank, and GBS culture. Talked with client, all questions answered. Client in agreement with POC.   See prenatal checklist for other topics covered during visit today  RTO in 1 weeks for CHRISTEN with OLIVER and MD

## 2018-03-15 NOTE — IP AVS SNAPSHOT
[address] 
[phone] Patient: Rashad Shay MRN: [id number] YPZ:[id number] About your hospitalization You were admitted on:  N/A You last received care in the:  [facility] LABOR & DELIVERY You were discharged on:  March 15, 2018 Why you were hospitalized Your primary diagnosis was:  Not on File Follow-up Information None Discharge Orders None A check bernabe indicates which time of day the medication should be taken. My Medications ASK your doctor about these medications Instructions Each Dose to Equal  
 Morning Noon Evening Bedtime PRENATAL PO Your last dose was: Your next dose is: Take  by mouth. PROBIOTIC PO Your last dose was: Your next dose is: Take  by mouth. Discharge Instructions Vaginal Bleeding During Pregnancy: Care Instructions Your Care Instructions Many women have some vaginal bleeding when they are pregnant. In some cases, the bleeding is not serious. And there aren't any more problems with the pregnancy. But sometimes bleeding is a sign of a more serious problem. This is more common if the bleeding is heavy or painful. Examples of more serious problems include miscarriage, an ectopic pregnancy, or a problem with the placenta. You may have to see your doctor again to be sure everything is okay. You may also need more tests to find the cause of the bleeding. For some women, home treatment is all they need. But it depends on what is causing the bleeding. Be sure to tell your doctor if you have any new symptoms or if your symptoms get worse. The doctor has checked you carefully, but problems can develop later. If you notice any problems or new symptoms, get medical treatment right away. Follow-up care is a key part of your treatment and safety. Be sure to make and go to all appointments, and call your doctor if you are having problems. It's also a good idea to know your test results and keep a list of the medicines you take. How can you care for yourself at home? · If your doctor prescribed medicines, take them exactly as directed. Call your doctor if you think you are having a problem with your medicine. · Do not have sex until the bleeding stops and your doctor says it's okay. · Ask your doctor about other activities you can or can't do. · Get a lot of rest. Being pregnant can make you tired. · Eat a healthy diet. Include a lot of peas, beans, and leafy green vegetables. Talk to a dietitian if you need help planning your diet. · Do not use nonsteroidal anti-inflammatory drugs (NSAIDs), such as ibuprofen (Advil, Motrin), naproxen (Aleve), or aspirin, unless your doctor says it is okay. When should you call for help? Call 911 anytime you think you may need emergency care. For example, call if: 
? · You passed out (lost consciousness). ? · You have severe vaginal bleeding. ?Call your doctor now or seek immediate medical care if: 
? · You have any vaginal bleeding. ? · You have pain in your belly or pelvis. ? · You think that you are in labor. ? · You have a sudden release of fluid from your vagina. ? · You notice that your baby has stopped moving or is moving much less than normal. ? Watch closely for changes in your health, and be sure to contact your doctor if you have any questions or concerns. Where can you learn more? Go to http://henrique-cesar.info/. Enter W282 in the search box to learn more about \"Vaginal Bleeding During Pregnancy: Care Instructions. \" Current as of: March 16, 2017 Content Version: 11.4 © 0235-4819 [employer].  Care instructions adapted under license by [employer] (which disclaims liability or warranty for this information). If you have questions about a medical condition or this instruction, always ask your healthcare professional. [address] any warranty or liability for your use of this information. Pregnancy Precautions: Care Instructions Your Care Instructions There is no sure way to prevent labor before your due date ( labor) or to prevent most other pregnancy problems. But there are things you can do to increase your chances of a healthy pregnancy. Go to your appointments, follow your doctor's advice, and take good care of yourself. Eat well, and exercise (if your doctor agrees). And make sure to drink plenty of water. Follow-up care is a key part of your treatment and safety. Be sure to make and go to all appointments, and call your doctor if you are having problems. It's also a good idea to know your test results and keep a list of the medicines you take. How can you care for yourself at home? · Make sure you go to your prenatal appointments. At each visit, your doctor will check your blood pressure. Your doctor will also check to see if you have protein in your urine. High blood pressure and protein in urine are signs of preeclampsia. This condition can be dangerous for you and your baby. · Drink plenty of fluids, enough so that your urine is light yellow or clear like water. Dehydration can cause contractions. If you have kidney, heart, or liver disease and have to limit fluids, talk with your doctor before you increase the amount of fluids you drink. · Tell your doctor right away if you notice any symptoms of an infection, such as: ¨ Burning when you urinate. ¨ A foul-smelling discharge from your vagina. ¨ Vaginal itching. ¨ Unexplained fever. ¨ Unusual pain or soreness in your uterus or lower belly. · Eat a balanced diet. Include plenty of foods that are high in calcium and iron.  
¨ Foods high in calcium include milk, cheese, yogurt, almonds, and broccoli. ¨ Foods high in iron include red meat, shellfish, poultry, eggs, beans, raisins, whole-grain bread, and leafy green vegetables. · Do not smoke. If you need help quitting, talk to your doctor about stop-smoking programs and medicines. These can increase your chances of quitting for good. · Do not drink alcohol or use illegal drugs. · Follow your doctor's directions about activity. Your doctor will let you know how much, if any, exercise you can do. · Ask your doctor if you can have sex. If you are at risk for early labor, your doctor may ask you to not have sex. · Take care to prevent falls. During pregnancy, your joints are loose, and your balance is off. Sports such as bicycling, skiing, or in-line skating can increase your risk of falling. And don't ride horses or motorcycles, dive, water ski, scuba dive, or parachute jump while you are pregnant. · Avoid getting very hot. Do not use saunas or hot tubs. Avoid staying out in the sun in hot weather for long periods. Take acetaminophen (Tylenol) to lower a high fever. · Do not take any over-the-counter or herbal medicines or supplements without talking to your doctor or pharmacist first. 
When should you call for help? Call 911 anytime you think you may need emergency care. For example, call if: 
? · You passed out (lost consciousness). ? · You have severe vaginal bleeding. ? · You have severe pain in your belly or pelvis. ? · You have had fluid gushing or leaking from your vagina and you know or think the umbilical cord is bulging into your vagina. If this happens, immediately get down on your knees so your rear end (buttocks) is higher than your head. This will decrease the pressure on the cord until help arrives. ?Call your doctor now or seek immediate medical care if: 
? · You have signs of preeclampsia, such as: 
¨ Sudden swelling of your face, hands, or feet. ¨ New vision problems (such as dimness or blurring). ¨ A severe headache. ? · You have any vaginal bleeding. ? · You have belly pain or cramping. ? · You have a fever. ? · You have had regular contractions (with or without pain) for an hour. This means that you have 8 or more within 1 hour or 4 or more in 20 minutes after you change your position and drink fluids. ? · You have a sudden release of fluid from your vagina. ? · You have low back pain or pelvic pressure that does not go away. ? · You notice that your baby has stopped moving or is moving much less than normal. ? Watch closely for changes in your health, and be sure to contact your doctor if you have any problems. Where can you learn more? Go to http://henriqueInofilecesar.info/. Enter 8154-2471076 in the search box to learn more about \"Pregnancy Precautions: Care Instructions. \" Current as of: March 16, 2017 Content Version: 11.4 © 4804-2992 [employer]. Care instructions adapted under license by [employer] (which disclaims liability or warranty for this information). If you have questions about a medical condition or this instruction, always ask your healthcare professional. [place] [address] any warranty or liability for your use of this information. Counting Your Baby's Kicks: Care Instructions Your Care Instructions Counting your baby's kicks is one way your doctor can tell that your baby is healthy. Most women-especially in a first pregnancy-feel their baby move for the first time between 16 and 22 weeks. The movement may feel like flutters rather than kicks. Your baby may move more at certain times of the day. When you are active, you may notice less kicking than when you are resting. At your prenatal visits, your doctor will ask whether the baby is active. In your last trimester, your doctor may ask you to count the number of times you feel your baby move. Follow-up care is a key part of your treatment and safety.  Be sure to make and go to all appointments, and call your doctor if you are having problems. It's also a good idea to know your test results and keep a list of the medicines you take. How do you count fetal kicks? · A common method of checking your baby's movement is to count the number of kicks or moves you feel in 1 hour. Ten movements (such as kicks, flutters, or rolls) in 1 hour are normal. Some doctors suggest that you count in the morning until you get to 10 movements. Then you can quit for that day and start again the next day. · Pick your baby's most active time of day to count. This may be any time from morning to evening. · If you do not feel 10 movements in an hour, your baby may be sleeping. Wait for the next hour and count again. When should you call for help? Call your doctor now or seek immediate medical care if: 
? · You noticed that your baby has stopped moving or is moving much less than normal. ? Watch closely for changes in your health, and be sure to contact your doctor if you have any problems. Where can you learn more? Go to http://henriqueLonocesar.info/. Enter G639 in the search box to learn more about \"Counting Your Baby's Kicks: Care Instructions. \" Current as of: March 16, 2017 Content Version: 11.4 © 2213-1712 [employer]. Care instructions adapted under license by [employer] (which disclaims liability or warranty for this information). If you have questions about a medical condition or this instruction, always ask your healthcare professional. [place] [address] any warranty or liability for your use of this information. Introducing [facility]! Victoriano Herrera introduces [employer] patient portal. Now you can access parts of your medical record, email your doctor's office, and request medication refills online. 1. In your internet browser, go to [email or web address] [email or web address] 2. Click on the First Time User? Click Here link in the Sign In box. You will see the New Member Sign Up page. 3. Enter your [facility] Access Code exactly as it appears below. You will not need to use this code after youve completed the sign-up process. If you do not sign up before the expiration date, you must request a new code. · [facility] Access Code: [email or web address] Expires: 4/15/2018 10:57 AM 
 
4. Enter the last four digits of your Social Security Number (xxxx) and Date of Birth (mm/dd/yyyy) as indicated and click Submit. You will be taken to the next sign-up page. 5. Create a [facility] ID. This will be your [facility] login ID and cannot be changed, so think of one that is secure and easy to remember. 6. Create a [facility] password. You can change your password at any time. 7. Enter your Password Reset Question and Answer. This can be used at a later time if you forget your password. 8. Enter your e-mail address. You will receive e-mail notification when new information is available in [address] 9. Click Sign Up. You can now view and download portions of your medical record. 10. Click the Download Summary menu link to download a portable copy of your medical information. If you have questions, please visit the Frequently Asked Questions section of the [facility] website. Remember, [facility] is NOT to be used for urgent needs. For medical emergencies, dial 911. Now available from your iPhone and Android! Unresulted Labs-Please follow up with your PCP about these lab tests Order Current Status CULTURE, GENITAL GROUP B STREP In process Providers Seen During Your Hospitalization Provider Specialty Primary office phone Pravin Brumfield CNM Certified Nurse Midwife [phone] Your Primary Care Physician (PCP) Primary Care Physician Office Phone Office Fax NONE ** None ** ** None ** You are allergic to the following Allergen Reactions Bee Sting (Sting, Bee) Swelling Recent Documentation OB Status Smoking Status Pregnant Former Smoker Emergency Contacts Name Discharge Info Relation Home Work Mobile Chadwick Begum 27 CAREGIVER [3] Boyfriend [17]   [phone] [address]  Other Relative [6] [phone] Patient Belongings The following personal items are in your possession at time of discharge: 
  Dental Appliances: None         Home Medications: None   Jewelry: Necklace  Clothing: At bedside, Shirt    Other Valuables: Sean, [address] [place] [address], [facility], [place] Please provide this summary of care documentation to your next provider. Signatures-by signing, you are acknowledging that this After Visit Summary has been reviewed with you and you have received a copy. Patient Signature:  ____________________________________________________________ Date:  ____________________________________________________________  
  
Kendy Avelar Provider Signature:  ____________________________________________________________ Date:  ____________________________________________________________

## 2018-03-15 NOTE — IP AVS SNAPSHOT
Summary of Care Report The Summary of Care report has been created to help improve care coordination. Users with access to [employer] or [address] (Web-based application) may access additional patient information including the Discharge Summary. If you are not currently a [address] user and need more information, please call the number listed below in the [place] [address] section and ask to be connected with Medical Records. Facility Information Name Address Phone [address] [place] [id number] [phone] Patient Information Patient Name Sex  Stacy Camejo ([id number]) Female 1990 Discharge Information Admitting Provider Service Area Unit Dora Coleman CNM / [phone] [address] 2 Labor & Delivery / [phone] Discharge Provider Discharge Date/Time Discharge Disposition Destination (none) (none) (none) (none) Patient Language Language ENGLISH [13] Hospital Problems as of 3/15/2018  Reviewed: 3/15/2018  3:33 PM by Dora Coleman CNM None Non-Hospital Problems as of 3/15/2018  Reviewed: 3/15/2018  3:33 PM by Dora Coleman CNM Class Noted - Resolved Last Modified Active Problems Normal pregnancy in multigravida  2017 - Present 3/1/2018 by Dora Coleman CNM Entered by Charlene Salamanca CNM Overview Signed 3/1/2018  3:46 PM by Dora Coleman CNM Baby Boy: Mouna Perrin Pregnancy  3/13/2018 - Present 3/13/2018 by Dora Coleman CNM Entered by Dora Coleman CNM You are allergic to the following Allergen Reactions Bee Sting (Sting, Bee) Swelling Current Discharge Medication List  
  
ASK your doctor about these medications Dose & Instructions Dispensing Information Comments  PRENATAL PO  
 Take  by mouth. Refills:  0 PROBIOTIC PO Take  by mouth. Refills:  0 Current Immunizations Name Date Tdap 2/15/2018 Follow-up Information None Discharge Instructions Vaginal Bleeding During Pregnancy: Care Instructions Your Care Instructions Many women have some vaginal bleeding when they are pregnant. In some cases, the bleeding is not serious. And there aren't any more problems with the pregnancy. But sometimes bleeding is a sign of a more serious problem. This is more common if the bleeding is heavy or painful. Examples of more serious problems include miscarriage, an ectopic pregnancy, or a problem with the placenta. You may have to see your doctor again to be sure everything is okay. You may also need more tests to find the cause of the bleeding. For some women, home treatment is all they need. But it depends on what is causing the bleeding. Be sure to tell your doctor if you have any new symptoms or if your symptoms get worse. The doctor has checked you carefully, but problems can develop later. If you notice any problems or new symptoms, get medical treatment right away. Follow-up care is a key part of your treatment and safety. Be sure to make and go to all appointments, and call your doctor if you are having problems. It's also a good idea to know your test results and keep a list of the medicines you take. How can you care for yourself at home? · If your doctor prescribed medicines, take them exactly as directed. Call your doctor if you think you are having a problem with your medicine. · Do not have sex until the bleeding stops and your doctor says it's okay. · Ask your doctor about other activities you can or can't do. · Get a lot of rest. Being pregnant can make you tired. · Eat a healthy diet. Include a lot of peas, beans, and leafy green vegetables. Talk to a dietitian if you need help planning your diet. · Do not use nonsteroidal anti-inflammatory drugs (NSAIDs), such as ibuprofen (Advil, Motrin), naproxen (Aleve), or aspirin, unless your doctor says it is okay. When should you call for help? Call 911 anytime you think you may need emergency care. For example, call if: 
? · You passed out (lost consciousness). ? · You have severe vaginal bleeding. ?Call your doctor now or seek immediate medical care if: 
? · You have any vaginal bleeding. ? · You have pain in your belly or pelvis. ? · You think that you are in labor. ? · You have a sudden release of fluid from your vagina. ? · You notice that your baby has stopped moving or is moving much less than normal. ? Watch closely for changes in your health, and be sure to contact your doctor if you have any questions or concerns. Where can you learn more? Go to http://henrique-cesar.info/. Enter H476 in the search box to learn more about \"Vaginal Bleeding During Pregnancy: Care Instructions. \" Current as of: 2017 Content Version: 11.4 © 9666-4461 [employer]. Care instructions adapted under license by [employer] (which disclaims liability or warranty for this information). If you have questions about a medical condition or this instruction, always ask your healthcare professional. [place] [address] any warranty or liability for your use of this information. Pregnancy Precautions: Care Instructions Your Care Instructions There is no sure way to prevent labor before your due date ( labor) or to prevent most other pregnancy problems. But there are things you can do to increase your chances of a healthy pregnancy. Go to your appointments, follow your doctor's advice, and take good care of yourself. Eat well, and exercise (if your doctor agrees). And make sure to drink plenty of water. Follow-up care is a key part of your treatment and safety.  Be sure to make and go to all appointments, and call your doctor if you are having problems. It's also a good idea to know your test results and keep a list of the medicines you take. How can you care for yourself at home? · Make sure you go to your prenatal appointments. At each visit, your doctor will check your blood pressure. Your doctor will also check to see if you have protein in your urine. High blood pressure and protein in urine are signs of preeclampsia. This condition can be dangerous for you and your baby. · Drink plenty of fluids, enough so that your urine is light yellow or clear like water. Dehydration can cause contractions. If you have kidney, heart, or liver disease and have to limit fluids, talk with your doctor before you increase the amount of fluids you drink. · Tell your doctor right away if you notice any symptoms of an infection, such as: ¨ Burning when you urinate. ¨ A foul-smelling discharge from your vagina. ¨ Vaginal itching. ¨ Unexplained fever. ¨ Unusual pain or soreness in your uterus or lower belly. · Eat a balanced diet. Include plenty of foods that are high in calcium and iron. ¨ Foods high in calcium include milk, cheese, yogurt, almonds, and broccoli. ¨ Foods high in iron include red meat, shellfish, poultry, eggs, beans, raisins, whole-grain bread, and leafy green vegetables. · Do not smoke. If you need help quitting, talk to your doctor about stop-smoking programs and medicines. These can increase your chances of quitting for good. · Do not drink alcohol or use illegal drugs. · Follow your doctor's directions about activity. Your doctor will let you know how much, if any, exercise you can do. · Ask your doctor if you can have sex. If you are at risk for early labor, your doctor may ask you to not have sex. · Take care to prevent falls. During pregnancy, your joints are loose, and your balance is off.  Sports such as bicycling, skiing, or in-line skating can increase your risk of falling. And don't ride horses or motorcycles, dive, water ski, scuba dive, or parachute jump while you are pregnant. · Avoid getting very hot. Do not use saunas or hot tubs. Avoid staying out in the sun in hot weather for long periods. Take acetaminophen (Tylenol) to lower a high fever. · Do not take any over-the-counter or herbal medicines or supplements without talking to your doctor or pharmacist first. 
When should you call for help? Call 911 anytime you think you may need emergency care. For example, call if: 
? · You passed out (lost consciousness). ? · You have severe vaginal bleeding. ? · You have severe pain in your belly or pelvis. ? · You have had fluid gushing or leaking from your vagina and you know or think the umbilical cord is bulging into your vagina. If this happens, immediately get down on your knees so your rear end (buttocks) is higher than your head. This will decrease the pressure on the cord until help arrives. ?Call your doctor now or seek immediate medical care if: 
? · You have signs of preeclampsia, such as: 
¨ Sudden swelling of your face, hands, or feet. ¨ New vision problems (such as dimness or blurring). ¨ A severe headache. ? · You have any vaginal bleeding. ? · You have belly pain or cramping. ? · You have a fever. ? · You have had regular contractions (with or without pain) for an hour. This means that you have 8 or more within 1 hour or 4 or more in 20 minutes after you change your position and drink fluids. ? · You have a sudden release of fluid from your vagina. ? · You have low back pain or pelvic pressure that does not go away. ? · You notice that your baby has stopped moving or is moving much less than normal. ? Watch closely for changes in your health, and be sure to contact your doctor if you have any problems. Where can you learn more? Go to http://henrique-cesar.info/. Enter [id number] in the search box to learn more about \"Pregnancy Precautions: Care Instructions. \" Current as of: March 16, 2017 Content Version: 11.4 © 8519-1142 [employer]. Care instructions adapted under license by [employer] (which disclaims liability or warranty for this information). If you have questions about a medical condition or this instruction, always ask your healthcare professional. [place] [address] any warranty or liability for your use of this information. Counting Your Baby's Kicks: Care Instructions Your Care Instructions Counting your baby's kicks is one way your doctor can tell that your baby is healthy. Most women-especially in a first pregnancy-feel their baby move for the first time between 16 and 22 weeks. The movement may feel like flutters rather than kicks. Your baby may move more at certain times of the day. When you are active, you may notice less kicking than when you are resting. At your prenatal visits, your doctor will ask whether the baby is active. In your last trimester, your doctor may ask you to count the number of times you feel your baby move. Follow-up care is a key part of your treatment and safety. Be sure to make and go to all appointments, and call your doctor if you are having problems. It's also a good idea to know your test results and keep a list of the medicines you take. How do you count fetal kicks? · A common method of checking your baby's movement is to count the number of kicks or moves you feel in 1 hour. Ten movements (such as kicks, flutters, or rolls) in 1 hour are normal. Some doctors suggest that you count in the morning until you get to 10 movements. Then you can quit for that day and start again the next day. · Pick your baby's most active time of day to count. This may be any time from morning to evening. · If you do not feel 10 movements in an hour, your baby may be sleeping. Wait for the next hour and count again. When should you call for help? Call your doctor now or seek immediate medical care if: 
? · You noticed that your baby has stopped moving or is moving much less than normal. ? Watch closely for changes in your health, and be sure to contact your doctor if you have any problems. Where can you learn more? Go to http://henrique-cesar.info/. Enter R455 in the search box to learn more about \"Counting Your Baby's Kicks: Care Instructions. \" Current as of: March 16, 2017 Content Version: 11.4 © 4753-0234 [employer]. Care instructions adapted under license by [employer] (which disclaims liability or warranty for this information). If you have questions about a medical condition or this instruction, always ask your healthcare professional. [address] any warranty or liability for your use of this information. Chart Review Routing History No Routing History on File

## 2018-03-15 NOTE — PROGRESS NOTES
No chief complaint on file. Visit Vitals    /80    Pulse (!) 120    Ht 5' 2\" (1.575 m)    Wt 178 lb (80.7 kg)    LMP 07/10/2017    BMI 32.56 kg/m2     1. Have you been to the ER, urgent care clinic since your last visit? Hospitalized since your last visit? No    2. Have you seen or consulted any other health care providers outside of the [address] [place] [address] since your last visit? Include any pap smears or colon screening.  No

## 2018-03-15 NOTE — IP AVS SNAPSHOT
Kayy Pena 
 
 
 [address] 
[phone] Patient: Laquita Burns MRN: [id number] MGY:[birth year] A check bernabe indicates which time of day the medication should be taken. My Medications ASK your doctor about these medications Instructions Each Dose to Equal  
 Morning Noon Evening Bedtime PRENATAL PO Your last dose was: Your next dose is: Take  by mouth. PROBIOTIC PO Your last dose was: Your next dose is: Take  by mouth.

## 2018-03-15 NOTE — TELEPHONE ENCOUNTER
Call received at 11:25am      32year old  35w6d pregnant patient last seen in the office on 3/1/18. Patient calling to say that she was in Labor and Delivery yesterday for placenta abruption. Patient has appointment today at 1:30PM but is having some red spotting and is wondering about being seen sooner. Patient denies ROM, contractions and report positive fetal movement. This nurse transferred the call to nurse Iram Richards as requested. Patient verbalized understanding.

## 2018-03-15 NOTE — MR AVS SNAPSHOT
[address] [facility] 510 1007 [facility] 
[phone] Patient: Sumeet Smith MRN: [id number] ECL:8/27/3992 Visit Information Date & Time Provider Department Dept. Phone Encounter #  
 3/15/2018  1:30 PM [facility] [address] [id number] Follow-up Instructions Return in about 1 week (around 3/22/2018) for For MFM and CHRISTEN. Follow-up and Disposition History 3/21/2018  4:00 PM  
OB VISIT with [facility] [address] [place] [address] [facility] [facility] [address] ([facility]) Appt Note: ret ob (mfm @ 2:15 pm) 6 [facility]. [facility] 510 1007 [facility]  
[phone]  
  
   
 [address] [place] [address]. [address] Upcoming Health Maintenance Date Due  
 PAP AKA CERVICAL CYTOLOGY 9/23/2009 Allergies as of 3/15/2018  Review Complete On: 3/15/2018 By: Cesario Cabrera CNM Severity Noted Reaction Type Reactions Bee Sting [Sting, Bee] High 09/27/2017    Swelling Current Immunizations  Never Reviewed Name Date Tdap 2/15/2018 Not reviewed this visit You Were Diagnosed With   
  
 Codes Comments Vaginal bleeding in pregnancy, third trimester    -  Primary ICD-10-CM: O46.93 
ICD-9-CM: 641.93   
 35 weeks gestation of pregnancy     ICD-10-CM: Z3A.35 
ICD-9-CM: V22.2 Vitals BP Pulse Height(growth percentile) Weight(growth percentile) LMP BMI  
 120/80 (!) 120 5' 2\" (1.575 m) 178 lb (80.7 kg) 07/10/2017 32.56 kg/m2 OB Status Smoking Status Pregnant Former Smoker BMI and BSA Data Body Mass Index Body Surface Area 32.56 kg/m 2 1.88 m 2 Preferred Pharmacy Pharmacy Name Phone [facility] [address] [place] [address], [address] [place] [address] [phone] Your Updated Medication List  
  
   
 This list is accurate as of 3/15/18  4:46 PM.  Always use your most recent med list.  
  
  
  
  
 PRENATAL PO Take  by mouth. PROBIOTIC PO Take  by mouth. We Performed the Following FETAL NON-STRESS TEST [38906 CPT(R)] Follow-up Instructions Return in about 1 week (around 3/22/2018) for For MFM and CHRISTEN. Introducing [facility]! [employer] introduces [employer] patient portal. Now you can access parts of your medical record, email your doctor's office, and request medication refills online. 1. In your internet browser, go to [email or web address] 2. Click on the First Time User? Click Here link in the Sign In box. You will see the New Member Sign Up page. 3. Enter your Animal[employer] Access Code exactly as it appears below. You will not need to use this code after youve completed the sign-up process. If you do not sign up before the expiration date, you must request a new code. · Animal[employer] Access Code: [email or web address] Expires: 4/15/2018 10:57 AM 
 
4. Enter the last four digits of your Social Security Number (xxxx) and Date of Birth (mm/dd/yyyy) as indicated and click Submit. You will be taken to the next sign-up page. 5. Create a Animal[employer] ID. This will be your [employer] login ID and cannot be changed, so think of one that is secure and easy to remember. 6. Create a [employer] password. You can change your password at any time. 7. Enter your Password Reset Question and Answer. This can be used at a later time if you forget your password. 8. Enter your e-mail address. You will receive e-mail notification when new information is available in [address] 9. Click Sign Up. You can now view and download portions of your medical record. 10. Click the Download Summary menu link to download a portable copy of your medical information.  
 
If you have questions, please visit the Frequently Asked Questions section of the [employer]. Remember, [employer] is NOT to be used for urgent needs. For medical emergencies, dial 911. Now available from your iPhone and Android! Please provide this summary of care documentation to your next provider. Your primary care clinician is listed as NONE. If you have any questions after today's visit, please call [phone].

## 2018-03-15 NOTE — DISCHARGE INSTRUCTIONS
Vaginal Bleeding During Pregnancy: Care Instructions  Your Care Instructions    Many women have some vaginal bleeding when they are pregnant. In some cases, the bleeding is not serious. And there aren't any more problems with the pregnancy. But sometimes bleeding is a sign of a more serious problem. This is more common if the bleeding is heavy or painful. Examples of more serious problems include miscarriage, an ectopic pregnancy, or a problem with the placenta. You may have to see your doctor again to be sure everything is okay. You may also need more tests to find the cause of the bleeding. For some women, home treatment is all they need. But it depends on what is causing the bleeding. Be sure to tell your doctor if you have any new symptoms or if your symptoms get worse. The doctor has checked you carefully, but problems can develop later. If you notice any problems or new symptoms, get medical treatment right away. Follow-up care is a key part of your treatment and safety. Be sure to make and go to all appointments, and call your doctor if you are having problems. It's also a good idea to know your test results and keep a list of the medicines you take. How can you care for yourself at home? · If your doctor prescribed medicines, take them exactly as directed. Call your doctor if you think you are having a problem with your medicine. · Do not have sex until the bleeding stops and your doctor says it's okay. · Ask your doctor about other activities you can or can't do. · Get a lot of rest. Being pregnant can make you tired. · Eat a healthy diet. Include a lot of peas, beans, and leafy green vegetables. Talk to a dietitian if you need help planning your diet. · Do not use nonsteroidal anti-inflammatory drugs (NSAIDs), such as ibuprofen (Advil, Motrin), naproxen (Aleve), or aspirin, unless your doctor says it is okay. When should you call for help?   Call 911 anytime you think you may need emergency care. For example, call if:  ? · You passed out (lost consciousness). ? · You have severe vaginal bleeding. ?Call your doctor now or seek immediate medical care if:  ? · You have any vaginal bleeding. ? · You have pain in your belly or pelvis. ? · You think that you are in labor. ? · You have a sudden release of fluid from your vagina. ? · You notice that your baby has stopped moving or is moving much less than normal.   ? Watch closely for changes in your health, and be sure to contact your doctor if you have any questions or concerns. Where can you learn more? Go to http://henrique-cesar.info/. Enter E055 in the search box to learn more about \"Vaginal Bleeding During Pregnancy: Care Instructions. \"  Current as of: 2017  Content Version: 11.4  © 7823-5544 [employer]. Care instructions adapted under license by [email or web address] (which disclaims liability or warranty for this information). If you have questions about a medical condition or this instruction, always ask your healthcare professional. [place] [address] any warranty or liability for your use of this information. Pregnancy Precautions: Care Instructions  Your Care Instructions    There is no sure way to prevent labor before your due date ( labor) or to prevent most other pregnancy problems. But there are things you can do to increase your chances of a healthy pregnancy. Go to your appointments, follow your doctor's advice, and take good care of yourself. Eat well, and exercise (if your doctor agrees). And make sure to drink plenty of water. Follow-up care is a key part of your treatment and safety. Be sure to make and go to all appointments, and call your doctor if you are having problems. It's also a good idea to know your test results and keep a list of the medicines you take. How can you care for yourself at home?   · Make sure you go to your prenatal appointments. At each visit, your doctor will check your blood pressure. Your doctor will also check to see if you have protein in your urine. High blood pressure and protein in urine are signs of preeclampsia. This condition can be dangerous for you and your baby. · Drink plenty of fluids, enough so that your urine is light yellow or clear like water. Dehydration can cause contractions. If you have kidney, heart, or liver disease and have to limit fluids, talk with your doctor before you increase the amount of fluids you drink. · Tell your doctor right away if you notice any symptoms of an infection, such as:  ¨ Burning when you urinate. ¨ A foul-smelling discharge from your vagina. ¨ Vaginal itching. ¨ Unexplained fever. ¨ Unusual pain or soreness in your uterus or lower belly. · Eat a balanced diet. Include plenty of foods that are high in calcium and iron. ¨ Foods high in calcium include milk, cheese, yogurt, almonds, and broccoli. ¨ Foods high in iron include red meat, shellfish, poultry, eggs, beans, raisins, whole-grain bread, and leafy green vegetables. · Do not smoke. If you need help quitting, talk to your doctor about stop-smoking programs and medicines. These can increase your chances of quitting for good. · Do not drink alcohol or use illegal drugs. · Follow your doctor's directions about activity. Your doctor will let you know how much, if any, exercise you can do. · Ask your doctor if you can have sex. If you are at risk for early labor, your doctor may ask you to not have sex. · Take care to prevent falls. During pregnancy, your joints are loose, and your balance is off. Sports such as bicycling, skiing, or in-line skating can increase your risk of falling. And don't ride horses or motorcycles, dive, water ski, scuba dive, or parachute jump while you are pregnant. · Avoid getting very hot. Do not use saunas or hot tubs. Avoid staying out in the sun in hot weather for long periods.  Take acetaminophen (Tylenol) to lower a high fever. · Do not take any over-the-counter or herbal medicines or supplements without talking to your doctor or pharmacist first.  When should you call for help? Call 911 anytime you think you may need emergency care. For example, call if:  ? · You passed out (lost consciousness). ? · You have severe vaginal bleeding. ? · You have severe pain in your belly or pelvis. ? · You have had fluid gushing or leaking from your vagina and you know or think the umbilical cord is bulging into your vagina. If this happens, immediately get down on your knees so your rear end (buttocks) is higher than your head. This will decrease the pressure on the cord until help arrives. ?Call your doctor now or seek immediate medical care if:  ? · You have signs of preeclampsia, such as:  ¨ Sudden swelling of your face, hands, or feet. ¨ New vision problems (such as dimness or blurring). ¨ A severe headache. ? · You have any vaginal bleeding. ? · You have belly pain or cramping. ? · You have a fever. ? · You have had regular contractions (with or without pain) for an hour. This means that you have 8 or more within 1 hour or 4 or more in 20 minutes after you change your position and drink fluids. ? · You have a sudden release of fluid from your vagina. ? · You have low back pain or pelvic pressure that does not go away. ? · You notice that your baby has stopped moving or is moving much less than normal.   ? Watch closely for changes in your health, and be sure to contact your doctor if you have any problems. Where can you learn more? Go to http://henrique-cesar.info/. Enter [id number] in the search box to learn more about \"Pregnancy Precautions: Care Instructions. \"  Current as of: March 16, 2017  Content Version: 11.4  © 9217-1228 [employer].  Care instructions adapted under license by [employer] (which disclaims liability or warranty for this information). If you have questions about a medical condition or this instruction, always ask your healthcare professional. [address] any warranty or liability for your use of this information. Counting Your Baby's Kicks: Care Instructions  Your Care Instructions    Counting your baby's kicks is one way your doctor can tell that your baby is healthy. Most women-especially in a first pregnancy-feel their baby move for the first time between 16 and 22 weeks. The movement may feel like flutters rather than kicks. Your baby may move more at certain times of the day. When you are active, you may notice less kicking than when you are resting. At your prenatal visits, your doctor will ask whether the baby is active. In your last trimester, your doctor may ask you to count the number of times you feel your baby move. Follow-up care is a key part of your treatment and safety. Be sure to make and go to all appointments, and call your doctor if you are having problems. It's also a good idea to know your test results and keep a list of the medicines you take. How do you count fetal kicks? · A common method of checking your baby's movement is to count the number of kicks or moves you feel in 1 hour. Ten movements (such as kicks, flutters, or rolls) in 1 hour are normal. Some doctors suggest that you count in the morning until you get to 10 movements. Then you can quit for that day and start again the next day. · Pick your baby's most active time of day to count. This may be any time from morning to evening. · If you do not feel 10 movements in an hour, your baby may be sleeping. Wait for the next hour and count again. When should you call for help? Call your doctor now or seek immediate medical care if:  ? · You noticed that your baby has stopped moving or is moving much less than normal.   ? Watch closely for changes in your health, and be sure to contact your doctor if you have any problems. Where can you learn more? Go to http://henrique-cesar.info/. Enter I697 in the search box to learn more about \"Counting Your Baby's Kicks: Care Instructions. \"  Current as of: March 16, 2017  Content Version: 11.4  © 8796-1000 Healthwise, [employer]. Care instructions adapted under license by [employer] (which disclaims liability or warranty for this information). If you have questions about a medical condition or this instruction, always ask your healthcare professional. [address] any warranty or liability for your use of this information.

## 2018-03-16 NOTE — DISCHARGE SUMMARY
Antepartum Discharge Summary     Name: Agustina Obrien MRN: [id number]  SSN: [id number]    YOB: 1990  Age: 32 y.o. Sex: female      Allergies: Bee sting [sting, bee]    Admit Date: 3/13/2018    Discharge Date: 3/14/2018     Admitting Physician: Gerri Pace CNM     Attending Physician:  Real George     * Admission Diagnoses: Pregnancy  Vaginal bleeding    * Discharge Diagnoses:   Hospital Problems as of 3/14/2018  Date Reviewed: 3/1/2018          Codes Class Noted - Resolved POA    Pregnancy ICD-10-CM: Z34.90  ICD-9-CM: V22.2  3/13/2018 - Present Unknown             Lab Results   Component Value Date/Time    ABO/Rh(D) A POSITIVE 03/15/2018 03:01 PM      Immunization History   Administered Date(s) Administered    Tdap 02/15/2018       * Discharge Condition: good,  Bleeding resolved    * Procedures:   * No surgery found *      [facility] Hospital Course:  Hollie Evans was admitted to L&D on 3/13 after she had a large gush of blood from her vagina. She had had some mild cramping. She denied any trauma to abdomen and recent intercourse. She was seen by MFM, US did not find the cause of bleeding. She had a normal MAGALY, and BPP 10/10. It was recommended that she be discharged with follow up in office on Thursday, and with MFM in 1 week. Category 1 FHT  VS WNL  -     * Disposition: Home    Discharge Medications:   Discharge Medication List as of 3/14/2018  2:29 PM          * Follow-up Care/Patient Instructions:   Activity: Activity as tolerated  Diet: Regular Diet  Wound Care: None needed    Follow-up Information     Follow up With Details Comments Contact Info    None   None ([phone]) Patient stated that they have no PCP      Gerri Pace CNM Go in 1 day Keep appt for tomorrow 3/15 for follow up [id number] [place]      Gerri Pace CNM Schedule an appointment as soon as possible for a visit in 1 week Make weekly appointments with midwife [address] [place] [id number] [address]  Schedule an appointment as soon as possible for a visit in 1 week Make appointment with [facility]/ center weekly [address] [facility]  [phone]           Signed By:  Eva Dasilav CNM     2018

## 2018-03-18 LAB
BACTERIA SPEC CULT: ABNORMAL
GRBS, EXTERNAL: POSITIVE
SERVICE CMNT-IMP: ABNORMAL

## 2018-03-21 ENCOUNTER — ROUTINE PRENATAL (OUTPATIENT)
Dept: MIDWIFE SERVICES | Age: 28
End: 2018-03-21

## 2018-03-21 ENCOUNTER — HOSPITAL ENCOUNTER (OUTPATIENT)
Dept: PERINATAL CARE | Age: 28
Discharge: HOME | End: 2018-03-21
Attending: OBSTETRICS & GYNECOLOGY
Payer: MEDICAID

## 2018-03-21 VITALS
WEIGHT: 177.31 LBS | DIASTOLIC BLOOD PRESSURE: 72 MMHG | BODY MASS INDEX: 32.63 KG/M2 | HEART RATE: 97 BPM | SYSTOLIC BLOOD PRESSURE: 119 MMHG | HEIGHT: 62 IN

## 2018-03-21 DIAGNOSIS — Z3A.35 35 WEEKS GESTATION OF PREGNANCY: ICD-10-CM

## 2018-03-21 DIAGNOSIS — O46.90 VAGINAL BLEEDING DURING PREGNANCY, ANTEPARTUM: Primary | ICD-10-CM

## 2018-03-21 DIAGNOSIS — O46.93 VAGINAL BLEEDING IN PREGNANCY, THIRD TRIMESTER: Primary | ICD-10-CM

## 2018-03-21 DIAGNOSIS — B95.1 POSITIVE GBS TEST: ICD-10-CM

## 2018-03-21 PROCEDURE — 76818 FETAL BIOPHYS PROFILE W/NST: CPT | Performed by: OBSTETRICS & GYNECOLOGY

## 2018-03-21 NOTE — PATIENT INSTRUCTIONS
Thank you for choosing [facility]. We know you have many options when it comes to your healthcare; we appreciate that you picked us. Our goal is to provide exceptional service and world class care for every patient. You may receive a survey in the mail or by email asking for your feedback. Please take a few minutes to share your thoughts about your recent visit. Your comments helps us understand what we do well and what we can do better. To ensure confidentiality, this survey is administered by an independent third-party, [address] [place] [address] participation will help us to continue and improve the quality of care that we provide to you, your family, friends, and neighbors. Thank you! Weeks 34 to 36 of Your Pregnancy: Care Instructions  Your Care Instructions    By now, your baby and your belly have grown quite large. It is almost time to give birth. A full-term pregnancy can deliver between 37 and 42 weeks. Your baby's lungs are almost ready to breathe air. The bones in your baby's head are now firm enough to protect it, but soft enough to move down through the birth canal.  You may feel excited, happy, anxious, or scared. You may wonder how you will know if you are in labor or what to expect during labor. Try to be flexible in your expectations of the birth. Because each birth is different, there is no way to know exactly what childbirth will be like for you. This care sheet will help you know what to expect and how to prepare. This may make your childbirth easier. If you haven't already had the Tdap shot during this pregnancy, talk to your doctor about getting it. It will help protect your  against pertussis infection. In the 36th week, most women have a test for group B streptococcus (GBS). GBS is a common bacteria that can live in the vagina and rectum. It can make your baby sick after birth. If you test positive, you will get antibiotics during labor.  The medicine will keep your baby from getting the bacteria. Follow-up care is a key part of your treatment and safety. Be sure to make and go to all appointments, and call your doctor if you are having problems. It's also a good idea to know your test results and keep a list of the medicines you take. How can you care for yourself at home? Learn about pain relief choices  · Pain is different for every woman. Talk with your doctor about your feelings about pain. · You can choose from several types of pain relief. These include medicine or breathing techniques, as well as comfort measures. You can use more than one option. · If you choose to have pain medicine during labor, talk to your doctor about your options. Some medicines lower anxiety and help with some of the pain. Others make your lower body numb so that you won't feel pain. · Be sure to tell your doctor about your pain medicine choice before you start labor or very early in your labor. You may be able to change your mind as labor progresses. · Rarely, a woman is put to sleep by medicine given through a mask or an IV. Labor and delivery  · The first stage of labor has three parts: early, active, and transition. ¨ Most women have early labor at home. You can stay busy or rest, eat light snacks, drink clear fluids, and start counting contractions. ¨ When talking during a contraction gets hard, you may be moving to active labor. During active labor, you should head for the hospital if you are not there already. ¨ You are in active labor when contractions come every 3 to 4 minutes and last about 60 seconds. Your cervix is opening more rapidly. ¨ If your water breaks, contractions will come faster and stronger. ¨ During transition, your cervix is stretching, and contractions are coming more rapidly. ¨ You may want to push, but your cervix might not be ready. Your doctor will tell you when to push.   · The second stage starts when your cervix is completely opened and you are ready to push. ¨ Contractions are very strong to push the baby down the birth canal.  ¨ You will feel the urge to push. You may feel like you need to have a bowel movement. ¨ You may be coached to push with contractions. These contractions will be very strong, but you will not have them as often. You can get a little rest between contractions. ¨ You may be emotional and irritable. You may not be aware of what is going on around you. ¨ One last push, and your baby is born. · The third stage is when a few more contractions push out the placenta. This may take 30 minutes or less. · The fourth stage is the welcome recovery. You may feel overwhelmed with emotions and exhausted but alert. This is a good time to start breastfeeding. Where can you learn more? Go to http://henrique-cesar.info/. Enter Q113 in the search box to learn more about \"Weeks 34 to 36 of Your Pregnancy: Care Instructions. \"  Current as of: March 16, 2017  Content Version: 11.4  © 5469-8065 Healthwise[employer]. Care instructions adapted under license by [employer] (which disclaims liability or warranty for this information). If you have questions about a medical condition or this instruction, always ask your healthcare professional. [address] any warranty or liability for your use of this information.

## 2018-03-21 NOTE — PROGRESS NOTES
S: Feeling well. Seen by MFM today due to hx of vaginal bleeding at 35+2 weeks gestation, none since.  testing good today. No follow up indicated. Per pt likely bloody show. No significant complaints or concerns. Reports consistent, daily fetal mvmt. Denies ctxs, LOF, or vaginal bldng. Denies travel to [place] [address] affected area. O: See prenatal flowsheet for maternal and fetal exam. Vtx by leopolds. Got TDAP, flu vaccine declined. Blood pressure 119/72, pulse 97, height 5' 2\" (1.575 m), weight 177 lb 5 oz (80.4 kg), last menstrual period 07/10/2017, currently breastfeeding. A:  36w2d   Size=Dates  GBS positive reviewed    P: labor precautions reviewed. FKCs stressed.    See prenatal checklist for other topics covered during today's visit  RTO in 1 weeks for [facility] with CNM

## 2018-03-21 NOTE — PROGRESS NOTES
Chief Complaint   Patient presents with    Routine Prenatal Visit     36w 2d     Visit Vitals    /72    Pulse 97    Ht 5' 2\" (1.575 m)    Wt 177 lb 5 oz (80.4 kg)    LMP 07/10/2017    BMI 32.43 kg/m2     1. Have you been to the ER, urgent care clinic since your last visit? Hospitalized since your last visit? Yes [facility] L&D overnight for bleeding episode    2. Have you seen or consulted any other health care providers outside of the [address] [place] [address] since your last visit? Include any pap smears or colon screening.  No

## 2018-03-22 NOTE — PROGRESS NOTES
GBS positive reviewed at clients [facility] appt yesterday. Please abstract GBS positive result into chart. Thank you!

## 2018-03-29 ENCOUNTER — ROUTINE PRENATAL (OUTPATIENT)
Dept: MIDWIFE SERVICES | Age: 28
End: 2018-03-29

## 2018-03-29 VITALS
SYSTOLIC BLOOD PRESSURE: 121 MMHG | WEIGHT: 181 LBS | HEIGHT: 62 IN | BODY MASS INDEX: 33.31 KG/M2 | HEART RATE: 122 BPM | DIASTOLIC BLOOD PRESSURE: 80 MMHG

## 2018-03-29 DIAGNOSIS — Z34.80 NORMAL PREGNANCY IN MULTIGRAVIDA: ICD-10-CM

## 2018-03-29 DIAGNOSIS — Z34.83 ENCOUNTER FOR SUPERVISION OF OTHER NORMAL PREGNANCY IN THIRD TRIMESTER: Primary | ICD-10-CM

## 2018-03-29 DIAGNOSIS — B95.1 POSITIVE GBS TEST: ICD-10-CM

## 2018-03-29 PROBLEM — O46.93 VAGINAL BLEEDING IN PREGNANCY, THIRD TRIMESTER: Status: ACTIVE | Noted: 2018-03-29

## 2018-03-29 PROBLEM — Z3A.37 37 WEEKS GESTATION OF PREGNANCY: Status: ACTIVE | Noted: 2018-03-29

## 2018-03-29 NOTE — PROGRESS NOTES
Chief Complaint   Patient presents with    Routine Prenatal Visit     37w 3d     Visit Vitals    /80    Pulse (!) 122    Ht 5' 2\" (1.575 m)    Wt 181 lb (82.1 kg)    LMP 07/10/2017    BMI 33.11 kg/m2     1. Have you been to the ER, urgent care clinic since your last visit? Hospitalized since your last visit? No    2. Have you seen or consulted any other health care providers outside of the [address] [place] [address] since your last visit? Include any pap smears or colon screening.  No

## 2018-03-29 NOTE — PATIENT INSTRUCTIONS
Thank you for choosing [facility]. We know you have many options when it comes to your healthcare; we appreciate that you picked us. Our goal is to provide exceptional service and world class care for every patient. You may receive a survey in the mail or by email asking for your feedback. Please take a few minutes to share your thoughts about your recent visit. Your comments helps us understand what we do well and what we can do better. To ensure confidentiality, this survey is administered by an independent third-party, [address] [place] [address] participation will help us to continue and improve the quality of care that we provide to you, your family, friends, and neighbors. Thank you! Week 37 of Your Pregnancy: Care Instructions  Your Care Instructions    You are near the end of your pregnancy-and you're probably pretty uncomfortable. It may be harder to walk around. Lying down probably isn't comfortable either. You may have trouble getting to sleep or staying asleep. Most women deliver their babies between 40 and 41 weeks. This is a good time to think about packing a bag for the hospital with items you'll need. Then you'll be ready when labor starts. Follow-up care is a key part of your treatment and safety. Be sure to make and go to all appointments, and call your doctor if you are having problems. It's also a good idea to know your test results and keep a list of the medicines you take. How can you care for yourself at home? Learn about breastfeeding  · Breastfeeding is best for your baby and good for you. · Breast milk has antibodies to help your baby fight infections. · Mothers who breastfeed often lose weight faster, because making milk burns calories. · Learning the best ways to hold your baby will make breastfeeding easier. · Let your partner bathe and diaper the baby to keep your partner from feeling left out. Snuggle together when you breastfeed.   · You may want to learn how to use a breast pump and store your milk. · If you choose to bottle feed, make the feeding feel like breastfeeding so you can bond with your baby. Always hold your baby and the bottle. Do not prop bottles or let your baby fall asleep with a bottle. Learn about crying  · It is common for babies to cry for 1 to 3 hours a day. Some cry more, some cry less. · Babies don't cry to make you upset or because you are a bad parent. · Crying is how your baby communicates. Your baby may be hungry; have gas; need a diaper change; or feel cold, warm, tired, lonely, or tense. Sometimes babies cry for unknown reasons. · If you respond to your baby's needs, he or she will learn to trust you. · Try to stay calm when your baby cries. Your baby may get more upset if he or she senses that you are upset. Know how to care for your   · Your baby's umbilical cord stump will drop off on its own, usually between 1 and 2 weeks. To care for your baby's umbilical cord area:  ¨ Clean the area at the bottom of the cord 2 or 3 times a day. ¨ Pay special attention to the area where the cord attaches to the skin. ¨ Keep the diaper folded below the cord. ¨ Use a damp washcloth or cotton ball to sponge bathe your baby until the stump has come off. · Your baby's first dark stool is called meconium. After the meconium is passed, your baby will develop his or her own bowel pattern. ¨ Some babies, especially  babies, have several bowel movements a day. Others have one or two a day, or one every 2 to 3 days. ¨  babies often have loose, yellow stools. Formula-fed babies have more formed stools. ¨ If your baby's stools look like little pellets, he or she is constipated. After 2 days of constipation, call your baby's doctor. · If your baby will be circumcised, you can care for him at home. ¨ Gently rinse his penis with warm water after every diaper change.  Do not try to remove the film that forms on the penis. This film will go away on its own. Pat dry. ¨ Put petroleum ointment, such as Vaseline, on the area of the diaper that will touch your baby's penis. This will keep the diaper from sticking to your baby. ¨ Ask the doctor about giving your baby acetaminophen (Tylenol) for pain. Where can you learn more? Go to http://henrique-cesar.info/. Enter 12 21 97 in the search box to learn more about \"Week 37 of Your Pregnancy: Care Instructions. \"  Current as of: March 16, 2017  Content Version: 11.4  © 0947-2521 [employer]. Care instructions adapted under license by [employer] (which disclaims liability or warranty for this information). If you have questions about a medical condition or this instruction, always ask your healthcare professional. [address] any warranty or liability for your use of this information.

## 2018-03-29 NOTE — PROGRESS NOTES
S: Feeling well. No significant complaints or concerns. Consistent, daily fetal mvmt. No ctxs, LOF, or vaginal bldng. States that she is feeling a lot of pelvic pressure. Recently shaved and notes an inflamed hair follicle. O: See prenatal flowsheet for maternal and fetal exam  Blood pressure 121/80, pulse (!) 122, height 5' 2\" (1.575 m), weight 181 lb (82.1 kg), last menstrual period 07/10/2017. A:  37w3d   Size=Dates      P:   Reviewed common pregnancy changes and discomfort as well as comfort measures. Suggest warm compresses for hair follicle. Declines cervical exam today. Reviewed when to come in with GBS positive status. See prenatal checklist for other topics covered during visit today.   RTO in 1 weeks for CHRISTEN with OLIVER

## 2018-04-06 ENCOUNTER — ROUTINE PRENATAL (OUTPATIENT)
Dept: MIDWIFE SERVICES | Age: 28
End: 2018-04-06

## 2018-04-06 VITALS
DIASTOLIC BLOOD PRESSURE: 86 MMHG | HEART RATE: 120 BPM | HEIGHT: 62 IN | SYSTOLIC BLOOD PRESSURE: 131 MMHG | WEIGHT: 178.5 LBS | BODY MASS INDEX: 32.85 KG/M2

## 2018-04-06 DIAGNOSIS — Z34.80 NORMAL PREGNANCY IN MULTIGRAVIDA: Primary | ICD-10-CM

## 2018-04-06 NOTE — PATIENT INSTRUCTIONS
Thank you for choosing [address] [facility]. We know you have many options when it comes to your healthcare; we appreciate that you picked us. Our goal is to provide exceptional service and world class care for every patient. You may receive a survey in the mail or by email asking for your feedback. Please take a few minutes to share your thoughts about your recent visit. Your comments helps us understand what we do well and what we can do better. To ensure confidentiality, this survey is administered by an independent third-party, [address] [place] [address] participation will help us to continue and improve the quality of care that we provide to you, your family, friends, and neighbors. Thank you! Week 39 of Your Pregnancy: Care Instructions  Your Care Instructions    During these final weeks, you may feel anxious to see your new baby.  babies often look different from what you see in pictures or movies. Right after birth, their heads may have a strange shape. Their eyes may be puffy. And their genitals may be swollen. They may also have very dry skin, or red marks on the eyelids, nose, or neck. Still, most parents think their babies are beautiful. Follow-up care is a key part of your treatment and safety. Be sure to make and go to all appointments, and call your doctor if you are having problems. It's also a good idea to know your test results and keep a list of the medicines you take. How can you care for yourself at home? Prepare to breastfeed  · If you are breastfeeding, continue to eat healthy foods. · Avoid alcohol, cigarettes, and drugs. This includes prescription and over-the-counter medicines. · You can help prevent sore nipples if you feed your baby in the correct position. Nurses will help you learn to do this. · Your  will need to be fed about every 1½ to 3 hours.   Choose the right birth control after your baby is born  · Women who are breastfeeding can still get pregnant. Use birth control if you don't want to get pregnant. · Intrauterine devices (IUDs) work for women who want to wait at least 2 years before getting pregnant again. They are safe to use while you are breastfeeding. · Depo-Provera can be used while you are breastfeeding. It is a shot you get every 3 months. · Birth control pills work well. But you need a different kind of pill while you are breastfeeding. And when you start taking these pills, you need to make sure to use another type of birth control until you start your second pack. · Diaphragms, cervical caps, tubal implants, and condoms with spermicide work less well after birth. If you have a diaphragm or cervical cap, you will need to have it refitted. · Tubal ligation (tying your tubes) and vasectomy are both permanent. These are good options if you are sure you are done having children. Where can you learn more? Go to http://henrique-cesar.info/. Enter Q676 in the search box to learn more about \"Week 39 of Your Pregnancy: Care Instructions. \"  Current as of: March 16, 2017  Content Version: 11.4  © 6599-3102 [employer] Care instructions adapted under license by "[employer] (which disclaims liability or warranty for this information). If you have questions about a medical condition or this instruction, always ask your healthcare professional. [place] [address] any warranty or liability for your use of this information. Week 38 of Your Pregnancy: Care Instructions  Your Care Instructions    Believe it or not, your baby is almost here. You may have ideas about your baby's personality because of how much he or she moves. Or you may have noticed how he or she responds to sounds, warmth, cold, and light. You may even know what kind of music your baby likes. By now, you have a better idea of what to expect during delivery.  You may have talked about your birth preferences with your doctor. But even if you want a vaginal birth, it is a good idea to learn about  births.  birth means that your baby is born through a cut (incision) in your lower belly. It is sometimes the best choice for the health of the baby and the mother. This care sheet can help you understand  births. It also gives you information about what to expect after your baby is born. And it helps you understand more about postpartum depression. Follow-up care is a key part of your treatment and safety. Be sure to make and go to all appointments, and call your doctor if you are having problems. It's also a good idea to know your test results and keep a list of the medicines you take. How can you care for yourself at home? Learn about  birth  · Most C-sections are unplanned. They are done because of problems that occur during labor. These problems might include:  ¨ Labor that slows or stops. ¨ High blood pressure or other problems for the mother. ¨ Signs of distress in the baby. These signs may include a very fast or slow heart rate. · Although most mothers and babies do well after , it is major surgery. It has more risks than a vaginal delivery. · In some cases, a planned  may be safer than a vaginal delivery. This may be the case if:  ¨ The mother has a health problem, such as a heart condition. ¨ The baby isn't in a head-down position for delivery. This is called a breech position. ¨ The uterus has scars from past surgeries. This could increase the chance of a tear in the uterus. ¨ There is a problem with the placenta. ¨ The mother has an infection, such as genital herpes, that could be spread to the baby. ¨ The mother is having twins or more. ¨ The baby weighs 9 to 10 pounds or more. · Because of the risks of , planned C-sections generally should be done only for medical reasons.  And a planned  should be done at 39 weeks or later unless there is a medical reason to do it sooner. Know what to expect after delivery, and plan for the first few weeks at home  · You, your baby, and your partner or  will get identification bands. Only people with matching bands can  the baby from the nursery. · You will learn how to feed, diaper, and bathe your baby. And you will learn how to care for the umbilical cord stump. If your baby will be circumcised, you will also learn how to care for that. · Ask people to wait to visit you until you are at home. And ask them to wash their hands before they touch your baby. · Make sure you have another adult in your home for at least 2 or 3 days after the birth. · During the first 2 weeks, limit when friends and family can visit. · Do not allow visitors who have colds or infections. Make sure all visitors are up to date with their vaccinations. Never let anyone smoke around your baby. · Try to nap when the baby naps. Be aware of postpartum depression  · \"Baby blues\" are common for the first 1 to 2 weeks after birth. You may cry or feel sad or irritable for no reason. · For some women, these feelings last longer and are more intense. This is called postpartum depression. · If your symptoms last for more than a few weeks or you feel very depressed, ask your doctor for help. · Postpartum depression can be treated. Support groups and counseling can help. Sometimes medicine can also help. Where can you learn more? Go to http://henrique-cesar.info/. Enter B044 in the search box to learn more about \"Week 38 of Your Pregnancy: Care Instructions. \"  Current as of: March 16, 2017  Content Version: 11.4  © 1459-2788 [employer]. Care instructions adapted under license by [employer] (which disclaims liability or warranty for this information).  If you have questions about a medical condition or this instruction, always ask your healthcare professional. Claudean Flock disclaims any warranty or liability for your use of this information.

## 2018-04-06 NOTE — PROGRESS NOTES
Chief Complaint   Patient presents with    Routine Prenatal Visit     38w4d     Visit Vitals    /86    Pulse (!) 120    Ht 5' 2\" (1.575 m)    Wt 178 lb 8 oz (81 kg)    LMP 07/10/2017    BMI 32.65 kg/m2     1. Have you been to the ER, urgent care clinic since your last visit? Hospitalized since your last visit? No    2. Have you seen or consulted any other health care providers outside of the [address] [place] [address] since your last visit? Include any pap smears or colon screening. No     Here today for a routine prenatal visit and she has no complaints or concerns.

## 2018-04-06 NOTE — PROGRESS NOTES
S: Feeling well. C/O increased discharge and unsure of leaking, no other significant complaints or concerns. Reports consistent, daily fetal mvmt. Denies ctxs, LOF, or vaginal bldng. Denies travel to [facility] affected area. O: See prenatal flowsheet for maternal and fetal exam  Blood pressure 131/86, pulse (!) 120, height 5' 2\" (1.575 m), weight 178 lb 8 oz (81 kg), last menstrual period 07/10/2017. Spec exam done and mucous noted. Nitrazine negative, neg Valsalva. Cervical exam 2/30/-3. BOW palpated. A:  38w4d   Size=Dates    P: Labor precautions stressed  Reviewed Peds inpatient and circumcision.    See prenatal checklist for other topics covered during today's visit  RTO in 1 weeks for CHRISTEN with OLIVER

## 2018-04-11 ENCOUNTER — ROUTINE PRENATAL (OUTPATIENT)
Dept: MIDWIFE SERVICES | Age: 28
End: 2018-04-11

## 2018-04-11 VITALS
BODY MASS INDEX: 32.94 KG/M2 | WEIGHT: 179 LBS | HEIGHT: 62 IN | DIASTOLIC BLOOD PRESSURE: 82 MMHG | SYSTOLIC BLOOD PRESSURE: 134 MMHG | HEART RATE: 102 BPM

## 2018-04-11 DIAGNOSIS — B95.1 POSITIVE GBS TEST: ICD-10-CM

## 2018-04-11 DIAGNOSIS — Z34.80 NORMAL PREGNANCY IN MULTIGRAVIDA: Primary | ICD-10-CM

## 2018-04-11 NOTE — PROGRESS NOTES
S: Feeling well. No significant complaints or concerns except increased vaginal pressure. Reports consistent, daily fetal mvmt. Denies regular strong ctxs, LOF, or vaginal bldng. Denies travel to [facility] affected area. O: See prenatal flowsheet for maternal and fetal exam  Blood pressure 134/82, pulse (!) 102, height 5' 2\" (1.575 m), weight 179 lb (81.2 kg), last menstrual period 07/10/2017, currently breastfeeding.     A:  39w2d   Size=Dates    P: labor precautions reviewed  See prenatal checklist for other topics covered during today's visit  RTO in 1 weeks for CHRISTEN with CNGABY

## 2018-04-11 NOTE — PATIENT INSTRUCTIONS
Thank you for choosing [facility]. We know you have many options when it comes to your healthcare; we appreciate that you picked us. Our goal is to provide exceptional service and world class care for every patient. You may receive a survey in the mail or by email asking for your feedback. Please take a few minutes to share your thoughts about your recent visit. Your comments helps us understand what we do well and what we can do better. To ensure confidentiality, this survey is administered by an independent third-party, [address] [place] [address] participation will help us to continue and improve the quality of care that we provide to you, your family, friends, and neighbors. Thank you! Week 39 of Your Pregnancy: Care Instructions  Your Care Instructions    During these final weeks, you may feel anxious to see your new baby.  babies often look different from what you see in pictures or movies. Right after birth, their heads may have a strange shape. Their eyes may be puffy. And their genitals may be swollen. They may also have very dry skin, or red marks on the eyelids, nose, or neck. Still, most parents think their babies are beautiful. Follow-up care is a key part of your treatment and safety. Be sure to make and go to all appointments, and call your doctor if you are having problems. It's also a good idea to know your test results and keep a list of the medicines you take. How can you care for yourself at home? Prepare to breastfeed  · If you are breastfeeding, continue to eat healthy foods. · Avoid alcohol, cigarettes, and drugs. This includes prescription and over-the-counter medicines. · You can help prevent sore nipples if you feed your baby in the correct position. Nurses will help you learn to do this. · Your  will need to be fed about every 1½ to 3 hours.   Choose the right birth control after your baby is born  · Women who are breastfeeding can still get pregnant. Use birth control if you don't want to get pregnant. · Intrauterine devices (IUDs) work for women who want to wait at least 2 years before getting pregnant again. They are safe to use while you are breastfeeding. · Depo-Provera can be used while you are breastfeeding. It is a shot you get every 3 months. · Birth control pills work well. But you need a different kind of pill while you are breastfeeding. And when you start taking these pills, you need to make sure to use another type of birth control until you start your second pack. · Diaphragms, cervical caps, tubal implants, and condoms with spermicide work less well after birth. If you have a diaphragm or cervical cap, you will need to have it refitted. · Tubal ligation (tying your tubes) and vasectomy are both permanent. These are good options if you are sure you are done having children. Where can you learn more? Go to http://henrique-cesar.info/. Enter S694 in the search box to learn more about \"Week 39 of Your Pregnancy: Care Instructions. \"  Current as of: March 16, 2017  Content Version: 11.4  © 7818-0681 [employer]. Care instructions adapted under license by [employer] (which disclaims liability or warranty for this information). If you have questions about a medical condition or this instruction, always ask your healthcare professional. [address] any warranty or liability for your use of this information.

## 2018-04-11 NOTE — PROGRESS NOTES
After discussion, Sofie Lara would like to schedule IOL due to family coming in to town for  issues with other children. IOL scheduled at 0600 on 4/20/18 at 0600. Labor precautions and FKCS stressed.

## 2018-04-11 NOTE — PROGRESS NOTES
Chief Complaint   Patient presents with    Routine Prenatal Visit     39w2d     Visit Vitals    /82    Pulse (!) 102    Ht 5' 2\" (1.575 m)    Wt 179 lb (81.2 kg)    LMP 07/10/2017    BMI 32.74 kg/m2     1. Have you been to the ER, urgent care clinic since your last visit? Hospitalized since your last visit? No    2. Have you seen or consulted any other health care providers outside of the [address] [place] [address] since your last visit? Include any pap smears or colon screening. No     Here today for a routine prenatal visit and she has complaints of lots of pressure.

## 2018-04-12 ENCOUNTER — HOSPITAL ENCOUNTER (INPATIENT)
Age: 28
LOS: 2 days | Discharge: HOME | DRG: 560 | End: 2018-04-14
Attending: OBSTETRICS & GYNECOLOGY | Admitting: ADVANCED PRACTICE MIDWIFE
Payer: MEDICAID

## 2018-04-12 ENCOUNTER — ROUTINE PRENATAL (OUTPATIENT)
Dept: MIDWIFE SERVICES | Age: 28
End: 2018-04-12

## 2018-04-12 VITALS
DIASTOLIC BLOOD PRESSURE: 94 MMHG | SYSTOLIC BLOOD PRESSURE: 135 MMHG | BODY MASS INDEX: 32.76 KG/M2 | HEART RATE: 106 BPM | WEIGHT: 178 LBS | HEIGHT: 62 IN

## 2018-04-12 DIAGNOSIS — Z34.80 NORMAL PREGNANCY IN MULTIGRAVIDA: Primary | ICD-10-CM

## 2018-04-12 DIAGNOSIS — B95.1 POSITIVE GBS TEST: ICD-10-CM

## 2018-04-12 LAB
BASOPHILS # BLD: 0.1 K/UL (ref 0–0.1)
BASOPHILS NFR BLD: 1 % (ref 0–1)
DIFFERENTIAL METHOD BLD: ABNORMAL
EOSINOPHIL # BLD: 0.2 K/UL (ref 0–0.4)
EOSINOPHIL NFR BLD: 1 % (ref 0–7)
ERYTHROCYTE [DISTWIDTH] IN BLOOD BY AUTOMATED COUNT: 14.6 % (ref 11.5–14.5)
HCT VFR BLD AUTO: 36.7 % (ref 35–47)
HGB BLD-MCNC: 12.4 G/DL (ref 11.5–16)
IMM GRANULOCYTES # BLD: 0.4 K/UL (ref 0–0.04)
IMM GRANULOCYTES NFR BLD AUTO: 2 % (ref 0–0.5)
LYMPHOCYTES # BLD: 3.1 K/UL (ref 0.8–3.5)
LYMPHOCYTES NFR BLD: 13 % (ref 12–49)
MCH RBC QN AUTO: 32.8 PG (ref 26–34)
MCHC RBC AUTO-ENTMCNC: 33.8 G/DL (ref 30–36.5)
MCV RBC AUTO: 97.1 FL (ref 80–99)
MONOCYTES # BLD: 1.7 K/UL (ref 0–1)
MONOCYTES NFR BLD: 7 % (ref 5–13)
NEUTS SEG # BLD: 18 K/UL (ref 1.8–8)
NEUTS SEG NFR BLD: 77 % (ref 32–75)
NRBC # BLD: 0 K/UL (ref 0–0.01)
NRBC BLD-RTO: 0 PER 100 WBC
PLATELET # BLD AUTO: 340 K/UL (ref 150–400)
PMV BLD AUTO: 10.5 FL (ref 8.9–12.9)
RBC # BLD AUTO: 3.78 M/UL (ref 3.8–5.2)
WBC # BLD AUTO: 23.5 K/UL (ref 3.6–11)

## 2018-04-12 PROCEDURE — 74011250636 HC RX REV CODE- 250/636: Performed by: ADVANCED PRACTICE MIDWIFE

## 2018-04-12 PROCEDURE — 77010026064 HC OXYGEN INFANT MED AIR MIN: Performed by: ADVANCED PRACTICE MIDWIFE

## 2018-04-12 PROCEDURE — 85025 COMPLETE CBC W/AUTO DIFF WBC: CPT | Performed by: ADVANCED PRACTICE MIDWIFE

## 2018-04-12 PROCEDURE — 75410000000 HC DELIVERY VAGINAL/SINGLE: Performed by: ADVANCED PRACTICE MIDWIFE

## 2018-04-12 PROCEDURE — 74011250637 HC RX REV CODE- 250/637: Performed by: ADVANCED PRACTICE MIDWIFE

## 2018-04-12 PROCEDURE — 75410000003 HC RECOV DEL/VAG/CSECN EA 0.5 HR: Performed by: ADVANCED PRACTICE MIDWIFE

## 2018-04-12 PROCEDURE — 65270000029 HC RM PRIVATE

## 2018-04-12 PROCEDURE — 75410000002 HC LABOR FEE PER 1 HR: Performed by: ADVANCED PRACTICE MIDWIFE

## 2018-04-12 PROCEDURE — 36415 COLL VENOUS BLD VENIPUNCTURE: CPT | Performed by: ADVANCED PRACTICE MIDWIFE

## 2018-04-12 RX ORDER — NALOXONE HYDROCHLORIDE 0.4 MG/ML
0.4 INJECTION, SOLUTION INTRAMUSCULAR; INTRAVENOUS; SUBCUTANEOUS AS NEEDED
Status: DISCONTINUED | OUTPATIENT
Start: 2018-04-12 | End: 2018-04-14 | Stop reason: HOSPADM

## 2018-04-12 RX ORDER — IBUPROFEN 800 MG/1
800 TABLET ORAL EVERY 8 HOURS
Status: DISCONTINUED | OUTPATIENT
Start: 2018-04-12 | End: 2018-04-14 | Stop reason: HOSPADM

## 2018-04-12 RX ORDER — HYDROCODONE BITARTRATE AND ACETAMINOPHEN 5; 325 MG/1; MG/1
2 TABLET ORAL
Status: DISCONTINUED | OUTPATIENT
Start: 2018-04-12 | End: 2018-04-14 | Stop reason: HOSPADM

## 2018-04-12 RX ORDER — HYDROCODONE BITARTRATE AND ACETAMINOPHEN 5; 325 MG/1; MG/1
1 TABLET ORAL
Status: DISCONTINUED | OUTPATIENT
Start: 2018-04-12 | End: 2018-04-14 | Stop reason: HOSPADM

## 2018-04-12 RX ORDER — MISOPROSTOL 200 UG/1
800 TABLET ORAL ONCE
Status: COMPLETED | OUTPATIENT
Start: 2018-04-12 | End: 2018-04-12

## 2018-04-12 RX ORDER — SODIUM CHLORIDE, SODIUM LACTATE, POTASSIUM CHLORIDE, CALCIUM CHLORIDE 600; 310; 30; 20 MG/100ML; MG/100ML; MG/100ML; MG/100ML
125 INJECTION, SOLUTION INTRAVENOUS CONTINUOUS
Status: DISCONTINUED | OUTPATIENT
Start: 2018-04-12 | End: 2018-04-12

## 2018-04-12 RX ORDER — NALOXONE HYDROCHLORIDE 0.4 MG/ML
0.4 INJECTION, SOLUTION INTRAMUSCULAR; INTRAVENOUS; SUBCUTANEOUS AS NEEDED
Status: DISCONTINUED | OUTPATIENT
Start: 2018-04-12 | End: 2018-04-12

## 2018-04-12 RX ORDER — FENTANYL/BUPIVACAINE/NS/PF 2-1250MCG
1-16 PREFILLED PUMP RESERVOIR EPIDURAL CONTINUOUS
Status: DISCONTINUED | OUTPATIENT
Start: 2018-04-12 | End: 2018-04-12

## 2018-04-12 RX ORDER — OXYTOCIN/RINGER'S LACTATE 20/1000 ML
125-500 PLASTIC BAG, INJECTION (ML) INTRAVENOUS ONCE
Status: COMPLETED | OUTPATIENT
Start: 2018-04-12 | End: 2018-04-12

## 2018-04-12 RX ORDER — OXYTOCIN IN 5 % DEXTROSE 30/500 ML
.5-2 PLASTIC BAG, INJECTION (ML) INTRAVENOUS
Status: DISCONTINUED | OUTPATIENT
Start: 2018-04-12 | End: 2018-04-12

## 2018-04-12 RX ORDER — HYDROCORTISONE ACETATE PRAMOXINE HCL 2.5; 1 G/100G; G/100G
CREAM TOPICAL AS NEEDED
Status: DISCONTINUED | OUTPATIENT
Start: 2018-04-12 | End: 2018-04-14 | Stop reason: HOSPADM

## 2018-04-12 RX ORDER — LIDOCAINE HYDROCHLORIDE 10 MG/ML
INJECTION, SOLUTION EPIDURAL; INFILTRATION; INTRACAUDAL; PERINEURAL
Status: DISCONTINUED
Start: 2018-04-12 | End: 2018-04-12

## 2018-04-12 RX ORDER — ACETAMINOPHEN 325 MG/1
650 TABLET ORAL
Status: DISCONTINUED | OUTPATIENT
Start: 2018-04-12 | End: 2018-04-14 | Stop reason: HOSPADM

## 2018-04-12 RX ORDER — SWAB
1 SWAB, NON-MEDICATED MISCELLANEOUS DAILY
Status: DISCONTINUED | OUTPATIENT
Start: 2018-04-13 | End: 2018-04-14 | Stop reason: HOSPADM

## 2018-04-12 RX ORDER — OXYTOCIN IN 5 % DEXTROSE 30/500 ML
PLASTIC BAG, INJECTION (ML) INTRAVENOUS
Status: DISCONTINUED
Start: 2018-04-12 | End: 2018-04-12

## 2018-04-12 RX ADMIN — Medication 999 MILLI-UNITS/MIN: at 12:25

## 2018-04-12 RX ADMIN — PENICILLIN G POTASSIUM 5 MILLION UNITS: 5000000 INJECTION, POWDER, FOR SOLUTION INTRAMUSCULAR; INTRAVENOUS at 10:46

## 2018-04-12 RX ADMIN — MISOPROSTOL 800 MCG: 200 TABLET ORAL at 15:16

## 2018-04-12 RX ADMIN — IBUPROFEN 800 MG: 800 TABLET ORAL at 22:05

## 2018-04-12 RX ADMIN — IBUPROFEN 800 MG: 800 TABLET ORAL at 14:34

## 2018-04-12 RX ADMIN — Medication 10000 MILLI-UNITS/HR: at 14:09

## 2018-04-12 RX ADMIN — SODIUM CHLORIDE, SODIUM LACTATE, POTASSIUM CHLORIDE, AND CALCIUM CHLORIDE 125 ML/HR: 600; 310; 30; 20 INJECTION, SOLUTION INTRAVENOUS at 10:46

## 2018-04-12 NOTE — PROGRESS NOTES
Chief Complaint   Patient presents with    Rupture of Membranes     feeling contractions about every 3-4 minutes     Visit Vitals    BP (!) 135/94    Pulse (!) 106    Ht 5' 2\" (1.575 m)    Wt 178 lb (80.7 kg)    LMP 07/10/2017    BMI 32.56 kg/m2     1. Have you been to the ER, urgent care clinic since your last visit? Hospitalized since your last visit? No    2. Have you seen or consulted any other health care providers outside of the [address] [place] [address] since your last visit? Include any pap smears or colon screening.  No     Here today because her water broke at 730 this morning and started having contractions \"just a little while ago\"

## 2018-04-12 NOTE — H&P
History and Physical    Patient: Tami Bolanos MRN: [id number]  SSN: [id number]    YOB: 1990  Age: 32 y.o. Sex: female      Subjective:      Tami Bolanos is a 32 y.o. female who presents to L&D from the office after confirmation of srom. Pt states she woke this morning and had leaking at about 0730. No past medical history on file. Past Surgical History:   Procedure Laterality Date    HX WISDOM TEETH EXTRACTION  2012    HX WRIST FRACTURE TX Left 2014      Family History   Problem Relation Age of Onset    Other Mother     Heart Attack Mother 55    Cancer Mother      thyroid    Heart Attack Father 52     low blood pressure, pace maker    No Known Problems Sister     Heart Disease Maternal Grandmother     Cancer Maternal Grandfather 66     pancreatic     Social History   Substance Use Topics    Smoking status: Former Smoker     Packs/day: 0.25     Quit date: 10/20/2017    Smokeless tobacco: Never Used      Comment: Will try cold turkey for 2 weeks. Will try chantix or zyban if  not successful    Alcohol use No      Prior to Admission medications    Medication Sig Start Date End Date Taking? Authorizing Provider   PNV ES.55/GTIORXR FUM/FOLIC AC (PRENATAL PO) Take  by mouth. Historical Provider        Allergies   Allergen Reactions    Bee Sting [Sting, Bee] Swelling       Review of Systems:  A comprehensive review of systems was negative except for that written in the History of Present Illness. Objective: There were no vitals filed for this visit. Physical Exam:  GENERAL: alert, cooperative, no distress, appears stated age    Assessment:     Hospital Problems  Date Reviewed: 4/11/2018          Codes Class Noted POA    Pregnancy ICD-10-CM: Z34.90  ICD-9-CM: V22.2  3/13/2018 Unknown              Plan:     Pt unsure regarding epidural.  Will provide labor support.

## 2018-04-12 NOTE — PROGRESS NOTES
Skyler Shah seen in office for possible SROM and labor. Is bent over, breathing through contractions. States they are about every 2 minutes and very strong. States she has completely saturated about 7 pairs of pants in last 2 hours. States she believes her water broke at 0730 and contractions started soon after. Reports she is feeling a lot of pressure and feels like she may need to have a BM.     O: SVE: 4/50/-2 per EVANGELINA Adhikari  See flow for vitals. A:  Active labor  Probable SROM, GBS positive    P:  Client sent straight to labor and delivery, accompanied by [address] [place]. [facility] Nurse, CNM on call notified, L and D aware to expect client.

## 2018-04-12 NOTE — IP AVS SNAPSHOT
Summary of Care Report The Summary of Care report has been created to help improve care coordination. Users with access to Page[employer] or [address] (Web-based application) may access additional patient information including the Discharge Summary. If you are not currently a [address] user and need more information, please call the number listed below in the [place] [address] section and ask to be connected with Medical Records. Facility Information Name Address Phone [address] [place] [id number] [phone] Patient Information Patient Name Sex  Thais German ([id number]) Female 1990 Discharge Information Admitting Provider Service Area Unit Nithya Corral CNM / [phone] 3 Mother Infant / [phone] Discharge Provider Discharge Date/Time Discharge Disposition Destination (none) 2018 (Pending) AHR (none) Patient Language Language ENGLISH [13] Hospital Problems as of 2018  Reviewed: 2018  9:03 AM by Thelma Silveira CNM Class Noted - Resolved Last Modified POA Active Problems Pregnancy  3/13/2018 - Present 2018 by Nithya Corral CNM Unknown Entered by Nithya Corral CNM Non-Hospital Problems as of 2018  Reviewed: 2018  9:03 AM by Thelma Silveira CNM Class Noted - Resolved Last Modified Active Problems Normal pregnancy in multigravida  2017 - Present 2018 by Nadya Willard CNM Entered by María Elena Wolf CNM Overview Addendum 2018  2:13 PM by Nadya Willard CNM OK with student for delivery Baby Boy: Jimmy Alvarez  Collaborating MD 
Centering- no 
[place] 2018 by  LMP and conception date Genetic screening - not done Social - stay at home,  \"Larry\" NOB labs normal  
20 wk anatomy Gtt - WNL Rhogam  -  A pos Tdap  Done 2/15/18   Flu - declines Circ - yes, in hospital 
GBS - positive Labor Plans unmedicated if possible Vaginal bleeding in pregnancy, third trimester  3/29/2018 - Present 3/29/2018 by Guadalupe Abreu CNM Entered by Guadalupe Abreu CNM  
  37 weeks gestation of pregnancy  3/29/2018 - Present 3/29/2018 by Guadalupe Abreu CNM Entered by Guadalupe Abreu CNM You are allergic to the following Allergen Reactions Bee Sting (Sting, Bee) Swelling Current Discharge Medication List  
  
CONTINUE these medications which have NOT CHANGED Dose & Instructions Dispensing Information Comments PRENATAL PO Take  by mouth. Refills:  0 Current Immunizations Name Date Tdap 2/15/2018 Follow-up Information Follow up With Details Comments Contact Info None   None (395) Patient stated that they have no PCP Discharge Instructions POST DELIVERY DISCHARGE INSTRUCTIONS Name: Guy Pike YOB: 1990 Primary Diagnosis: Active Problems: 
  Pregnancy (3/13/2018) General:  
 
Diet/Diet Restrictions: 
Eight 8-ounce glasses of fluid daily (water, juices); avoid excessive caffeine intake. Meals/snacks as desired which are high in fiber and carbohydrates and low in fat and cholesterol. Physical Activity / Restrictions / Safety:  
 
Avoid heavy lifting, no more that 8 lbs. For 2-3 weeks; limit use of stairs to 2 times daily for the first week home. No driving for one week. Avoid intercourse 4-6 weeks, no douching or tampon use. Check with obstetrician before starting or resuming an exercise program.    
 
 
Discharge Instructions/Special Treatment/Home Care Needs:  
 
Continue prenatal vitamins.  
Continue to use squirt bottle with warm water on your episiotomy after each bathroom use until bleeding stops. If steri-strips applied to your incision, remove in 7-10 days. Call your doctor for the following:  
 
Fever over 101 degrees by mouth. Vaginal bleeding heavier than a normal menstrual period or clot larger than a golf ball. Red streaks or increased swelling of legs, painful red streaks on your breast. 
Painful urination, constipation and increased pain or swelling or discharge with your incision. If you feel extremely anxious or overwhelmed. If you have thoughts of harming yourself and/or your baby. Pain Management:  
 
Pain Management:  
Take Acetaminophen (Tylenol) or Ibuprofen (Advil, Motrin), as directed for pain. Use a warm Sitz bath 3 times daily to relieve episiotomy or hemorrhoidal discomfort. Heating pad to  incision as needed. For hemorrhoidal discomfort, use Tucks and Anusol cream as needed and directed. Follow-Up Care: These are general instructions for a healthy lifestyle: No smoking/ No tobacco products/ Avoid exposure to second hand smoke Surgeon General's Warning:  Quitting smoking now greatly reduces serious risk to your health. Obesity, smoking, and sedentary lifestyle greatly increases your risk for illness A healthy diet, regular physical exercise & weight monitoring are important for maintaining a healthy lifestyle Recognize signs and symptoms of STROKE: 
 
F-face looks uneven A-arms unable to move or move unevenly S-speech slurred or non-existent T-time-call 911 as soon as signs and symptoms begin-DO NOT go Back to bed or wait to see if you get better-TIME IS BRAIN. Hyperpot Activation Thank you for requesting access to Hyperpot. Please follow the instructions below to securely access and download your online medical record. Hyperpot allows you to send messages to your doctor, view your test results, renew your prescriptions, schedule appointments, and more. How Do I Sign Up? 1. In your internet browser, go to [email or web address] the stars 
2. Click on the First Time User? Click Here link in the Sign In box. You will be redirect to the New Member Sign Up page. 3. Enter your Quirky Access Code exactly as it appears below. You will not need to use this code after youve completed the sign-up process. If you do not sign up before the expiration date, you must request a new code. Quirky Access Code: [email or web address] Expires: 4/15/2018 10:57 AM (This is the date your Quirky access code will ) 4. Enter the last four digits of your Social Security Number (xxxx) and Date of Birth (mm/dd/yyyy) as indicated and click Submit. You will be taken to the next sign-up page. 5. Create a Quirky ID. This will be your Quirky login ID and cannot be changed, so think of one that is secure and easy to remember. 6. Create a Quirky password. You can change your password at any time. 7. Enter your Password Reset Question and Answer. This can be used at a later time if you forget your password. 8. Enter your e-mail address. You will receive e-mail notification when new information is available in [address] 9. Click Sign Up. You can now view and download portions of your medical record. 10. Click the Download Summary menu link to download a portable copy of your medical information. Additional Information If you have questions, please visit the Frequently Asked Questions section of the Quirky website at [email or web address] Remember, Quirky is NOT to be used for urgent needs. For medical emergencies, dial 911. Learning About Starting to Breastfeed Planning ahead Before your baby is born, plan ahead. Learn all you can about breastfeeding. This helps make breastfeeding easier. · Early in your pregnancy, talk to your doctor or midwife about breastfeeding. · Learn the basics before your baby is born.  The staff at hospitals and birthing centers can help you find a lactation specialist. This person is often a nurse who has been trained to teach and advise women about breastfeeding. Or you can take a breastfeeding class. · Plan ahead for times when you will need help after your baby is born. Many women get help from friends and family. Some join a support group to talk to other moms who breastfeed. · Buy the equipment you'll need. Examples are breast pads, nipple cream, extra pillows, and nursing bras. Find out about breast pumps too. Getting help from your hospital or birthing center It's important to have support from the doctors, nurses, and hospital staff who care for you and your baby. Before it's time for you to give birth, ask about the breastfeeding policies at your hospital or birthing center. Look for a hospital or birthing center that has policies for: · \"Rooming in. \" This policy encourages you to have your baby in the room with you. It can allow you to breastfeed more often. · Supplemental feedings. Tell the staff that your baby is to get only your breast milk from birth. If staff feed your baby water, sugar solution, or formula right after birth without a medical reason, it may make it harder for you to breastfeed. · Pacifiers or artificial nipples. Staff should not give your  these items without your permission. They may interfere with breastfeeding. · Follow-up. Find out if your hospital can help you with breastfeeding issues after you go home. See if you can get information on support groups or other contacts. They might help if you need help setting up and staying with your breastfeeding routine. Your first feeding It's best to start breastfeeding within 1 hour of birth. For each feeding, you go through these basic steps: · Get ready for the feeding. Be calm and relaxed, and try not to be distracted. Get some water or juice for yourself.  Use two or three pillows to help support your baby while he or she is nursing. · Find a breastfeeding position that is comfortable for you and your baby. Examples are the cradle and the football positions. Make sure the baby's head and chest are lined up straight and facing your breast. It's best to switch which breast you start with each time. · Get the baby latched on well. Your baby's mouth needs to be wide open, like a yawn, so you may need to gently touch the middle of your baby's lower lip. When your baby's mouth is open wide, quickly bring the baby onto your nipple and areola. The areola is the dark [place] around your nipple. · Provide a complete feeding. Let your baby nurse for at least 15 minutes. Be sure to burp your baby after each breast. 
In the first days after birth, your breasts make a thick, yellow liquid called colostrum. This liquid gives your baby nutrients and antibodies against infection. It is all that babies need at first. Your breasts will fill with milk a few days after the birth. Talk to your doctor, midwife, or lactation specialist right away if you are having problems and aren't sure what to do. How often to breastfeed Plan to breastfeed your baby on demand rather than setting a strict schedule. For the first few days, be prepared to breastfeed every 1 to 3 hours. That often works out to about 8 to 12 times in a 24-hour period. Wake a sleeping baby to feed, if you need to. If you breastfeed more often, it will help your breasts to produce more milk. After you go home After you're home, don't be afraid to call your doctor, midwife, or lactation specialist with questions. That's true even if you don't know what's bothering you. They are used to parents of newborns calling. They can help you figure out if there is a problem, and if so, how to fix it. Plan for times when you will be apart from your baby. Use a breast pump to collect breast milk ahead of time.  You can store milk in the refrigerator or freezer. Then it's ready when someone else will be taking care of your baby. Breastfeeding is a learned skill that gets easier over time. You are more likely to succeed if you plan ahead, learn the basic techniques, and know where to get help and support. Where can you learn more? Go to http://henrique-cesar.info/. Enter R734 in the search box to learn more about \"Learning About Starting to Breastfeed. \" Current as of: 2017 Content Version: 11.4 © 7576-3546 theDrop. Care instructions adapted under license by [employer] (which disclaims liability or warranty for this information). If you have questions about a medical condition or this instruction, always ask your healthcare professional. [address] any warranty or liability for your use of this information. Medications:  
*Ibuprofen (over the counter) up to 600 mg every 6 hours as needed for pain or cramping *Continue Prenatal vitamins and DHA supplements while breastfeeding *You may use a stool softener if needed until comfortable with bowel movements, may take up to 100 mg of docusate sodium (Colace- over the counter) twice daily Appointments: *Follow-up with CNM in 2 to 6 weeks as directed Your Care Instructions Congratulations on the birth of your baby. Like pregnancy, the  period can be a time of excitement, asya, and exhaustion. You may look at your wondrous little baby and feel happy. You may also be overwhelmed by your new sleep hours and new responsibilities. At first, babies often sleep during the days and are awake at night. They do not have a pattern or routine. They may make sudden gasps, jerk themselves awake, or look like they have crossed eyes. These are all normal, and they may even make you smile. In these first weeks after delivery, try to take good care of yourself.  It may take 4 to 6 weeks to feel like yourself again, and possibly longer if you had a  birth. You will likely feel very tired for several weeks. Your days will be full of ups and downs, but lots of asya as well. Follow-up care is a key part of your treatment and safety. Be sure to make and go to all appointments, and call your midwife if you are having problems. It's also a good idea to know your test results and keep a list of the medicines you take. How can you care for yourself at home? Take care of your body after delivery · Use pads instead of tampons for the bloody flow that may last as long as 2 weeks. · Ease cramps with ibuprofen (Advil). · Ease soreness of hemorrhoids and the area between your vagina and rectum with ice compresses or witch hazel pads. · Ease constipation by drinking lots of fluid and eating high-fiber foods. Ask your midwife about over-the-counter stool softeners. · Cleanse yourself with a gentle squeeze of warm water from a bottle instead of wiping with toilet paper. · Take a sitz bath in warm water several times a day. · Wear a good nursing bra. Ease sore and swollen breasts with warm, wet washcloths. · If you are not breast-feeding, use ice rather than heat for breast soreness. · Your period may not start for several months if you are breast-feeding. You may bleed more, and longer at first, than you did before you got pregnant. · Wait until you are healed (about 4 to 6 weeks) before you have sexual intercourse. · Try not to travel with your baby for 5 or 6 weeks. If you take a long car trip, make frequent stops to walk around and stretch. Avoid exhaustion · Rest every day. Try to nap when your baby naps. · Ask another adult to be with you for a few days after delivery. · Plan for  if you have other children. · Stay flexible so you can eat at odd hours and sleep when you need to. Both you and your baby are making new schedules. · Plan small trips to get out of the house. Change can make you feel less tired. · Ask for help with housework, cooking, and shopping. Remind yourself that your job is to care for your baby. Know about help for postpartum depression · \"Baby blues are common for the first 1 to 2 weeks after birth. You may cry or feel sad or irritable for no reason. · Rest whenever you can. Being tired makes it harder to handle your emotions. · Go for walks with your baby. · Talk to your partner, friends, and family about your feelings. · If your symptoms last for more than a few weeks, or if you feel very depressed, ask your doctor for help. · Postpartum depression can be treated. Support groups and counseling can help. Sometimes medicine can also help. Stay healthy · Eat healthy foods so you have more energy, make good breast milk, and lose extra baby pounds. · If you breast-feed, avoid alcohol and drugs. Stay smoke-free. If you quit during pregnancy, congratulations. · Start daily exercise after 4 to 6 weeks, but rest when you feel tired. · Learn exercises to tone your belly. Do Kegel exercises to regain strength in your pelvic muscles. You can do these exercises while you stand or sit. ¨ Squeeze the same muscles you would use to stop your urine. Your belly and rear end (buttocks) should not move. ¨ Hold the squeeze for 3 seconds, then relax for 3 seconds. ¨ Repeat the exercise 10 to 15 times for each session. Do three or more sessions each day. · Find a class for new mothers and new babies that has an exercise time. · If you had a  birth, give yourself a bit more time before you exercise, and be careful. When should you call for help? Call 911 anytime you think you may need emergency care. For example, call if: 
· You have sudden, severe pain in your belly. · You passed out (lost consciousness). Call your provider now or seek immediate medical care if: · You have severe vaginal bleeding. You are passing blood clots and soaking through a pad each hour for 2 or more hours. · Your vaginal bleeding seems to be getting heavier or is still bright red 4 days after delivery, or you pass blood clots larger than the size of a golf ball. · You are dizzy or lightheaded, or you feel like you may faint. · You are vomiting or cannot keep fluids down. · You have a fever. · You have new or more belly pain. · You pass tissue (not just blood). · Your breast or breasts have hard, red, or tender areas. · You have an urgent or frequent need to urinate, along with a burning feeling. · You have severe pain, tenderness, or swelling in your vagina or the area between your rectum and vagina. · You have severe pains in your chest, belly, back, or legs. · You have feelings of severe despair or great anxiety. · Your baby is unusually cranky or is sleeping too much. · Your baby's eyes are red or have discharge. · Your baby has white patches on the roof and sides of the mouth or tongue. · Your baby's umbilical cord is foul-smelling, swollen, red, or leaking pus. · There is blood or mucus in your baby's bowel movements. · Your baby has fewer than 6 wet diapers a day. · Your baby does not want to eat, or your baby is throwing up with every feeding. · Your baby has trouble breathing. · Your baby has a rectal temperature of 100.4°F or more, or an underarm temperature over 99.4°F. 
· You baby has a low temperature less than 97.5°F rectal, or less than 97. 0°F underarm. · Your baby's skin looks yellow. Watch closely for changes in your health, and be sure to contact your doctor if you have any problems. Where can you learn more? Go to [employer].be Enter A461 in the search box to learn more about \"After Your Delivery (the Postpartum Period): After Your Visit. \"  
© 2866-2765 Healthwise, Incorporated.  Care instructions adapted under license by [facility] (which disclaims liability or warranty for this information). This care instruction is for use with your licensed healthcare professional. If you have questions about a medical condition or this instruction, always ask your healthcare professional. Cecilyfrancisco Chencho any warranty or liability for your use of this information. Content Version: 8.8.31255; Last Revised: July 8, 2010 Depression After Childbirth: After Your Birth Many women get the \"baby blues\" during the first few days after childbirth. They may lose sleep, feel irritable, cry easily, and feel happy one minute and sad the next. Hormone changes are one cause of these emotional changes. Also, the demands of a new baby, coupled with visits from relatives or other family needs, add to a mother's stress. The \"baby blues\" usually peak around the fourth day and then ease up in less than 2 weeks. If your moodiness or anxiety lasts for more than 2 weeks, or if you feel like life is not worth living, you may have postpartum depression. This is different for each mother. Some mothers with serious depression may worry intensely about their infant's well-being, while others may feel distant from their child. Some mothers might even feel that they might harm their baby. A mother may have signs of paranoia, wondering if someone is watching her. Depression is not a sign of weakness. It is a medical condition that requires treatment. Medicine and counseling are often effective at reducing depression. Talk to your midwife about taking antidepressant medicine while breast-feeding. Follow-up care is a key part of your treatment and safety. Be sure to make and go to all appointments, and call your midwife if you are having problems. It's also a good idea to know your test results and keep a list of the medicines you take. How can you care for yourself at home? · Take your medicines exactly as prescribed.  Call your provider if you think you are having a problem with your medicine. · Eat a balanced diet, so that you can keep up your energy. · Get regular daily exercise, such as walks, to help improve your mood. · Get as much sunlight as possible. Keep your shades and curtains open, and get outside as much as you can. · Avoid using alcohol or other substances to feel better. · Get as much rest and sleep as possible, and avoid doing too much. Being too tired can increase depression. · Play stimulating music throughout your day and soothing music at night. · Schedule outings and visits with friends and family. Ask them to call you regularly, so that you do not feel alone. · Ask for help with preparing food and other daily tasks. Family and friends are often happy to help a mother with a . · Be honest with yourself and those who care about you. Tell them about your struggle. · Join a support group of new mothers. No one can better understand the challenges of caring for a  than other new mothers. When should you call for help? Call 911 anytime you think you may need emergency care. For example, call if: 
· You feel you cannot stop from hurting yourself or someone else. Call your provider now or seek immediate medical care if: 
· You are having trouble caring for yourself or your baby. · You have signs of paranoia that can occur with postpartum depression. You fear that someone is watching you, stealing from you, or reading your mind. · You hear voices. · You have symptoms of postpartum depression, such as: ¨ Sleeplessness. ¨ Anxiety. ¨ Hopelessness. ¨ Irritability. ¨ Poor concentration. · Someone you know has depression and: 
¨ Starts to give away his or her possessions. ¨ Uses illegal drugs or drinks alcohol heavily. ¨ Talks or writes about death, including writing suicide notes and talking about guns, knives, or pills. ¨ Starts to spend a lot of time alone. ¨ Acts very aggressively or suddenly appears calm. Watch closely for changes in your health, and be sure to contact your doctor if you have any problems. Where can you learn more? Go to [employer].be Enter K313 in the search box to learn more about \"Depression After Childbirth: After Your Visit. \"  
© 0368-7245 Healthwise, Incorporated. Care instructions adapted under license by [employer] (which disclaims liability or warranty for this information). This care instruction is for use with your licensed healthcare professional. If you have questions about a medical condition or this instruction, always ask your healthcare professional. Devon Ordonez any warranty or liability for your use of this information. Content Version: 8.8.37909; Last Revised: April 27, 2009 Chart Review Routing History No Routing History on File

## 2018-04-12 NOTE — PROGRESS NOTES
1130: Patient requesting epidural, LR bolus started. 1210: Anesthesia at bedside for epidural placement, pt states she has to push, SVE performed by midwife student KALI Herrera 8/-1. Patient states she would like to try nitrous instead of the epidural. KALI Duran and this RN remaining at bedside to support patient. 1216: Patient bearing down with contractions, RN and providers encouraging patient.  at bedside supporting patient as well. 1217: shoulder dystocia recognized by Carolene Boas. HOB lowered, pt encouraged to give more maternal effort. Patient continuing to push. 1218: Reduction of posterior arm by CNGABY Goode,   viable male infant, infant dried and stimulated and placed on maternal abdomen, bulb suction to infant mouth and nose. Cord clamped by CNM and cut by FOB. Infant taken over to the warmer to be assessed by Rufino Mo RN and HEMANT Neumann RN. OLIVER Skinner remaining at perineum for delivery of placenta. Shoulder dystocia for approximately 60 seconds. 1227:  intact placenta, CNM assessing for tears, none present. Margo-care provided, pad changed ice pack applied to perineum. 1340: Patient up to void, RN assisting patient    [phone]: CNM given update on patient, will hang second bag of pitocin due to bleeding. 1500: CNM Corinne Quispe made aware of patients bleeding, CNM stated she will get in touch with OLIVER Skinner or come see the patient shortly. 1510: CNM at bedside and ordering 800mcg of cytotec rectally. 1515: CNM placing cytotec. 1635: Patient and infant transferred to MIU room 303; both in stable condition, OB SBAR report to ARGELIA Roman RN

## 2018-04-12 NOTE — IP AVS SNAPSHOT
[address] 
[phone] Patient: Allan Jones MRN: [id number] WII:[birth year] About your hospitalization You were admitted on:  April 12, 2018 You [facility] received care in the:  [facility] [address] You were discharged on:  April 14, 2018 Why you were hospitalized Your primary diagnosis was:  Not on File Your diagnoses also included:  Pregnancy Follow-up Information Follow up With Details Comments Contact Info None   None ([phone]) Patient stated that they have no PCP Discharge Orders None A check bernabe indicates which time of day the medication should be taken. My Medications CONTINUE taking these medications Instructions Each Dose to Equal  
 Morning Noon Evening Bedtime PRENATAL PO Your last dose was: Your next dose is: Take  by mouth. Discharge Instructions POST DELIVERY DISCHARGE INSTRUCTIONS Name: Allan Jones YOB: 1990 Primary Diagnosis: Active Problems: 
  Pregnancy (3/13/2018) General:  
 
Diet/Diet Restrictions: 
Eight 8-ounce glasses of fluid daily (water, juices); avoid excessive caffeine intake. Meals/snacks as desired which are high in fiber and carbohydrates and low in fat and cholesterol. Physical Activity / Restrictions / Safety:  
 
Avoid heavy lifting, no more that 8 lbs. For 2-3 weeks; limit use of stairs to 2 times daily for the first week home. No driving for one week. Avoid intercourse 4-6 weeks, no douching or tampon use. Check with obstetrician before starting or resuming an exercise program.    
 
 
Discharge Instructions/Special Treatment/Home Care Needs:  
 
Continue prenatal vitamins. Continue to use squirt bottle with warm water on your episiotomy after each bathroom use until bleeding stops. If steri-strips applied to your incision, remove in 7-10 days. Call your doctor for the following:  
 
Fever over 101 degrees by mouth. Vaginal bleeding heavier than a normal menstrual period or clot larger than a golf ball. Red streaks or increased swelling of legs, painful red streaks on your breast. 
Painful urination, constipation and increased pain or swelling or discharge with your incision. If you feel extremely anxious or overwhelmed. If you have thoughts of harming yourself and/or your baby. Pain Management:  
 
Pain Management:  
Take Acetaminophen (Tylenol) or Ibuprofen (Advil, Motrin), as directed for pain. Use a warm Sitz bath 3 times daily to relieve episiotomy or hemorrhoidal discomfort. Heating pad to  incision as needed. For hemorrhoidal discomfort, use Tucks and Anusol cream as needed and directed. Follow-Up Care: These are general instructions for a healthy lifestyle: No smoking/ No tobacco products/ Avoid exposure to second hand smoke Surgeon General's Warning:  Quitting smoking now greatly reduces serious risk to your health. Obesity, smoking, and sedentary lifestyle greatly increases your risk for illness A healthy diet, regular physical exercise & weight monitoring are important for maintaining a healthy lifestyle Recognize signs and symptoms of STROKE: 
 
F-face looks uneven A-arms unable to move or move unevenly S-speech slurred or non-existent T-time-call 911 as soon as signs and symptoms begin-DO NOT go Back to bed or wait to see if you get better-TIME IS BRAIN. [facility] Activation Thank you for requesting access to [facility]. Please follow the instructions below to securely access and download your online medical record. [facility] allows you to send messages to your doctor, view your test results, renew your prescriptions, schedule appointments, and more. How Do I Sign Up? 1. In your internet browser, go to [email or web address] [email or web address] Spreaker 
2. Click on the First Time User? Click Here link in the Sign In box. You will be redirect to the New Member Sign Up page. 3. Enter your [employer] Access Code exactly as it appears below. You will not need to use this code after youve completed the sign-up process. If you do not sign up before the expiration date, you must request a new code. [employer] Access Code: [email or web address] Expires: 4/15/2018 10:57 AM (This is the date your [employer] access code will ) 4. Enter the last four digits of your Social Security Number (xxxx) and Date of Birth (mm/dd/yyyy) as indicated and click Submit. You will be taken to the next sign-up page. 5. Create a L[employer]ch ID. This will be your [employer] login ID and cannot be changed, so think of one that is secure and easy to remember. 6. Create a [employer] password. You can change your password at any time. 7. Enter your Password Reset Question and Answer. This can be used at a later time if you forget your password. 8. Enter your e-mail address. You will receive e-mail notification when new information is available in [address] 9. Click Sign Up. You can now view and download portions of your medical record. 10. Click the Download Summary menu link to download a portable copy of your medical information. Additional Information If you have questions, please visit the Frequently Asked Questions section of the L[employer] website at https://Pretty Padded Room. [employer] [email or web address]/. Remember, [employer] is NOT to be used for urgent needs. For medical emergencies, dial 911. Learning About Starting to Breastfeed Planning ahead Before your baby is born, plan ahead. Learn all you can about breastfeeding. This helps make breastfeeding easier. · Early in your pregnancy, talk to your doctor or midwife about breastfeeding. · Learn the basics before your baby is born.  The staff at hospitals and birthing centers can help you find a lactation specialist. This person is often a nurse who has been trained to teach and advise women about breastfeeding. Or you can take a breastfeeding class. · Plan ahead for times when you will need help after your baby is born. Many women get help from friends and family. Some join a support group to talk to other moms who breastfeed. · Buy the equipment you'll need. Examples are breast pads, nipple cream, extra pillows, and nursing bras. Find out about breast pumps too. Getting help from your hospital or birthing center It's important to have support from the doctors, nurses, and hospital staff who care for you and your baby. Before it's time for you to give birth, ask about the breastfeeding policies at your hospital or birthing center. Look for a hospital or birthing center that has policies for: · \"Rooming in. \" This policy encourages you to have your baby in the room with you. It can allow you to breastfeed more often. · Supplemental feedings. Tell the staff that your baby is to get only your breast milk from birth. If staff feed your baby water, sugar solution, or formula right after birth without a medical reason, it may make it harder for you to breastfeed. · Pacifiers or artificial nipples. Staff should not give your  these items without your permission. They may interfere with breastfeeding. · Follow-up. Find out if your hospital can help you with breastfeeding issues after you go home. See if you can get information on support groups or other contacts. They might help if you need help setting up and staying with your breastfeeding routine. Your first feeding It's best to start breastfeeding within 1 hour of birth. For each feeding, you go through these basic steps: · Get ready for the feeding. Be calm and relaxed, and try not to be distracted. Get some water or juice for yourself.  Use two or three pillows to help support your baby while he or she is nursing. · Find a breastfeeding position that is comfortable for you and your baby. Examples are the cradle and the football positions. Make sure the baby's head and chest are lined up straight and facing your breast. It's best to switch which breast you start with each time. · Get the baby latched on well. Your baby's mouth needs to be wide open, like a yawn, so you may need to gently touch the middle of your baby's lower lip. When your baby's mouth is open wide, quickly bring the baby onto your nipple and areola. The areola is the dark [place] around your nipple. · Provide a complete feeding. Let your baby nurse for at least 15 minutes. Be sure to burp your baby after each breast. 
In the first days after birth, your breasts make a thick, yellow liquid called colostrum. This liquid gives your baby nutrients and antibodies against infection. It is all that babies need at first. Your breasts will fill with milk a few days after the birth. Talk to your doctor, midwife, or lactation specialist right away if you are having problems and aren't sure what to do. How often to breastfeed Plan to breastfeed your baby on demand rather than setting a strict schedule. For the first few days, be prepared to breastfeed every 1 to 3 hours. That often works out to about 8 to 12 times in a 24-hour period. Wake a sleeping baby to feed, if you need to. If you breastfeed more often, it will help your breasts to produce more milk. After you go home After you're home, don't be afraid to call your doctor, midwife, or lactation specialist with questions. That's true even if you don't know what's bothering you. They are used to parents of newborns calling. They can help you figure out if there is a problem, and if so, how to fix it. Plan for times when you will be apart from your baby. Use a breast pump to collect breast milk ahead of time.  You can store milk in the refrigerator or freezer. Then it's ready when someone else will be taking care of your baby. Breastfeeding is a learned skill that gets easier over time. You are more likely to succeed if you plan ahead, learn the basic techniques, and know where to get help and support. Where can you learn more? Go to http://henrique-cesar.info/. Enter B269 in the search box to learn more about \"Learning About Starting to Breastfeed. \" Current as of: 2017 Content Version: 11.4 © 5441-2694 [employer]. Care instructions adapted under license by [facility] (which disclaims liability or warranty for this information). If you have questions about a medical condition or this instruction, always ask your healthcare professional. [address] any warranty or liability for your use of this information. Medications:  
*Ibuprofen (over the counter) up to 600 mg every 6 hours as needed for pain or cramping *Continue Prenatal vitamins and DHA supplements while breastfeeding *You may use a stool softener if needed until comfortable with bowel movements, may take up to 100 mg of docusate sodium (Colace- over the counter) twice daily Appointments: *Follow-up with CNM in 2 to 6 weeks as directed Your Care Instructions Congratulations on the birth of your baby. Like pregnancy, the  period can be a time of excitement, asya, and exhaustion. You may look at your wondrous little baby and feel happy. You may also be overwhelmed by your new sleep hours and new responsibilities. At first, babies often sleep during the days and are awake at night. They do not have a pattern or routine. They may make sudden gasps, jerk themselves awake, or look like they have crossed eyes. These are all normal, and they may even make you smile. In these first weeks after delivery, try to take good care of yourself.  It may take 4 to 6 weeks to feel like yourself again, and possibly longer if you had a  birth. You will likely feel very tired for several weeks. Your days will be full of ups and downs, but lots of asya as well. Follow-up care is a key part of your treatment and safety. Be sure to make and go to all appointments, and call your midwife if you are having problems. It's also a good idea to know your test results and keep a list of the medicines you take. How can you care for yourself at home? Take care of your body after delivery · Use pads instead of tampons for the bloody flow that may last as long as 2 weeks. · Ease cramps with ibuprofen (Advil). · Ease soreness of hemorrhoids and the area between your vagina and rectum with ice compresses or witch hazel pads. · Ease constipation by drinking lots of fluid and eating high-fiber foods. Ask your midwife about over-the-counter stool softeners. · Cleanse yourself with a gentle squeeze of warm water from a bottle instead of wiping with toilet paper. · Take a sitz bath in warm water several times a day. · Wear a good nursing bra. Ease sore and swollen breasts with warm, wet washcloths. · If you are not breast-feeding, use ice rather than heat for breast soreness. · Your period may not start for several months if you are breast-feeding. You may bleed more, and longer at first, than you did before you got pregnant. · Wait until you are healed (about 4 to 6 weeks) before you have sexual intercourse. · Try not to travel with your baby for 5 or 6 weeks. If you take a long car trip, make frequent stops to walk around and stretch. Avoid exhaustion · Rest every day. Try to nap when your baby naps. · Ask another adult to be with you for a few days after delivery. · Plan for  if you have other children. · Stay flexible so you can eat at odd hours and sleep when you need to. Both you and your baby are making new schedules. · Plan small trips to get out of the house. Change can make you feel less tired. · Ask for help with housework, cooking, and shopping. Remind yourself that your job is to care for your baby. Know about help for postpartum depression · \"Baby blues are common for the first 1 to 2 weeks after birth. You may cry or feel sad or irritable for no reason. · Rest whenever you can. Being tired makes it harder to handle your emotions. · Go for walks with your baby. · Talk to your partner, friends, and family about your feelings. · If your symptoms last for more than a few weeks, or if you feel very depressed, ask your doctor for help. · Postpartum depression can be treated. Support groups and counseling can help. Sometimes medicine can also help. Stay healthy · Eat healthy foods so you have more energy, make good breast milk, and lose extra baby pounds. · If you breast-feed, avoid alcohol and drugs. Stay smoke-free. If you quit during pregnancy, congratulations. · Start daily exercise after 4 to 6 weeks, but rest when you feel tired. · Learn exercises to tone your belly. Do Kegel exercises to regain strength in your pelvic muscles. You can do these exercises while you stand or sit. ¨ Squeeze the same muscles you would use to stop your urine. Your belly and rear end (buttocks) should not move. ¨ Hold the squeeze for 3 seconds, then relax for 3 seconds. ¨ Repeat the exercise 10 to 15 times for each session. Do three or more sessions each day. · Find a class for new mothers and new babies that has an exercise time. · If you had a  birth, give yourself a bit more time before you exercise, and be careful. When should you call for help? Call 911 anytime you think you may need emergency care. For example, call if: 
· You have sudden, severe pain in your belly. · You passed out (lost consciousness). Call your provider now or seek immediate medical care if: · You have severe vaginal bleeding. You are passing blood clots and soaking through a pad each hour for 2 or more hours. · Your vaginal bleeding seems to be getting heavier or is still bright red 4 days after delivery, or you pass blood clots larger than the size of a golf ball. · You are dizzy or lightheaded, or you feel like you may faint. · You are vomiting or cannot keep fluids down. · You have a fever. · You have new or more belly pain. · You pass tissue (not just blood). · Your breast or breasts have hard, red, or tender areas. · You have an urgent or frequent need to urinate, along with a burning feeling. · You have severe pain, tenderness, or swelling in your vagina or the area between your rectum and vagina. · You have severe pains in your chest, belly, back, or legs. · You have feelings of severe despair or great anxiety. · Your baby is unusually cranky or is sleeping too much. · Your baby's eyes are red or have discharge. · Your baby has white patches on the roof and sides of the mouth or tongue. · Your baby's umbilical cord is foul-smelling, swollen, red, or leaking pus. · There is blood or mucus in your baby's bowel movements. · Your baby has fewer than 6 wet diapers a day. · Your baby does not want to eat, or your baby is throwing up with every feeding. · Your baby has trouble breathing. · Your baby has a rectal temperature of 100.4°F or more, or an underarm temperature over 99.4°F. 
· You baby has a low temperature less than 97.5°F rectal, or less than 97. 0°F underarm. · Your baby's skin looks yellow. Watch closely for changes in your health, and be sure to contact your doctor if you have any problems. Where can you learn more? Go to GroupZoom.be Enter A461 in the search box to learn more about \"After Your Delivery (the Postpartum Period): After Your Visit. \"  
© 3531-4978 Healthwise, Incorporated.  Care instructions adapted under license by [facility] (which disclaims liability or warranty for this information). This care instruction is for use with your licensed healthcare professional. If you have questions about a medical condition or this instruction, always ask your healthcare professional. Mike Potts any warranty or liability for your use of this information. Content Version: 8.8.13798; Last Revised: July 8, 2010 Depression After Childbirth: After Your Birth Many women get the \"baby blues\" during the first few days after childbirth. They may lose sleep, feel irritable, cry easily, and feel happy one minute and sad the next. Hormone changes are one cause of these emotional changes. Also, the demands of a new baby, coupled with visits from relatives or other family needs, add to a mother's stress. The \"baby blues\" usually peak around the fourth day and then ease up in less than 2 weeks. If your moodiness or anxiety lasts for more than 2 weeks, or if you feel like life is not worth living, you may have postpartum depression. This is different for each mother. Some mothers with serious depression may worry intensely about their infant's well-being, while others may feel distant from their child. Some mothers might even feel that they might harm their baby. A mother may have signs of paranoia, wondering if someone is watching her. Depression is not a sign of weakness. It is a medical condition that requires treatment. Medicine and counseling are often effective at reducing depression. Talk to your midwife about taking antidepressant medicine while breast-feeding. Follow-up care is a key part of your treatment and safety. Be sure to make and go to all appointments, and call your midwife if you are having problems. It's also a good idea to know your test results and keep a list of the medicines you take. How can you care for yourself at home? · Take your medicines exactly as prescribed.  Call your provider if you think you are having a problem with your medicine. · Eat a balanced diet, so that you can keep up your energy. · Get regular daily exercise, such as walks, to help improve your mood. · Get as much sunlight as possible. Keep your shades and curtains open, and get outside as much as you can. · Avoid using alcohol or other substances to feel better. · Get as much rest and sleep as possible, and avoid doing too much. Being too tired can increase depression. · Play stimulating music throughout your day and soothing music at night. · Schedule outings and visits with friends and family. Ask them to call you regularly, so that you do not feel alone. · Ask for help with preparing food and other daily tasks. Family and friends are often happy to help a mother with a . · Be honest with yourself and those who care about you. Tell them about your struggle. · Join a support group of new mothers. No one can better understand the challenges of caring for a  than other new mothers. When should you call for help? Call 911 anytime you think you may need emergency care. For example, call if: 
· You feel you cannot stop from hurting yourself or someone else. Call your provider now or seek immediate medical care if: 
· You are having trouble caring for yourself or your baby. · You have signs of paranoia that can occur with postpartum depression. You fear that someone is watching you, stealing from you, or reading your mind. · You hear voices. · You have symptoms of postpartum depression, such as: ¨ Sleeplessness. ¨ Anxiety. ¨ Hopelessness. ¨ Irritability. ¨ Poor concentration. · Someone you know has depression and: 
¨ Starts to give away his or her possessions. ¨ Uses illegal drugs or drinks alcohol heavily. ¨ Talks or writes about death, including writing suicide notes and talking about guns, knives, or pills. ¨ Starts to spend a lot of time alone. ¨ Acts very aggressively or suddenly appears calm. Watch closely for changes in your health, and be sure to contact your doctor if you have any problems. Where can you learn more? Go to Sport Ngin.be Enter T064 in the search box to learn more about \"Depression After Childbirth: After Your Visit. \"  
© 2300-4692 Healthwise, Incorporated. Care instructions adapted under license by [facility] (which disclaims liability or warranty for this information). This care instruction is for use with your licensed healthcare professional. If you have questions about a medical condition or this instruction, always ask your healthcare professional. [employer] any warranty or liability for your use of this information. Content Version: 8.8.75387; Last Revised: April 27, 2009 Introducing [facility] SERVICES! [facility] introduces [facility] patient portal. Now you can access parts of your medical record, email your doctor's office, and request medication refills online. 1. In your internet browser, go to https://[employer]. [employer]ZeroPoint [employer] Tech 2. Click on the First Time User? Click Here link in the Sign In box. You will see the New Member Sign Up page. 3. Enter your [facility] Access Code exactly as it appears below. You will not need to use this code after youve completed the sign-up process. If you do not sign up before the expiration date, you must request a new code. · [facility] Access Code: [email or web address] Expires: 4/15/2018 10:57 AM 
 
4. Enter the last four digits of your Social Security Number (xxxx) and Date of Birth (mm/dd/yyyy) as indicated and click Submit. You will be taken to the next sign-up page. 5. Create a [facility] ID. This will be your [facility] login ID and cannot be changed, so think of one that is secure and easy to remember. 6. Create a [facility] password. You can change your password at any time. 7. Enter your Password Reset Question and Answer. This can be used at a later time if you forget your password. 8. Enter your e-mail address. You will receive e-mail notification when new information is available in [address] 9. Click Sign Up. You can now view and download portions of your medical record. 10. Click the Download Summary menu link to download a portable copy of your medical information. If you have questions, please visit the Frequently Asked Questions section of the [employer] website. Remember, [employer] is NOT to be used for urgent needs. For medical emergencies, dial 911. Now available from your iPhone and Android! Introducing Filemon Esquivel As a [employer] patient, I wanted to make you aware of our electronic visit tool called Filemon Esquivel. [employer] 24/7 allows you to connect within minutes with a medical provider 24 hours a day, seven days a week via a mobile device or tablet or logging into a secure website from your computer. You can access Filemon Esquivel from anywhere in the [place]. A virtual visit might be right for you when you have a simple condition and feel like you just dont want to get out of bed, or cant get away from work for an appointment, when your regular [employer] provider is not available (evenings, weekends or holidays), or when youre out of town and need minor care. Electronic visits cost only $49 and if the [employer] 24/7 provider determines a prescription is needed to treat your condition, one can be electronically transmitted to a nearby pharmacy*. Please take a moment to enroll today if you have not already done so. The enrollment process is free and takes just a few minutes. To enroll, please download the [employer] 24/7 ciara to your tablet or phone, or visit [email or web address] to enroll on your computer.    
And, as an [address] [place] [address] patient with a [employer] account, the results of your visits will be scanned into your electronic medical record and your primary care provider will be able to view the scanned results. We urge you to continue to see your regular [facility] provider for your ongoing medical care. And while your primary care provider may not be the one available when you seek a [employer] virtual visit, the peace of mind you get from getting a real diagnosis real time can be priceless. For more information on [employer], view our Frequently Asked Questions (FAQs) at [email or web address]. Sincerely, 
 
Yessi Buchanan MD 
Chief Medical Officer [address] *:  certain medications cannot be prescribed via [employer] Providers Seen During Your Hospitalization Provider Specialty Primary office phone Raghav Lynn MD Obstetrics & Gynecology [phone] Your Primary Care Physician (PCP) Primary Care Physician Office Phone Office Fax NONE ** None ** ** None ** You are allergic to the following Allergen Reactions Bee Sting (Sting, Bee) Swelling Recent Documentation Height Weight Breastfeeding? BMI OB Status Smoking Status 1.575 m 80.7 kg Unknown 32.56 kg/m2 Recent pregnancy Former Smoker Emergency Contacts Name Discharge Info Relation Home Work Mobile Chadwick Begum 27 CAREGIVER [3] Boyfriend [17]   [phone] Pik 18  Other Relative [6] [phone] Patient Belongings The following personal items are in your possession at time of discharge: 
  Dental Appliances: None  Visual Aid: Glasses      Home Medications: None   Jewelry: None  Clothing: At bedside    Other Valuables: None Please provide this summary of care documentation to your next provider. Signatures-by signing, you are acknowledging that this After Visit Summary has been reviewed with you and you have received a copy. Patient Signature:  ____________________________________________________________ Date:  ____________________________________________________________  
  
Klarissa Mckeon Provider Signature:  ____________________________________________________________ Date:  ____________________________________________________________

## 2018-04-12 NOTE — LACTATION NOTE
This note was copied from a baby's chart. Discussed with mother her plan for feeding. Reviewed the benefits of exclusive breast milk feeding during the hospital stay. Informed her of the risks of using formula to supplement in the first few days of life as well as the benefits of successful breast milk feeding; referred her to the Breastfeeding booklet about this information. She acknowledges understanding of information reviewed and states that it is her plan to breastfeed her infant. Will support her choice and offer additional information as needed. Reviewed breastfeeding basics:  How milk is made and normal  breastfeeding behaviors discussed. Supply and demand,  stomach size, early feeding cues, skin to skin bonding with comfortable positioning and baby led latch-on reviewed. How to identify signs of successful breastfeeding sessions reviewed; education on assymetrical latch, signs of effective latching vs shallow, in-effective latching, normal  feeding frequency and duration and expected infant output discussed. Normal course of breastfeeding discussed including the AAP's recommendation that children receive exclusive breast milk feedings for the first six months of life with breast milk feedings to continue through the first year of life and/or beyond as complimentary table foods are added. Breastfeeding Booklet and Warm line information provided with discussion. Discussed typical  weight loss and the importance of pediatrician appointment within 24-48 hours of discharge, at 2 weeks of life and normalcy of requesting pediatric weight checks as needed in between visits. Pt will successfully establish breastfeeding by feeding in response to early feeding cues   or wake every 3h, will obtain deep latch, and will keep log of feedings/output. Taught to BF at hunger cues and or q 2-3 hrs and to offer 10-20 drops of hand expressed colostrum at any non-feeds.       Breast Assessment  Left Breast: Large, Extra large  Left Nipple: Everted, Intact  Right Breast: Large, Extra large  Right Nipple: Everted, Intact  Breast- Feeding Assessment  Attends Breast-Feeding Classes: No  Breast-Feeding Experience: Yes (over 1 year with other 2 children)  Breast Trauma/Surgery: No  Type/Quality: Good  Lactation Consultant Visits  Breast-Feedings: Good   Mother/Infant Observation  Mother Observation: Alignment, Breast comfortable, Close hold  Infant Observation: Latches nipple and aereolae, Lips flanged, lower, Lips flanged, upper, Opens mouth  LATCH Documentation  Latch: Grasps breast, tongue down, lips flanged, rhythmic sucking  Audible Swallowing: A few with stimulation  Type of Nipple: Everted (after stimulation)  Comfort (Breast/Nipple): Soft/non-tender  Hold (Positioning): No assist from staff, mother able to position/hold infant  LATCH Score: 9  Hand Expression Education:  Mom taught how to manually hand express her colostrum. Emphasized the importance of providing infant with valuable colostrum as infant rests skin to skin at breast.  Aware to avoid extended periods of non-feeding. Aware to offer 10-20+ drops of colostrum every 2-3 hours until infant is latching and nursing effectively. Taught the rationale behind this low tech but highly effective evidence based practice.

## 2018-04-12 NOTE — PROGRESS NOTES
CNM called to clients room for moderate vaginal bleeding 3 hours post delivery. Client has been up to void multiple times and fundus firm per RN. However, moderate bleeding noted on chux pads. Discussed bleeding with client and option of 800mcg cytotec rectally to help prevent further bleeding. Client agreeable. 800mcg cytotec placed rectally per CNM, client tolerated well. RN to notify CNM if bleeding continues.

## 2018-04-12 NOTE — PROGRESS NOTES
Pt progressed quickly to C/C/0 at 1216 prior to epidural placement . She was spontaneously pushing prior to exam.  She was encouraged to push with contractions and Zachary Victor, [employer] student, prepared for delivery. Delivery of infant head noted at [id number]. Infant restituted and shoulder failed to deliver with gentle traction. CNM stepped in and hooked posterior axilla and the rest of the baby delivered easily. Total time between head and body 60 secs per RN. Delivery time 1218. Infant initially placed on chest and then cord clamped and cut by FOB. Infant then moved to warmer for further stimulation and O2 support per nursery staff. Apgars 6,8,9 and weight is pending. Placenta spontaneous and intact at 1226.  3 vessel cord noted. Perineum inspected and found to be intact. EBL 450cc. Mother and baby are stable in LDR and baby is nursing well. Infant  arms are both moving well.

## 2018-04-12 NOTE — IP AVS SNAPSHOT
[address] 
[phone] Patient: Leo Pressley MRN: [id number] SLU:[id number][birth year]9475 A check bernabe indicates which time of day the medication should be taken. My Medications CONTINUE taking these medications Instructions Each Dose to Equal  
 Morning Noon Evening Bedtime PRENATAL PO Your last dose was: Your next dose is: Take  by mouth.

## 2018-04-13 LAB
ERYTHROCYTE [DISTWIDTH] IN BLOOD BY AUTOMATED COUNT: 14.4 % (ref 11.5–14.5)
HCT VFR BLD AUTO: 32.7 % (ref 35–47)
HGB BLD-MCNC: 11.1 G/DL (ref 11.5–16)
MCH RBC QN AUTO: 33.5 PG (ref 26–34)
MCHC RBC AUTO-ENTMCNC: 33.9 G/DL (ref 30–36.5)
MCV RBC AUTO: 98.8 FL (ref 80–99)
NRBC # BLD: 0 K/UL (ref 0–0.01)
NRBC BLD-RTO: 0 PER 100 WBC
PLATELET # BLD AUTO: 304 K/UL (ref 150–400)
PMV BLD AUTO: 10.6 FL (ref 8.9–12.9)
RBC # BLD AUTO: 3.31 M/UL (ref 3.8–5.2)
WBC # BLD AUTO: 25.4 K/UL (ref 3.6–11)

## 2018-04-13 PROCEDURE — 85027 COMPLETE CBC AUTOMATED: CPT | Performed by: ADVANCED PRACTICE MIDWIFE

## 2018-04-13 PROCEDURE — 65270000029 HC RM PRIVATE

## 2018-04-13 PROCEDURE — 36415 COLL VENOUS BLD VENIPUNCTURE: CPT | Performed by: ADVANCED PRACTICE MIDWIFE

## 2018-04-13 PROCEDURE — 74011250637 HC RX REV CODE- 250/637: Performed by: ADVANCED PRACTICE MIDWIFE

## 2018-04-13 RX ADMIN — IBUPROFEN 800 MG: 800 TABLET ORAL at 16:10

## 2018-04-13 RX ADMIN — IBUPROFEN 800 MG: 800 TABLET ORAL at 07:32

## 2018-04-13 NOTE — LACTATION NOTE
This note was copied from a baby's chart. Biological Nurturing breastfeeding principles taught. How Biological Nurturing (BN)  promotes optimal breastfeeding (BF) sessions discussed. Mother encouraged to seek comfortable semi-reclining breastfeeding positions. Infant placed frontally along maternal contour. Primitive innate feeding reflexes/behaviors of the  discussed. BN tips and techniques shared; assisted with comfortable breastfeeding positioning. Pt will successfully establish breastfeeding by feeding in response to early feeding cues   or wake every 3h, will obtain deep latch, and will keep log of feedings/output. Taught to BF at hunger cues and or q 2-3 hrs and to offer 10-20 drops of hand expressed colostrum at any non-feeds.       Breast Assessment  Left Breast: Large, Extra large  Left Nipple: Everted, Intact  Right Breast: Large, Extra large  Right Nipple: Everted, Tender  Breast- Feeding Assessment  Attends Breast-Feeding Classes: No  Breast-Feeding Experience: Yes  Breast Trauma/Surgery: No  Type/Quality: Good  Lactation Consultant Visits  Breast-Feedings: Good   Mother/Infant Observation  Mother Observation: Alignment, Breast comfortable, Close hold  Infant Observation: Latches nipple and aereolae, Lips flanged, lower, Lips flanged, upper, Opens mouth  LATCH Documentation  Latch: Grasps breast, tongue down, lips flanged, rhythmic sucking  Audible Swallowing: A few with stimulation  Type of Nipple: Everted (after stimulation)  Comfort (Breast/Nipple): Filling, red/small blisters/bruises, mild/mod discomfort  Hold (Positioning): Full assist, teach one side, mother does other, staff holds  LATCH Score: 7   Care for sore/tender nipples discussed:  ways to improve positioning and latch practiced and discussed, hand express colostrum after feedings and let air dry, light application of lanolin, hydrogel pads, seek comfortable laid back feeding position, start feedings on least sore side first.

## 2018-04-13 NOTE — PROGRESS NOTES
Bedside and Verbal shift change report given to GINO Nayak RN (oncoming nurse) by Emmanuel Peters. Mandie France RN (offgoing nurse). Report included the following information SBAR, Kardex, Intake/Output and MAR.

## 2018-04-13 NOTE — PROGRESS NOTES
CNMPostpartumNoteDay1  S: Patient ambulating and voiding without difficulty. Patient happy with birth experience. Understands why the baby needed help with delivering shoulders, no questions. Breastfeeding well, although thinks baby may have a shallow latch. No complaints. O: Vital signs stable, Heart RRR without murmur, Lungs CTA bilaterally, FF below umbilicus, lochia rubra light or moderate, breasts soft, nipples intact, no edema, no s/s of DVT. A: Postpartum Day 1  Breastfeeding- Lactation consultation today to evaluate latch. Blood type A positive/Rubella immune/GBS positive  Had TDAP with this pregnancy    P: Continue current postpartum orders  Lactation consult   Anticipate discharge tomorrow  If baby able to D/C today will do discharge instructions with client and discharge today.  Otherwise continue with plan to discharge home in AM.

## 2018-04-14 VITALS
HEART RATE: 96 BPM | SYSTOLIC BLOOD PRESSURE: 129 MMHG | RESPIRATION RATE: 16 BRPM | BODY MASS INDEX: 32.76 KG/M2 | OXYGEN SATURATION: 94 % | WEIGHT: 178 LBS | TEMPERATURE: 97.9 F | DIASTOLIC BLOOD PRESSURE: 66 MMHG | HEIGHT: 62 IN

## 2018-04-14 PROCEDURE — 74011250637 HC RX REV CODE- 250/637: Performed by: ADVANCED PRACTICE MIDWIFE

## 2018-04-14 RX ADMIN — IBUPROFEN 800 MG: 800 TABLET ORAL at 00:21

## 2018-04-14 RX ADMIN — Medication 1 TABLET: at 08:43

## 2018-04-14 RX ADMIN — IBUPROFEN 800 MG: 800 TABLET ORAL at 08:43

## 2018-04-14 RX ADMIN — IBUPROFEN 800 MG: 800 TABLET ORAL at 17:31

## 2018-04-14 NOTE — LACTATION NOTE
This note was copied from a baby's chart. Went in to visit with mother, NP requested a pump be set up in case mom would like to pump and store milk as milk is in. Guidelines for pumping, milk collection and storage, proper cleaning of pump parts all reviewed. Differences between hospital grade rental pumps vs store bought double electric/hand pumps discussed. Set up pumping with double electric set up. Assisted with pump session. List of area pump rental locations and lactation support services reviewed. Mother does not want to feed baby expressed milk in a bottle at this time, states she wants to only put baby to breast at this time. Baby is having emesis after feedings each feed, mother states it is mostly after she places baby in crib flat. NP Shital Nichols at the bedside at this time to discuss plan with baby having frequent emesis.

## 2018-04-14 NOTE — DISCHARGE SUMMARY
Post-Partum Day Number 2 Progress/Discharge Note    Patient doing well post-partum without significant complaint. Baby nursing well. Voiding without difficulty, normal lochia, positive flatus. Will have help at home. Vitals:  Patient Vitals for the past 8 hrs:   BP Temp Pulse Resp   18 0546 129/66 97.9 °F (36.6 °C) 96 16     Temp (24hrs), Av.9 °F (36.6 °C), Min:97.8 °F (36.6 °C), Max:98 °F (36.7 °C)      Vital signs stable, afebrile. Exam:  Patient without distress. Abdomen soft, fundus firm at level of umbilicus, nontender               Breasts: soft  Nipples daniel, intact               Perineum with normal lochia noted. Lower extremities are negative for swelling, cords or tenderness. Labs: No results found for this or any previous visit (from the past 24 hour(s)). Assessment and Plan:  Patient appears to be having uncomplicated post-partum course. Continue routine perineal care and maternal education. Plan discharge for today with follow up in our office in 2 weeks.

## 2018-04-14 NOTE — DISCHARGE INSTRUCTIONS
POST DELIVERY DISCHARGE INSTRUCTIONS    Name: Renate Leroy  YOB: 1990  Primary Diagnosis: Active Problems:    Pregnancy (3/13/2018)        General:     Diet/Diet Restrictions:  Eight 8-ounce glasses of fluid daily (water, juices); avoid excessive caffeine intake. Meals/snacks as desired which are high in fiber and carbohydrates and low in fat and cholesterol. Physical Activity / Restrictions / Safety:     Avoid heavy lifting, no more that 8 lbs. For 2-3 weeks; limit use of stairs to 2 times daily for the first week home. No driving for one week. Avoid intercourse 4-6 weeks, no douching or tampon use. Check with obstetrician before starting or resuming an exercise program.         Discharge Instructions/Special Treatment/Home Care Needs:     Continue prenatal vitamins. Continue to use squirt bottle with warm water on your episiotomy after each bathroom use until bleeding stops. If steri-strips applied to your incision, remove in 7-10 days. Call your doctor for the following:     Fever over 101 degrees by mouth. Vaginal bleeding heavier than a normal menstrual period or clot larger than a golf ball. Red streaks or increased swelling of legs, painful red streaks on your breast.  Painful urination, constipation and increased pain or swelling or discharge with your incision. If you feel extremely anxious or overwhelmed. If you have thoughts of harming yourself and/or your baby. Pain Management:     Pain Management:   Take Acetaminophen (Tylenol) or Ibuprofen (Advil, Motrin), as directed for pain. Use a warm Sitz bath 3 times daily to relieve episiotomy or hemorrhoidal discomfort. Heating pad to  incision as needed. For hemorrhoidal discomfort, use Tucks and Anusol cream as needed and directed. Follow-Up Care:      These are general instructions for a healthy lifestyle:    No smoking/ No tobacco products/ Avoid exposure to second hand smoke    Surgeon Luan Renee Warning:  Quitting smoking now greatly reduces serious risk to your health. Obesity, smoking, and sedentary lifestyle greatly increases your risk for illness    A healthy diet, regular physical exercise & weight monitoring are important for maintaining a healthy lifestyle    Recognize signs and symptoms of STROKE:    F-face looks uneven    A-arms unable to move or move unevenly    S-speech slurred or non-existent    T-time-call 911 as soon as signs and symptoms begin-DO NOT go       Back to bed or wait to see if you get better-TIME IS BRAIN. MyChart Activation    Thank you for requesting access to [employer]. Please follow the instructions below to securely access and download your online medical record. Xtract allows you to send messages to your doctor, view your test results, renew your prescriptions, schedule appointments, and more. How Do I Sign Up? 1. In your internet browser, go to [email or web address]  2. Click on the First Time User? Click Here link in the Sign In box. You will be redirect to the New Member Sign Up page. 3. Enter your Xtract Access Code exactly as it appears below. You will not need to use this code after youve completed the sign-up process. If you do not sign up before the expiration date, you must request a new code. Xtract Access Code: [email or web address]  Expires: 4/15/2018 10:57 AM (This is the date your Xtract access code will )    4. Enter the last four digits of your Social Security Number (xxxx) and Date of Birth (mm/dd/yyyy) as indicated and click Submit. You will be taken to the next sign-up page. 5. Create a Xtract ID. This will be your Xtract login ID and cannot be changed, so think of one that is secure and easy to remember. 6. Create a Xtract password. You can change your password at any time. 7. Enter your Password Reset Question and Answer. This can be used at a later time if you forget your password. 8. Enter your e-mail address.  You will receive e-mail notification when new information is available in [address] 9. Click Sign Up. You can now view and download portions of your medical record. 10. Click the Download Summary menu link to download a portable copy of your medical information. Additional Information    If you have questions, please visit the Frequently Asked Questions section of the [employer] website at [email or web address] Remember, [employer] is NOT to be used for urgent needs. For medical emergencies, dial 911. Learning About Starting to Breastfeed  Planning ahead    Before your baby is born, plan ahead. Learn all you can about breastfeeding. This helps make breastfeeding easier. · Early in your pregnancy, talk to your doctor or midwife about breastfeeding. · Learn the basics before your baby is born. The staff at hospitals and birthing centers can help you find a lactation specialist. This person is often a nurse who has been trained to teach and advise women about breastfeeding. Or you can take a breastfeeding class. · Plan ahead for times when you will need help after your baby is born. Many women get help from friends and family. Some join a support group to talk to other moms who breastfeed. · Buy the equipment you'll need. Examples are breast pads, nipple cream, extra pillows, and nursing bras. Find out about breast pumps too. Getting help from your hospital or birthing center  It's important to have support from the doctors, nurses, and hospital staff who care for you and your baby. Before it's time for you to give birth, ask about the breastfeeding policies at your hospital or birthing center. Look for a hospital or birthing center that has policies for:  · \"Rooming in. \" This policy encourages you to have your baby in the room with you. It can allow you to breastfeed more often. · Supplemental feedings. Tell the staff that your baby is to get only your breast milk from birth.  If staff feed your baby water, sugar solution, or formula right after birth without a medical reason, it may make it harder for you to breastfeed. · Pacifiers or artificial nipples. Staff should not give your  these items without your permission. They may interfere with breastfeeding. · Follow-up. Find out if your hospital can help you with breastfeeding issues after you go home. See if you can get information on support groups or other contacts. They might help if you need help setting up and staying with your breastfeeding routine. Your first feeding  It's best to start breastfeeding within 1 hour of birth. For each feeding, you go through these basic steps:  · Get ready for the feeding. Be calm and relaxed, and try not to be distracted. Get some water or juice for yourself. Use two or three pillows to help support your baby while he or she is nursing. · Find a breastfeeding position that is comfortable for you and your baby. Examples are the cradle and the football positions. Make sure the baby's head and chest are lined up straight and facing your breast. It's best to switch which breast you start with each time. · Get the baby latched on well. Your baby's mouth needs to be wide open, like a yawn, so you may need to gently touch the middle of your baby's lower lip. When your baby's mouth is open wide, quickly bring the baby onto your nipple and areola. The areola is the dark [place] around your nipple. · Provide a complete feeding. Let your baby nurse for at least 15 minutes. Be sure to burp your baby after each breast.  In the first days after birth, your breasts make a thick, yellow liquid called colostrum. This liquid gives your baby nutrients and antibodies against infection. It is all that babies need at first. Your breasts will fill with milk a few days after the birth. Talk to your doctor, midwife, or lactation specialist right away if you are having problems and aren't sure what to do.   How often to breastfeed  Plan to breastfeed your baby on demand rather than setting a strict schedule. For the first few days, be prepared to breastfeed every 1 to 3 hours. That often works out to about 8 to 12 times in a 24-hour period. Wake a sleeping baby to feed, if you need to. If you breastfeed more often, it will help your breasts to produce more milk. After you go home  After you're home, don't be afraid to call your doctor, midwife, or lactation specialist with questions. That's true even if you don't know what's bothering you. They are used to parents of newborns calling. They can help you figure out if there is a problem, and if so, how to fix it. Plan for times when you will be apart from your baby. Use a breast pump to collect breast milk ahead of time. You can store milk in the refrigerator or freezer. Then it's ready when someone else will be taking care of your baby. Breastfeeding is a learned skill that gets easier over time. You are more likely to succeed if you plan ahead, learn the basic techniques, and know where to get help and support. Where can you learn more? Go to http://henrique-cesar.info/. Enter U185 in the search box to learn more about \"Learning About Starting to Breastfeed. \"  Current as of: March 16, 2017  Content Version: 11.4  © 2086-1168 [employer]. Care instructions adapted under license by [employer] (which disclaims liability or warranty for this information). If you have questions about a medical condition or this instruction, always ask your healthcare professional. [place] [address] any warranty or liability for your use of this information.   Medications:   *Ibuprofen (over the counter) up to 600 mg every 6 hours as needed for pain or cramping  *Continue Prenatal vitamins and DHA supplements while breastfeeding  *You may use a stool softener if needed until comfortable with bowel movements, may take up to 100 mg of docusate sodium (Colace- over the counter) twice daily    Appointments:   *Follow-up with CNM in 2 to 6 weeks as directed    Your Care Instructions  Congratulations on the birth of your baby. Like pregnancy, the  period can be a time of excitement, asya, and exhaustion. You may look at your wondrous little baby and feel happy. You may also be overwhelmed by your new sleep hours and new responsibilities. At first, babies often sleep during the days and are awake at night. They do not have a pattern or routine. They may make sudden gasps, jerk themselves awake, or look like they have crossed eyes. These are all normal, and they may even make you smile. In these first weeks after delivery, try to take good care of yourself. It may take 4 to 6 weeks to feel like yourself again, and possibly longer if you had a  birth. You will likely feel very tired for several weeks. Your days will be full of ups and downs, but lots of asya as well. Follow-up care is a key part of your treatment and safety. Be sure to make and go to all appointments, and call your midwife if you are having problems. It's also a good idea to know your test results and keep a list of the medicines you take. How can you care for yourself at home? Take care of your body after delivery  · Use pads instead of tampons for the bloody flow that may last as long as 2 weeks. · Ease cramps with ibuprofen (Advil). · Ease soreness of hemorrhoids and the area between your vagina and rectum with ice compresses or witch hazel pads. · Ease constipation by drinking lots of fluid and eating high-fiber foods. Ask your midwife about over-the-counter stool softeners. · Cleanse yourself with a gentle squeeze of warm water from a bottle instead of wiping with toilet paper. · Take a sitz bath in warm water several times a day. · Wear a good nursing bra. Ease sore and swollen breasts with warm, wet washcloths.   · If you are not breast-feeding, use ice rather than heat for breast soreness. · Your period may not start for several months if you are breast-feeding. You may bleed more, and longer at first, than you did before you got pregnant. · Wait until you are healed (about 4 to 6 weeks) before you have sexual intercourse. · Try not to travel with your baby for 5 or 6 weeks. If you take a long car trip, make frequent stops to walk around and stretch. Avoid exhaustion  · Rest every day. Try to nap when your baby naps. · Ask another adult to be with you for a few days after delivery. · Plan for  if you have other children. · Stay flexible so you can eat at odd hours and sleep when you need to. Both you and your baby are making new schedules. · Plan small trips to get out of the house. Change can make you feel less tired. · Ask for help with housework, cooking, and shopping. Remind yourself that your job is to care for your baby. Know about help for postpartum depression  · \"Baby blues are common for the first 1 to 2 weeks after birth. You may cry or feel sad or irritable for no reason. · Rest whenever you can. Being tired makes it harder to handle your emotions. · Go for walks with your baby. · Talk to your partner, friends, and family about your feelings. · If your symptoms last for more than a few weeks, or if you feel very depressed, ask your doctor for help. · Postpartum depression can be treated. Support groups and counseling can help. Sometimes medicine can also help. Stay healthy  · Eat healthy foods so you have more energy, make good breast milk, and lose extra baby pounds. · If you breast-feed, avoid alcohol and drugs. Stay smoke-free. If you quit during pregnancy, congratulations. · Start daily exercise after 4 to 6 weeks, but rest when you feel tired. · Learn exercises to tone your belly. Do Kegel exercises to regain strength in your pelvic muscles. You can do these exercises while you stand or sit.   ¨ Squeeze the same muscles you would use to stop your urine. Your belly and rear end (buttocks) should not move. ¨ Hold the squeeze for 3 seconds, then relax for 3 seconds. ¨ Repeat the exercise 10 to 15 times for each session. Do three or more sessions each day. · Find a class for new mothers and new babies that has an exercise time. · If you had a  birth, give yourself a bit more time before you exercise, and be careful. When should you call for help? Call 911 anytime you think you may need emergency care. For example, call if:  · You have sudden, severe pain in your belly. · You passed out (lost consciousness). Call your provider now or seek immediate medical care if:  · You have severe vaginal bleeding. You are passing blood clots and soaking through a pad each hour for 2 or more hours. · Your vaginal bleeding seems to be getting heavier or is still bright red 4 days after delivery, or you pass blood clots larger than the size of a golf ball. · You are dizzy or lightheaded, or you feel like you may faint. · You are vomiting or cannot keep fluids down. · You have a fever. · You have new or more belly pain. · You pass tissue (not just blood). · Your breast or breasts have hard, red, or tender areas. · You have an urgent or frequent need to urinate, along with a burning feeling. · You have severe pain, tenderness, or swelling in your vagina or the area between your rectum and vagina. · You have severe pains in your chest, belly, back, or legs. · You have feelings of severe despair or great anxiety. · Your baby is unusually cranky or is sleeping too much. · Your baby's eyes are red or have discharge. · Your baby has white patches on the roof and sides of the mouth or tongue. · Your baby's umbilical cord is foul-smelling, swollen, red, or leaking pus. · There is blood or mucus in your baby's bowel movements. · Your baby has fewer than 6 wet diapers a day.   · Your baby does not want to eat, or your baby is throwing up with every feeding. · Your baby has trouble breathing. · Your baby has a rectal temperature of 100.4°F or more, or an underarm temperature over 99.4°F.  · You baby has a low temperature less than 97.5°F rectal, or less than 97. 0°F underarm. · Your baby's skin looks yellow. Watch closely for changes in your health, and be sure to contact your doctor if you have any problems. Where can you learn more? Go to Qvanteq.be  Enter A461 in the search box to learn more about \"After Your Delivery (the Postpartum Period): After Your Visit. \"   © 0511-4364 Healthwise, Incorporated. Care instructions adapted under license by [employer] (which disclaims liability or warranty for this information). This care instruction is for use with your licensed healthcare professional. If you have questions about a medical condition or this instruction, always ask your healthcare professional. Carrington Clayton any warranty or liability for your use of this information. Content Version: 8.8.19792; Last Revised: July 8, 2010      Depression After Childbirth: After Your Birth  Many women get the \"baby blues\" during the first few days after childbirth. They may lose sleep, feel irritable, cry easily, and feel happy one minute and sad the next. Hormone changes are one cause of these emotional changes. Also, the demands of a new baby, coupled with visits from relatives or other family needs, add to a mother's stress. The \"baby blues\" usually peak around the fourth day and then ease up in less than 2 weeks. If your moodiness or anxiety lasts for more than 2 weeks, or if you feel like life is not worth living, you may have postpartum depression. This is different for each mother. Some mothers with serious depression may worry intensely about their infant's well-being, while others may feel distant from their child. Some mothers might even feel that they might harm their baby.  A mother may have signs of paranoia, wondering if someone is watching her. Depression is not a sign of weakness. It is a medical condition that requires treatment. Medicine and counseling are often effective at reducing depression. Talk to your midwife about taking antidepressant medicine while breast-feeding. Follow-up care is a key part of your treatment and safety. Be sure to make and go to all appointments, and call your midwife if you are having problems. It's also a good idea to know your test results and keep a list of the medicines you take. How can you care for yourself at home? · Take your medicines exactly as prescribed. Call your provider if you think you are having a problem with your medicine. · Eat a balanced diet, so that you can keep up your energy. · Get regular daily exercise, such as walks, to help improve your mood. · Get as much sunlight as possible. Keep your shades and curtains open, and get outside as much as you can. · Avoid using alcohol or other substances to feel better. · Get as much rest and sleep as possible, and avoid doing too much. Being too tired can increase depression. · Play stimulating music throughout your day and soothing music at night. · Schedule outings and visits with friends and family. Ask them to call you regularly, so that you do not feel alone. · Ask for help with preparing food and other daily tasks. Family and friends are often happy to help a mother with a . · Be honest with yourself and those who care about you. Tell them about your struggle. · Join a support group of new mothers. No one can better understand the challenges of caring for a  than other new mothers. When should you call for help? Call 911 anytime you think you may need emergency care. For example, call if:  · You feel you cannot stop from hurting yourself or someone else.   Call your provider now or seek immediate medical care if:  · You are having trouble caring for yourself or your baby.  · You have signs of paranoia that can occur with postpartum depression. You fear that someone is watching you, stealing from you, or reading your mind. · You hear voices. · You have symptoms of postpartum depression, such as:  ¨ Sleeplessness. ¨ Anxiety. ¨ Hopelessness. ¨ Irritability. ¨ Poor concentration. · Someone you know has depression and:  ¨ Starts to give away his or her possessions. ¨ Uses illegal drugs or drinks alcohol heavily. ¨ Talks or writes about death, including writing suicide notes and talking about guns, knives, or pills. ¨ Starts to spend a lot of time alone. ¨ Acts very aggressively or suddenly appears calm. Watch closely for changes in your health, and be sure to contact your doctor if you have any problems. Where can you learn more? Go to GameChanger [employer].be  Enter V911 in the search box to learn more about \"Depression After Childbirth: After Your Visit. \"   © 2117-5492 Healthwise, Incorporated. Care instructions adapted under license by Nani Chaudhry (which disclaims liability or warranty for this information). This care instruction is for use with your licensed healthcare professional. If you have questions about a medical condition or this instruction, always ask your healthcare professional. Paddy Mays any warranty or liability for your use of this information. Content Version: 8.8.27126;  Last Revised: April 27, 2009

## 2018-04-14 NOTE — LACTATION NOTE
This note was copied from a baby's chart. Pt will successfully establish breastfeeding by feeding in response to early feeding cues   or wake every 3h, will obtain deep latch, and will keep log of feedings/output. Taught to BF at hunger cues and or q 2-3 hrs and to offer 10-20 drops of hand expressed colostrum at any non-feeds. Breast Assessment  Left Breast: Large, Extra large, Engorged  Left Nipple: Everted, Intact  Right Breast: Large, Extra large, Engorged  Right Nipple: Everted, Intact  Breast- Feeding Assessment  Attends Breast-Feeding Classes: No  Breast-Feeding Experience: Yes  Breast Trauma/Surgery: No  Type/Quality: Good  Lactation Consultant Visits  Breast-Feedings: Good   Mother/Infant Observation  Mother Observation: Alignment, Breast comfortable, Close hold  Infant Observation: Latches nipple and aereolae, Lips flanged, lower, Lips flanged, upper, Opens mouth  LATCH Documentation  Latch: Grasps breast, tongue down, lips flanged, rhythmic sucking  Audible Swallowing: Spontaneous and intermittent (24 hours old)  Type of Nipple: Everted (after stimulation)  Comfort (Breast/Nipple): Soft/non-tender  Hold (Positioning): No assist from staff, mother able to position/hold infant  LATCH Score: 10  Engorgement Care Guidelines:  Reviewed how milk is made and normal phases of milk production. Taught care of engorged breasts - frequent breastfeeding encouraged, cool packs and motrin as tolerated. Anticipatory guidance shared. Mother only stopped feeding toddler 5 months ago, milk is in, baby gulping at the breast, some spit up noted after feeding. Bilirubin is high risk per NP in the room, states baby has to have follow up bili within 24 hours so can not be discharged at this time but can do follow up at 3pm today and see if it has gone down. Mothers ped not open on sundays, she has appt scheduled for Monday. Mother teary, calling to father to update him on plan.

## 2018-04-15 NOTE — PROGRESS NOTES
4/14/2018-1630 patient discharge. Infant to remain inpatient related to hyperbilirubinemia and spittiness. Patient to board in room. Boarding reviewed with patient. Patient verbalized understanding. Per patient no further questions. Discharge instructions reviewed with patient and copy given. Supplies given.

## 2018-08-28 ENCOUNTER — TELEPHONE (OUTPATIENT)
Dept: OBGYN CLINIC | Age: 28
End: 2018-08-28

## 2018-08-28 ENCOUNTER — TELEPHONE (OUTPATIENT)
Dept: MIDWIFE SERVICES | Age: 28
End: 2018-08-28

## 2018-08-28 NOTE — TELEPHONE ENCOUNTER
Returned pt call. Discussed lump and will order breast ultrasound and call pt back with appt time. Pts' mother had breast CA. She feels it is a clogged duct but her  is worried.

## 2018-08-28 NOTE — LETTER
08/29/2018 Yasmin Reyes 
[address] [address] Dear Halley Garcia; We have been unable to reach you by telephone regarding an appointment with [facility]. We were able to schedule this with Dr. Nuris Vigil for Thursday, 09/06/2018 at 10:00am. Please arrive 20 minutes ahead for registration. Address: [address] [place] [address], [facility],[place] [address], [facility] [address] [facility] Please call our office at [phone] for any further assistance. Respectfully, 
 
 
 
DAMIAN Huddleston CNM's Nurse)

## 2018-08-28 NOTE — TELEPHONE ENCOUNTER
Patient calling stating that she has found a lump on her left breast in her areola that has been there for 2 months. Patient is breast feeding and the lump is not painful or tender. The area has not drained at all and has a rivera appears like a pimple but when she tried to expel it, she cannot reach underneath due to the lump. Patient is requesting a return call from the midwife. She cannot be seen today and does not want to go to urgent care.     Patient can be reached at [phone]

## 2018-08-29 DIAGNOSIS — N63.0 BREAST LUMP: Primary | ICD-10-CM

## 2018-08-29 NOTE — TELEPHONE ENCOUNTER
Called [facility] and sheridan'd patient an appt. W/Dr. Foster Moya for 09/06/2018 at 10:00am.    Then called patient and her mailbox is full. Unable to LM. Patient does not have an emergency contact. Letter mailed.

## 2018-09-10 ENCOUNTER — OFFICE VISIT (OUTPATIENT)
Dept: SURGERY | Age: 28
End: 2018-09-10

## 2018-09-10 VITALS
WEIGHT: 159 LBS | HEART RATE: 90 BPM | DIASTOLIC BLOOD PRESSURE: 85 MMHG | HEIGHT: 61 IN | SYSTOLIC BLOOD PRESSURE: 122 MMHG | BODY MASS INDEX: 30.02 KG/M2

## 2018-09-10 DIAGNOSIS — N60.81 SEBACEOUS CYST OF BREAST, RIGHT: Primary | ICD-10-CM

## 2018-09-10 RX ORDER — DICLOXACILLIN SODIUM 250 MG/1
250 CAPSULE ORAL
Qty: 20 CAP | Refills: 1 | Status: SHIPPED | OUTPATIENT
Start: 2018-09-10 | End: 2019-01-03

## 2018-09-10 NOTE — LETTER
9/10/2018 12:59 PM 
 
Patient:  Gail Curran YOB: 1990 Date of Visit: 9/10/2018 Dear Jessica Baig: Thank you for referring Ms. Neff Monday to me for evaluation/treatment. Below are the relevant portions of my assessment and plan of care. HISTORY OF PRESENT ILLNESS Gail Curran is a 32 y.o. female. HPI 
NEW patient consult referred by Midwife at [facility] for RIGHT breast lump. She noticed the lump 2 months ago. It is not tender or red. It has gotten smaller over the last 2 months. She is breast feeding and doesn't notice any change in her milk supply.   
  
Denies FH of breast or ovarian cancer.  
  
No imaging. No past medical history on file. Past Surgical History:  
Procedure Laterality Date [address] EXTRACTION    HX WRIST FRACTURE TX Left  Social History Social History  Marital status: SINGLE Spouse name: N/A  
 Number of children: N/A  
 Years of education: N/A Occupational History     
  [employer] Social History Main Topics  Smoking status: Current Every Day Smoker Packs/day: 0.50 Years: 6.00 Types: Cigarettes Last attempt to quit: 10/20/2017  Smokeless tobacco: Never Used Comment: restarted smoking  Alcohol use No  
 Drug use: No  
 Sexual activity: Not Currently Partners: Male Birth control/ protection: None Comment: had been on birth control pills but not for 3 months Other Topics Concern  Not on file Social History Narrative Current Outpatient Prescriptions on File Prior to Visit Medication Sig Dispense Refill  PNV WV.31/UEIMUPU FUM/FOLIC AC (PRENATAL PO) Take  by mouth. No current facility-administered medications on file prior to visit. Allergies Allergen Reactions  Bee Sting [Sting, Bee] Swelling OB History  Para Term  AB Living 3 3 3   3 SAB TAB Ectopic Molar Multiple Live Births 0 3 Obstetric Comments Menarche 13, LMP , # of children 1, age of 4st delivery 20, Hysterectomy/oophorectomy no/no, Breast bx no, history of breast feeding yes, BCP yes, Hormone therapy no ROS Constitutional: Negative. HENT: Negative. Eyes: Negative. Respiratory: Negative. Cardiovascular: Negative. Gastrointestinal: Negative. Genitourinary: Negative. Musculoskeletal: Negative. Skin: Negative. Neurological: Negative. Endo/Heme/Allergies: Negative. Psychiatric/Behavioral: Negative. Physical Exam  
Cardiovascular: Normal rate, regular rhythm and normal heart sounds. Pulmonary/Chest: Breath sounds normal. Right breast exhibits no inverted nipple, no mass, no nipple discharge, no skin change and no tenderness. Left breast exhibits no inverted nipple, no mass, no nipple discharge, no skin change and no tenderness. Breasts are symmetrical.  
 
 
Lymphadenopathy:  
     Right cervical: No superficial cervical, no deep cervical and no posterior cervical adenopathy present. Left cervical: No superficial cervical, no deep cervical and no posterior cervical adenopathy present. She has no axillary adenopathy. Right axillary: No pectoral and no lateral adenopathy present. Left axillary: No pectoral and no lateral adenopathy present. BREAST ULTRASOUND Indication: RIGHT Breast swelling Technique: The area was scanned using a high-frequency linear-array near-field transducer Findings: Small, hypoechoic area located close to the skin surface, through transmission Impression: Probable sebaceous cyst  
Disposition: Antibiotic rx and f/u in 2 weeks ASSESSMENT and PLAN 
  ICD-10-CM ICD-9-CM 1. Sebaceous cyst of breast, right N60.81 610.8 New patient presents for evaluation of possible RIGHT breast clogged duct and lump x2 months.  Pt states that this is not bothersome, and that she is still able to breastfeed her 11 month old baby. Sebaceous cysts noted bilaterally on exam. Swelling on exam at RIGHT breast UIQ, US visualizes probable superficial sebaceous cyst, and normal breast tissue, otherwise normal ductal dilatation during lactation. Inflammation of tissue due to probable low grade infection. Recommend observation, will prescribe Dicloxacillin. F/U in 2 weeks. This plan was reviewed with the patient and patient agrees. All questions were answered. Written by Sd Jaimes, as dictated by Dr. Lopez Rosario MD. 
 
 
If you have questions, please do not hesitate to call me. I look forward to following Ms. Zavala along with you. Sincerely, 
 
Monet Cui MD

## 2018-09-10 NOTE — PATIENT INSTRUCTIONS
Breast Lumps: Care Instructions  Your Care Instructions  Breast lumps are common, especially in women between ages 27 and 48. Many women's breasts feel lumpy and tender before their menstrual period. Women also may have lumps when they are breastfeeding. Breast lumps may go away after menopause. All new breast lumps in women after menopause should be checked by a doctor. Although lumps may be normal for you, it is important to have your doctor check any lump or thickness that is not like the rest of your breast to make sure it is not cancer. A lump may be larger, harder, or different from the rest of your breast tissue. Follow-up care is a key part of your treatment and safety. Be sure to make and go to all appointments, and call your doctor if you are having problems. It's also a good idea to know your test results and keep a list of the medicines you take. How can you care for yourself at home? · Make an appointment to have a mammogram and other follow-up visits as recommended by your doctor. When should you call for help? Watch closely for changes in your health, and be sure to contact your doctor if:    · You do not get better as expected.     · Your breast has changed.     · You have pain in your breast.     · You have a discharge from your nipple.     · A breast lump changes or does not go away. Where can you learn more? Go to http://henrique-cesar.info/. Enter T063 in the search box to learn more about \"Breast Lumps: Care Instructions. \"  Current as of: October 6, 2017  Content Version: 11.7  © 9668-1614 Healthwise, Incorporated. Care instructions adapted under license by [employer] (which disclaims liability or warranty for this information). If you have questions about a medical condition or this instruction, always ask your healthcare professional. [address] any warranty or liability for your use of this information.        Breast Lumps: Care Instructions  Your Care Instructions  Breast lumps are common, especially in women between ages 27 and 48. Many women's breasts feel lumpy and tender before their menstrual period. Women also may have lumps when they are breastfeeding. Breast lumps may go away after menopause. All new breast lumps in women after menopause should be checked by a doctor. Although lumps may be normal for you, it is important to have your doctor check any lump or thickness that is not like the rest of your breast to make sure it is not cancer. A lump may be larger, harder, or different from the rest of your breast tissue. Follow-up care is a key part of your treatment and safety. Be sure to make and go to all appointments, and call your doctor if you are having problems. It's also a good idea to know your test results and keep a list of the medicines you take. How can you care for yourself at home? · Make an appointment to have a mammogram and other follow-up visits as recommended by your doctor. When should you call for help? Watch closely for changes in your health, and be sure to contact your doctor if:    · You do not get better as expected.     · Your breast has changed.     · You have pain in your breast.     · You have a discharge from your nipple.     · A breast lump changes or does not go away. Where can you learn more? Go to http://henrique-cesar.info/. Enter D860 in the search box to learn more about \"Breast Lumps: Care Instructions. \"  Current as of: October 6, 2017  Content Version: 11.7  © 0928-1673 [employer]. Care instructions adapted under license by [employer] (which disclaims liability or warranty for this information). If you have questions about a medical condition or this instruction, always ask your healthcare professional. [address] any warranty or liability for your use of this information.

## 2018-09-10 NOTE — PROGRESS NOTES
HISTORY OF PRESENT ILLNESS  Jonathon Alarcon is a 32 y.o. female. HPI  NEW patient consult referred by Midwife at [address] [place] [address] for RIGHT breast lump. She noticed the lump 2 months ago. It is not tender or red. It has gotten smaller over the last 2 months. She is breast feeding and doesn't notice any change in her milk supply.       Denies FH of breast or ovarian cancer.      No imaging. No past medical history on file. Past Surgical History:   Procedure Laterality Date    HX WISDOM TEETH EXTRACTION      HX WRIST FRACTURE TX Left        Social History     Social History    Marital status: SINGLE     Spouse name: N/A    Number of children: N/A    Years of education: N/A     Occupational History          [employer]     Social History Main Topics    Smoking status: Current Every Day Smoker     Packs/day: 0.50     Years: 6.00     Types: Cigarettes     Last attempt to quit: 10/20/2017    Smokeless tobacco: Never Used      Comment: restarted smoking    Alcohol use No    Drug use: No    Sexual activity: Not Currently     Partners: Male     Birth control/ protection: None      Comment: had been on birth control pills but not for 3 months     Other Topics Concern    Not on file     Social History Narrative       Current Outpatient Prescriptions on File Prior to Visit   Medication Sig Dispense Refill    PNV HK.84/YAASHHU FUM/FOLIC AC (PRENATAL PO) Take  by mouth. No current facility-administered medications on file prior to visit. Allergies   Allergen Reactions    Bee Sting [Sting, Bee] Swelling       OB History      Para Term  AB Living    3 3 3   3    SAB TAB Ectopic Molar Multiple Live Births        0 3        Obstetric Comments    Menarche 13, LMP , # of children 1, age of 4st delivery 20, Hysterectomy/oophorectomy no/no, Breast bx no, history of breast feeding yes, BCP yes, Hormone therapy no        ROS  Constitutional: Negative. HENT: Negative.     Eyes: Negative. Respiratory: Negative. Cardiovascular: Negative. Gastrointestinal: Negative. Genitourinary: Negative. Musculoskeletal: Negative. Skin: Negative. Neurological: Negative. Endo/Heme/Allergies: Negative. Psychiatric/Behavioral: Negative. Physical Exam   Cardiovascular: Normal rate, regular rhythm and normal heart sounds. Pulmonary/Chest: Breath sounds normal. Right breast exhibits no inverted nipple, no mass, no nipple discharge, no skin change and no tenderness. Left breast exhibits no inverted nipple, no mass, no nipple discharge, no skin change and no tenderness. Breasts are symmetrical.       Lymphadenopathy:        Right cervical: No superficial cervical, no deep cervical and no posterior cervical adenopathy present. Left cervical: No superficial cervical, no deep cervical and no posterior cervical adenopathy present. She has no axillary adenopathy. Right axillary: No pectoral and no lateral adenopathy present. Left axillary: No pectoral and no lateral adenopathy present. BREAST ULTRASOUND  Indication: RIGHT Breast swelling  Technique: The area was scanned using a high-frequency linear-array near-field transducer  Findings: Small, hypoechoic area located close to the skin surface, through transmission  Impression: Probable sebaceous cyst   Disposition: Antibiotic rx and f/u in 2 weeks    ASSESSMENT and PLAN    ICD-10-CM ICD-9-CM    1. Sebaceous cyst of breast, right N60.81 610.8       New patient presents for evaluation of possible RIGHT breast clogged duct and lump x2 months. Pt states that this is not bothersome, and that she is still able to breastfeed her 11 month old baby. Sebaceous cysts noted bilaterally on exam. Swelling on exam at RIGHT breast UIQ, US visualizes probable superficial sebaceous cyst, and normal breast tissue, otherwise normal ductal dilatation during lactation. Inflammation of tissue due to probable low grade infection. Recommend observation, will prescribe Dicloxacillin. F/U in 2 weeks. This plan was reviewed with the patient and patient agrees. All questions were answered.     Written by Shen Mendiola, as dictated by Dr. Claudeen Kanner, MD.

## 2018-09-10 NOTE — PROGRESS NOTES
HISTORY OF PRESENT ILLNESS Daxa Rivera is a 32 y.o. female. HPI  NEW patient consult referred by Midwife at [facility] for RIGHT breast lump. She noticed the lump 2 months ago. It is not tender or red. It has gotten smaller over the last 2 months. She is breast feeding and doesn't notice any change in her milk supply. Denies FH of breast or ovarian cancer. No imaging. Review of Systems Constitutional: Negative. HENT: Negative. Eyes: Negative. Respiratory: Negative. Cardiovascular: Negative. Gastrointestinal: Negative. Genitourinary: Negative. Musculoskeletal: Negative. Skin: Negative. Neurological: Negative. Endo/Heme/Allergies: Negative. Psychiatric/Behavioral: Negative. Physical Exam 
 
ASSESSMENT and PLAN 
{ASSESSMENT/PLAN:32539}

## 2018-09-10 NOTE — COMMUNICATION BODY
HISTORY OF PRESENT ILLNESS  Francoise Orozco is a 32 y.o. female. HPI  NEW patient consult referred by Midwife at [address] [place] [address] for RIGHT breast lump. She noticed the lump 2 months ago. It is not tender or red. It has gotten smaller over the last 2 months. She is breast feeding and doesn't notice any change in her milk supply.       Denies FH of breast or ovarian cancer.      No imaging. No past medical history on file. Past Surgical History:   Procedure Laterality Date    HX WISDOM TEETH EXTRACTION      HX WRIST FRACTURE TX Left        Social History     Social History    Marital status: SINGLE     Spouse name: N/A    Number of children: N/A    Years of education: N/A     Occupational History          [employer]     Social History Main Topics    Smoking status: Current Every Day Smoker     Packs/day: 0.50     Years: 6.00     Types: Cigarettes     Last attempt to quit: 10/20/2017    Smokeless tobacco: Never Used      Comment: restarted smoking    Alcohol use No    Drug use: No    Sexual activity: Not Currently     Partners: Male     Birth control/ protection: None      Comment: had been on birth control pills but not for 3 months     Other Topics Concern    Not on file     Social History Narrative       Current Outpatient Prescriptions on File Prior to Visit   Medication Sig Dispense Refill    PNV AB.65/IVAJMBX FUM/FOLIC AC (PRENATAL PO) Take  by mouth. No current facility-administered medications on file prior to visit. Allergies   Allergen Reactions    Bee Sting [Sting, Bee] Swelling       OB History      Para Term  AB Living    3 3 3   3    SAB TAB Ectopic Molar Multiple Live Births        0 3        Obstetric Comments    Menarche 13, LMP , # of children 1, age of 4st delivery 20, Hysterectomy/oophorectomy no/no, Breast bx no, history of breast feeding yes, BCP yes, Hormone therapy no        ROS  Constitutional: Negative. HENT: Negative.     Eyes: Negative. Respiratory: Negative. Cardiovascular: Negative. Gastrointestinal: Negative. Genitourinary: Negative. Musculoskeletal: Negative. Skin: Negative. Neurological: Negative. Endo/Heme/Allergies: Negative. Psychiatric/Behavioral: Negative. Physical Exam   Cardiovascular: Normal rate, regular rhythm and normal heart sounds. Pulmonary/Chest: Breath sounds normal. Right breast exhibits no inverted nipple, no mass, no nipple discharge, no skin change and no tenderness. Left breast exhibits no inverted nipple, no mass, no nipple discharge, no skin change and no tenderness. Breasts are symmetrical.       Lymphadenopathy:        Right cervical: No superficial cervical, no deep cervical and no posterior cervical adenopathy present. Left cervical: No superficial cervical, no deep cervical and no posterior cervical adenopathy present. She has no axillary adenopathy. Right axillary: No pectoral and no lateral adenopathy present. Left axillary: No pectoral and no lateral adenopathy present. BREAST ULTRASOUND  Indication: RIGHT Breast swelling  Technique: The area was scanned using a high-frequency linear-array near-field transducer  Findings: Small, hypoechoic area located close to the skin surface, through transmission  Impression: Probable sebaceous cyst   Disposition: Antibiotic rx and f/u in 2 weeks    ASSESSMENT and PLAN    ICD-10-CM ICD-9-CM    1. Sebaceous cyst of breast, right N60.81 610.8       New patient presents for evaluation of possible RIGHT breast clogged duct and lump x2 months. Pt states that this is not bothersome, and that she is still able to breastfeed her 11 month old baby. Sebaceous cysts noted bilaterally on exam. Swelling on exam at RIGHT breast UIQ, US visualizes probable superficial sebaceous cyst, and normal breast tissue, otherwise normal ductal dilatation during lactation. Inflammation of tissue due to probable low grade infection. Recommend observation, will prescribe Dicloxacillin. F/U in 2 weeks. This plan was reviewed with the patient and patient agrees. All questions were answered.     Written by Ermias Chowdhury, as dictated by Dr. Abe Burks MD.

## 2018-09-27 ENCOUNTER — DOCUMENTATION ONLY (OUTPATIENT)
Dept: SURGERY | Age: 28
End: 2018-09-27

## 2018-12-13 ENCOUNTER — OFFICE VISIT (OUTPATIENT)
Dept: OBGYN CLINIC | Age: 28
End: 2018-12-13

## 2018-12-13 VITALS
WEIGHT: 160 LBS | HEIGHT: 61 IN | DIASTOLIC BLOOD PRESSURE: 77 MMHG | BODY MASS INDEX: 30.21 KG/M2 | SYSTOLIC BLOOD PRESSURE: 123 MMHG | HEART RATE: 100 BPM | RESPIRATION RATE: 16 BRPM

## 2018-12-13 DIAGNOSIS — B96.89 BV (BACTERIAL VAGINOSIS): ICD-10-CM

## 2018-12-13 DIAGNOSIS — Z34.81 ENCOUNTER FOR SUPERVISION OF NORMAL PREGNANCY IN MULTIGRAVIDA IN FIRST TRIMESTER: ICD-10-CM

## 2018-12-13 DIAGNOSIS — Z3A.17 17 WEEKS GESTATION OF PREGNANCY: Primary | ICD-10-CM

## 2018-12-13 DIAGNOSIS — N76.0 BV (BACTERIAL VAGINOSIS): ICD-10-CM

## 2018-12-13 PROBLEM — O46.93 VAGINAL BLEEDING IN PREGNANCY, THIRD TRIMESTER: Status: RESOLVED | Noted: 2018-03-29 | Resolved: 2018-12-13

## 2018-12-13 PROBLEM — Z3A.37 37 WEEKS GESTATION OF PREGNANCY: Status: RESOLVED | Noted: 2018-03-29 | Resolved: 2018-12-13

## 2018-12-13 LAB
BILIRUB UR QL STRIP: NEGATIVE
GLUCOSE UR-MCNC: NEGATIVE MG/DL
HBSAG, EXTERNAL: NEGATIVE
HIV, EXTERNAL: NONREACTIVE
KETONES P FAST UR STRIP-MCNC: NEGATIVE MG/DL
PH UR STRIP: 5 [PH] (ref 4.6–8)
PROT UR QL STRIP: NORMAL
RUBELLA, EXTERNAL: NORMAL
SP GR UR STRIP: 1 (ref 1–1.03)
T. PALLIDUM, EXTERNAL: NEGATIVE
TYPE, ABO & RH, EXTERNAL: NORMAL
UA UROBILINOGEN AMB POC: NORMAL (ref 0.2–1)
URINALYSIS CLARITY POC: CLEAR
URINALYSIS COLOR POC: YELLOW
URINE BLOOD POC: NEGATIVE
URINE LEUKOCYTES POC: NORMAL
URINE NITRITES POC: NEGATIVE
WET MOUNT POCT, WMPOCT: NORMAL

## 2018-12-13 RX ORDER — METRONIDAZOLE 500 MG/1
500 TABLET ORAL 3 TIMES DAILY
Qty: 30 TAB | Refills: 0 | Status: SHIPPED | OUTPATIENT
Start: 2018-12-13 | End: 2018-12-23

## 2018-12-13 NOTE — PROGRESS NOTES
Subjective:     Wes Germain is being seen today for her first obstetrical visit. This isnot a planned pregnancy. She had a baby in April and never had a period. Noticed about a month ago that her milk supply was diminished. She is shocked. This morning US showed fetal size at 17 6/7 weeks With an [facility] of  7/17/19    Her obstetrical history is significant for smoker. Stopped smoking one week ago     Her medical history is significant for nothing  Her current medications include: Prenatal vitamins  She is seeking midwifery care  Denies travel to [facility] affected area. Last pap 4 years ago? History fully reviewed- see chart    See patient intake form for complete ROS, scanned in pt chart, under media tab. ROS negative other than boils on face, groin, breast    Objective:     Refer to Prenatal Flowsheet and Physical for exam findings. Fundal Height 17-18 weeks    The patient appears well, alert, oriented x 3, in no distress. Visit Vitals  /77   Pulse 100   Resp 16   Ht 5' 1\" (1.549 m)   Wt 160 lb (72.6 kg)   LMP  (LMP Unknown)   Breastfeeding? Unknown Comment: Breastfeeding   BMI 30.23 kg/m²     Neck supple. No adenopathy or thyromegaly. Lungs are clear, good air entry, no wheezes, rhonchi or rales. S1 and S2 normal, no murmurs, regular rate and rhythm. Abdomen soft without tenderness, guarding, mass or organomegaly. Extremities show no edema. Neurological is normal, no focal findings. BREAST EXAM: breasts appear normal, no suspicious masses, no skin or nipple changes or axillary nodes, left breast normal without mass, skin or nipple changes or axillary nodes. Near right nipple has a boil which is oozing    PELVIC EXAM: normal external genitalia, vulva, vagina, cervix, uterus and adnexa   Pap smear collected    Assessment:     Pregnancy at  16 and 6/7 weeks  BV  Close spacing of pregnancies    Plan:     Labs: New OB lab slip given.   Urine culture and GC/Chlamydia done  Pap obtained  Continue prenatal vitamins. Genetic screening options reviewed. At this time the patient declines  Midwifery care/philosophy discussed including MD collaboration  New OB packet given and reviewed, including nutrition, exercise, what to expect at prenatal visits, common complaints, danger signs and practice info. Invited to [facility] for due dates in May. Given brochure with provider, dates, and time. At this time patient declines.    Follow-up in 4 weeks  US in 3 weeks  Rx Flagyl

## 2018-12-13 NOTE — PROGRESS NOTES
Visit Vitals  /77   Pulse 100   Resp 16   Ht 5' 1\" (1.549 m)   Wt 160 lb (72.6 kg)   Breastfeeding? Unknown   BMI 30.23 kg/m²      TA ULTRASOUND  A SINGLEVERTEX 17W6D IUP IS SEEN BY TODAY'S ULTRASOUND. FETAL CARDIAC MOTION OBSERVED. LIMITED ANATOMY WAS VISUALIZED AND APPEARS WNL. MAGALY AND PLACENTA APPEAR WNL. Identified pt with two pt identifiers(name and ). Reviewed record in preparation for visit and have obtained necessary documentation. Chief Complaint   Patient presents with    Routine Prenatal Visit        Health Maintenance Due   Topic    PAP AKA CERVICAL CYTOLOGY     Influenza Age 5 to Adult        Coordination of Care Questionnaire:  :   1) Have you been to an emergency room, urgent care, or hospitalized since your last visit?   no   If yes, where when, and reason for visit? 2. Have seen or consulted any other health care provider since your last visit? NO  If yes, where when, and reason for visit? 3) Do you have an Advanced Directive/ Living Will in place? NO  If yes, do we have a copy on file NO  If no, would you like information NO    Patient is accompanied by self I have received verbal consent from Kristan Luna to discuss any/all medical information while they are present in the room.

## 2018-12-15 LAB
ABO GROUP BLD: NORMAL
BLD GP AB SCN SERPL QL: NEGATIVE
CYTOLOGIST CVX/VAG CYTO: NORMAL
CYTOLOGY CVX/VAG DOC THIN PREP: NORMAL
CYTOLOGY HISTORY:: NORMAL
DX ICD CODE: NORMAL
ERYTHROCYTE [DISTWIDTH] IN BLOOD BY AUTOMATED COUNT: 14.2 % (ref 12.3–15.4)
HBV SURFACE AG SERPL QL IA: NEGATIVE
HCT VFR BLD AUTO: 38.1 % (ref 34–46.6)
HGB BLD-MCNC: 12.9 G/DL (ref 11.1–15.9)
HIV 1+2 AB+HIV1 P24 AG SERPL QL IA: NON REACTIVE
HPV I/H RISK 1 DNA CVX QL PROBE+SIG AMP: NEGATIVE
Lab: NORMAL
MCH RBC QN AUTO: 32.8 PG (ref 26.6–33)
MCHC RBC AUTO-ENTMCNC: 33.9 G/DL (ref 31.5–35.7)
MCV RBC AUTO: 97 FL (ref 79–97)
OTHER STN SPEC: NORMAL
PATH REPORT.FINAL DX SPEC: NORMAL
PLATELET # BLD AUTO: 324 X10E3/UL (ref 150–379)
RBC # BLD AUTO: 3.93 X10E6/UL (ref 3.77–5.28)
RH BLD: POSITIVE
RUBV IGG SERPL IA-ACNC: 2.53 INDEX
STAT OF ADQ CVX/VAG CYTO-IMP: NORMAL
T PALLIDUM AB SER QL IA: NEGATIVE
WBC # BLD AUTO: 12.6 X10E3/UL (ref 3.4–10.8)

## 2018-12-18 PROBLEM — Z34.80 NORMAL PREGNANCY IN MULTIGRAVIDA: Status: RESOLVED | Noted: 2017-09-27 | Resolved: 2018-12-18

## 2018-12-18 LAB
C TRACH RRNA SPEC QL NAA+PROBE: NORMAL
N GONORRHOEA RRNA SPEC QL NAA+PROBE: NORMAL

## 2019-01-03 ENCOUNTER — ROUTINE PRENATAL (OUTPATIENT)
Dept: OBGYN CLINIC | Age: 29
End: 2019-01-03

## 2019-01-03 VITALS — SYSTOLIC BLOOD PRESSURE: 115 MMHG | WEIGHT: 158.2 LBS | DIASTOLIC BLOOD PRESSURE: 77 MMHG | BODY MASS INDEX: 29.89 KG/M2

## 2019-01-03 DIAGNOSIS — Z34.80 SUPERVISION OF OTHER NORMAL PREGNANCY, ANTEPARTUM: Primary | ICD-10-CM

## 2019-01-03 LAB
CHLAMYDIA, EXTERNAL: NEGATIVE
N. GONORRHEA, EXTERNAL: NEGATIVE

## 2019-01-03 NOTE — PROGRESS NOTES
Chief Complaint   Patient presents with    Pregnancy     Visit Vitals  /77   Wt 158 lb 3.2 oz (71.8 kg)   LMP  (LMP Unknown)   BMI 29.89 kg/m²     1. Have you been to the ER, urgent care clinic since your last visit? Hospitalized since your last visit? No    2. Have you seen or consulted any other health care providers outside of the [address] [place] [address] since your last visit? Include any pap smears or colon screening.  No

## 2019-01-03 NOTE — PROGRESS NOTES
S: Feeling well. No significant complaints or concerns. Reports consistent, daily fetal mvmt. Denies ctxs, LOF, or vaginal bldng. Denies travel to [facility] affected area. O: See prenatal flowsheet for maternal and fetal exam. Unable to view out flow tracts on ultrasound. Will need f/u. Needs repeat  GC/CT due to cancelled test from [facility]. Blood pressure 115/77, weight 158 lb 3.2 oz (71.8 kg), not currently breastfeeding. Elected not to treat BV due to cost of medication. Asymptomatic at this time.      A:  20w6d   Size=Dates    P: Repeat ultrasound at next visit to complete anatomy  See prenatal checklist for other topics covered during today's visit  RTO in 4 weeks for CHRISTEN with CNM  Repeat GC/CT

## 2019-01-04 LAB
C TRACH RRNA SPEC QL NAA+PROBE: NEGATIVE
N GONORRHOEA RRNA SPEC QL NAA+PROBE: NEGATIVE

## 2019-02-06 ENCOUNTER — ROUTINE PRENATAL (OUTPATIENT)
Dept: OBGYN CLINIC | Age: 29
End: 2019-02-06

## 2019-02-06 VITALS — WEIGHT: 164.8 LBS | DIASTOLIC BLOOD PRESSURE: 67 MMHG | SYSTOLIC BLOOD PRESSURE: 110 MMHG | BODY MASS INDEX: 31.14 KG/M2

## 2019-02-06 DIAGNOSIS — M54.50 ACUTE RIGHT-SIDED LOW BACK PAIN WITHOUT SCIATICA: ICD-10-CM

## 2019-02-06 DIAGNOSIS — Z34.80 SUPERVISION OF OTHER NORMAL PREGNANCY, ANTEPARTUM: Primary | ICD-10-CM

## 2019-02-06 NOTE — PROGRESS NOTES
S: Feeling well. No significant complaints or concerns except increased back pain in right lower back. Denies dysuria, sciatica, fetal pressure. On side she holds 9 month old and other children at times. Reports consistent, daily fetal mvmt. Denies ctxs, LOF, or vaginal bldng. Denies travel to [facility] affected area. O: See prenatal flowsheet for maternal and fetal exam. Reviewed need for TDAP with all IUPS. Blood pressure 110/67, weight 164 lb 12.8 oz (74.8 kg), unknown if currently breastfeeding. A:  25w5d   Size=Dates    P: Third trimester labs at next visit  Back exercises given  Consider PT consult if no improvement by next visit.    See prenatal checklist for other topics covered during today's visit  RTO in 2 weeks for CHRISTEN with OLIVER

## 2019-02-06 NOTE — PROGRESS NOTES
Chief Complaint   Patient presents with    Routine Prenatal Visit     Visit Vitals  /67   Wt 164 lb 12.8 oz (74.8 kg)   LMP  (LMP Unknown)   BMI 31.14 kg/m²     Here today for a routine prenatal visit and she has no complaints or concerns.

## 2019-02-25 ENCOUNTER — ROUTINE PRENATAL (OUTPATIENT)
Dept: OBGYN CLINIC | Age: 29
End: 2019-02-25

## 2019-02-25 VITALS
DIASTOLIC BLOOD PRESSURE: 71 MMHG | HEART RATE: 99 BPM | BODY MASS INDEX: 31.74 KG/M2 | SYSTOLIC BLOOD PRESSURE: 113 MMHG | WEIGHT: 168 LBS

## 2019-02-25 DIAGNOSIS — Z3A.28 28 WEEKS GESTATION OF PREGNANCY: Primary | ICD-10-CM

## 2019-02-25 NOTE — PROGRESS NOTES
S: Feeling well. No significant complaints or concerns. Reports consistent, daily fetal mvmt. Denies ctxs, LOF, or vaginal bldng. Denies travel to [facility] affected area. O: See prenatal flowsheet for maternal and fetal exam  Blood pressure 113/71, pulse 99, weight 168 lb (76.2 kg), unknown if currently breastfeeding.     A:  28w3d   Size=Dates    P: GTT and CBC today  See prenatal checklist for other topics covered during today's visit  RTO in 2 weeks for CHRISTEN with CNM  Given and reviewed 28 week packet

## 2019-02-26 LAB
ERYTHROCYTE [DISTWIDTH] IN BLOOD BY AUTOMATED COUNT: 14.7 % (ref 12.3–15.4)
GLUCOSE 1H P 50 G GLC PO SERPL-MCNC: 107 MG/DL (ref 65–139)
HCT VFR BLD AUTO: 32.7 % (ref 34–46.6)
HGB BLD-MCNC: 11.4 G/DL (ref 11.1–15.9)
MCH RBC QN AUTO: 33.3 PG (ref 26.6–33)
MCHC RBC AUTO-ENTMCNC: 34.9 G/DL (ref 31.5–35.7)
MCV RBC AUTO: 96 FL (ref 79–97)
PLATELET # BLD AUTO: 279 X10E3/UL (ref 150–379)
RBC # BLD AUTO: 3.42 X10E6/UL (ref 3.77–5.28)
WBC # BLD AUTO: 15 X10E3/UL (ref 3.4–10.8)

## 2019-03-12 ENCOUNTER — ROUTINE PRENATAL (OUTPATIENT)
Dept: OBGYN CLINIC | Age: 29
End: 2019-03-12

## 2019-03-12 VITALS — BODY MASS INDEX: 32.08 KG/M2 | WEIGHT: 169.8 LBS | SYSTOLIC BLOOD PRESSURE: 125 MMHG | DIASTOLIC BLOOD PRESSURE: 85 MMHG

## 2019-03-12 DIAGNOSIS — Z34.83 ENCOUNTER FOR SUPERVISION OF OTHER NORMAL PREGNANCY IN THIRD TRIMESTER: Primary | ICD-10-CM

## 2019-03-12 NOTE — PROGRESS NOTES
S: Presents today in tears. Her father is in the IUC in [facility] with kidney failure, lung mass. She is uncertain of his prognosis. May be flying to [facility] to see him. \"I already lost my mother. \"  No significant complaints or concerns. Is coughing at night but does not feel sick. May be allergies. Reports consistent, daily fetal mvmt. Denies ctxs, LOF, or vaginal bldng. Denies travel to [facility] affected area. O: See prenatal flowsheet for maternal and fetal exam  Blood pressure 125/85, weight 169 lb 12.8 oz (77 kg), unknown if currently breastfeeding. A:G4   30w4d   Size=Dates  Cough suspect caused by allergies    P: Given prenatal record for travel to [facility].   Recommended taking Zyrtec and benedryl  See prenatal checklist for other topics covered during today's visit  RTO in 2 weeks for CHRISTEN with CNM  Plans to get TDAP next visit

## 2019-03-12 NOTE — PROGRESS NOTES
Chief Complaint   Patient presents with    Routine Prenatal Visit     Visit Vitals  /85   Wt 169 lb 12.8 oz (77 kg)   LMP  (LMP Unknown)   BMI 32.08 kg/m²     1. Have you been to the ER, urgent care clinic since your last visit? Hospitalized since your last visit? No    2. Have you seen or consulted any other health care providers outside of the [address] [place] [address] since your last visit? Include any pap smears or colon screening. No     Here today for a routine prenatal visit and she has no complaints or concerns. Episode printed for patient so that she may take with her to [place] to visit with her dad whom is in ICU with multiple health issues at this time.

## 2019-03-26 ENCOUNTER — ROUTINE PRENATAL (OUTPATIENT)
Dept: OBGYN CLINIC | Age: 29
End: 2019-03-26

## 2019-03-26 VITALS — SYSTOLIC BLOOD PRESSURE: 126 MMHG | WEIGHT: 173 LBS | DIASTOLIC BLOOD PRESSURE: 81 MMHG | BODY MASS INDEX: 32.69 KG/M2

## 2019-03-26 DIAGNOSIS — Z34.83 ENCOUNTER FOR SUPERVISION OF OTHER NORMAL PREGNANCY IN THIRD TRIMESTER: Primary | ICD-10-CM

## 2019-03-26 DIAGNOSIS — Z23 ENCOUNTER FOR IMMUNIZATION: ICD-10-CM

## 2019-03-26 DIAGNOSIS — Z3A.32 32 WEEKS GESTATION OF PREGNANCY: ICD-10-CM

## 2019-03-26 NOTE — PROGRESS NOTES
S: Feeling well. No significant complaints or concerns. Reports consistent, daily fetal mvmt. Denies ctxs, LOF, or vaginal bldng. Denies travel to [place] affected area. Father is recovering from severe illness. She traveled out [facility] to be with him. He plans to move to [place] so he will be closer. O: See prenatal flowsheet for maternal and fetal exam  unknown if currently breastfeeding. A:  32w4d   Size=Dates    P: Tdap today  See prenatal checklist for other topics covered during today's visit  RTO in 2 weeks for CHRISTEN with CNM    Discussed USPFT recommendation for Tdap during every pregnancy, optimal timing of administration between 27 to 36 weeks gestation-although can be done at any time during pregnancy, and that people that will be in close contact with her infant during the first year should also receive a Tdap vaccine if they have not previously received the Tdap vaccine. Pt states understanding. Pt received Tdap vaccine today.

## 2019-03-26 NOTE — PROGRESS NOTES
After obtaining consent, and per orders of martin bhakta cnm, injection of tdap given in right deltoid by Luiz Carolina RN. Patient instructed to remain in clinic for 20 minutes afterwards, and to report any adverse reaction to me immediately.  Lot: Kelly Chinchilla Exp: 6/1/21 [facility]: [id number]

## 2019-04-09 ENCOUNTER — ROUTINE PRENATAL (OUTPATIENT)
Dept: OBGYN CLINIC | Age: 29
End: 2019-04-09

## 2019-04-09 VITALS — SYSTOLIC BLOOD PRESSURE: 120 MMHG | DIASTOLIC BLOOD PRESSURE: 81 MMHG | WEIGHT: 173 LBS | BODY MASS INDEX: 32.69 KG/M2

## 2019-04-09 DIAGNOSIS — Z34.83 ENCOUNTER FOR SUPERVISION OF OTHER NORMAL PREGNANCY IN THIRD TRIMESTER: Primary | ICD-10-CM

## 2019-04-09 NOTE — PROGRESS NOTES
S: Feeling well. No significant complaints or concerns. Reports consistent, daily fetal mvmt. Denies ctxs, LOF, or vaginal bldng. [facility]. Got TDAP at last visit. Feeling calm about everything but concerns about having c/s. O: See prenatal flowsheet for maternal and fetal exam.   Blood pressure 120/81, weight 173 lb (78.5 kg), unknown if currently breastfeeding. A:  34w4d   Size=Dates    P: GBS at next visit.   See prenatal checklist for other topics covered during today's visit  RTO in 2 weeks for CHRISTEN with OLIVER

## 2019-04-09 NOTE — PROGRESS NOTES
Chief Complaint   Patient presents with    Routine Prenatal Visit     Visit Vitals  /81   Wt 173 lb (78.5 kg)   LMP  (LMP Unknown)   BMI 32.69 kg/m²     1. Have you been to the ER, urgent care clinic since your last visit? Hospitalized since your last visit? No    2. Have you seen or consulted any other health care providers outside of the [address] [place] [address] since your last visit? Include any pap smears or colon screening. No     Here today for a routine prenatal visit and she has no complaints or concerns.

## 2019-04-23 ENCOUNTER — ROUTINE PRENATAL (OUTPATIENT)
Dept: OBGYN CLINIC | Age: 29
End: 2019-04-23

## 2019-04-23 VITALS — DIASTOLIC BLOOD PRESSURE: 87 MMHG | WEIGHT: 173 LBS | SYSTOLIC BLOOD PRESSURE: 129 MMHG | BODY MASS INDEX: 32.69 KG/M2

## 2019-04-23 DIAGNOSIS — Z34.83 ENCOUNTER FOR SUPERVISION OF OTHER NORMAL PREGNANCY IN THIRD TRIMESTER: Primary | ICD-10-CM

## 2019-04-23 NOTE — PATIENT INSTRUCTIONS
Group B Strep During Pregnancy: Care Instructions  Your Care Instructions    Group B strep infection is caused by a type of bacteria. It's a different kind of bacteria than the kind that causes strep throat. You may have this kind of bacteria in your body. Sometimes it may cause an infection, but most of the time it doesn't make you sick or cause symptoms. But if you pass the bacteria to your baby during the birth, it can cause serious health problems for your baby. If you have this bacteria in your body, you will get antibiotics when you are in labor. Antibiotics help prevent problems for a  baby. After birth, doctors will watch and may test your baby. If your baby tests positive for Group B strep, he or she will get antibiotics. If you plan to breastfeed your baby, don't worry. It will be safe to breastfeed. Follow-up care is a key part of your treatment and safety. Be sure to make and go to all appointments, and call your doctor if you are having problems. It's also a good idea to know your test results and keep a list of the medicines you take. How can you care for yourself at home? · If your doctor has prescribed antibiotics, take them as directed. Do not stop taking them just because you feel better. You need to take the full course of antibiotics. · Tell your doctor if you are allergic to any antibiotic. · If your water breaks, go to the hospital right away. Your doctor will give you antibiotics to help protect your baby from infection. · Tell the doctors and nurses at the hospital that you tested positive for group B strep. When should you call for help? Call your doctor now or seek immediate medical care if:    · You have symptoms of a urinary tract infection. These may include:  ? Pain or burning when you urinate. ? A frequent need to urinate without being able to pass much urine.   ? Pain in the flank, which is just below the rib cage and above the waist on either side of the back.  ? Blood in your urine. ? A fever.     · You think your water has broken.     · You have pain in your belly or pelvis.    Watch closely for changes in your health, and be sure to contact your doctor if you have any problems. Where can you learn more? Go to http://henrique-cesar.info/. Enter M001 in the search box to learn more about \"Group B Strep During Pregnancy: Care Instructions. \"  Current as of: September 5, 2018  Content Version: 11.9  © 1997-8581 [employer]. Care instructions adapted under license by [employer] (which disclaims liability or warranty for this information). If you have questions about a medical condition or this instruction, always ask your healthcare professional. [address] any warranty or liability for your use of this information.

## 2019-04-23 NOTE — PROGRESS NOTES
Chief Complaint   Patient presents with    Routine Prenatal Visit     36w2d     Visit Vitals  /87   Wt 173 lb (78.5 kg)   LMP  (LMP Unknown)   BMI 32.69 kg/m²     1. Have you been to the ER, urgent care clinic since your last visit? Hospitalized since your last visit? No    2. Have you seen or consulted any other health care providers outside of the [address] [place] [address] since your last visit? Include any pap smears or colon screening. No     Here today for a routine prenatal visit and she has no complaints or concerns.   Verbal order for gbs per toñito cordova cnm

## 2019-04-23 NOTE — PROGRESS NOTES
S: Feeling well. No significant complaints or concerns except some return of nausea and sciatica. Declines option of PT, using exercises with some relief. Reports consistent, daily fetal mvmt. Denies ctxs, LOF, or vaginal bldng. Denies travel to [place] affected area. Travel precautions reviewed. O: See prenatal flowsheet for maternal and fetal exam. GBS collected by provider. Blood pressure 129/87, weight 173 lb (78.5 kg), unknown if currently breastfeeding. A:  36w4d   Size=Dates    P: GBS collected. Labor precautions stressed.    Using TUMS PRN  See prenatal checklist for other topics covered during today's visit  RTO in 1 weeks for CHRISTEN with CNM

## 2019-04-25 LAB
GP B STREP DNA SPEC QL NAA+PROBE: POSITIVE
GRBS, EXTERNAL: POSITIVE

## 2019-04-29 ENCOUNTER — ROUTINE PRENATAL (OUTPATIENT)
Dept: OBGYN CLINIC | Age: 29
End: 2019-04-29

## 2019-04-29 VITALS — BODY MASS INDEX: 32.69 KG/M2 | DIASTOLIC BLOOD PRESSURE: 86 MMHG | WEIGHT: 173 LBS | SYSTOLIC BLOOD PRESSURE: 121 MMHG

## 2019-04-29 DIAGNOSIS — Z34.83 ENCOUNTER FOR SUPERVISION OF OTHER NORMAL PREGNANCY IN THIRD TRIMESTER: Primary | ICD-10-CM

## 2019-04-29 NOTE — PROGRESS NOTES
S: Feeling well. No significant complaints or concerns except hip and lower back pain . Reports consistent, daily fetal mvmt. Denies ctxs, LOF, or vaginal bldng. Denies travel to [facility] affected area. O: See prenatal flowsheet for maternal and fetal exam. Exercises given for relief for back issues and hip discomfort. Declines PT at this time. Blood pressure 121/86, weight 173 lb (78.5 kg), unknown if currently breastfeeding. A:  37w3d   Size=Dates    P: Reviewed GBS positive in detail and early labor precautions.    See prenatal checklist for other topics covered during today's visit  RTO in 1 weeks for CHRISTEN with CNM

## 2019-04-29 NOTE — PROGRESS NOTES
Chief Complaint   Patient presents with    Routine Prenatal Visit     Visit Vitals  /86   Wt 173 lb (78.5 kg)   LMP  (LMP Unknown)   BMI 32.69 kg/m²     ----- recheck of BP is 121/86    1. Have you been to the ER, urgent care clinic since your last visit? Hospitalized since your last visit? No    2. Have you seen or consulted any other health care providers outside of the [address] [place] [address] since your last visit? Include any pap smears or colon screening. No     Here today for a routine prenatal visit and she has no complaints or concerns.

## 2019-05-06 ENCOUNTER — ROUTINE PRENATAL (OUTPATIENT)
Dept: OBGYN CLINIC | Age: 29
End: 2019-05-06

## 2019-05-06 VITALS — DIASTOLIC BLOOD PRESSURE: 86 MMHG | SYSTOLIC BLOOD PRESSURE: 128 MMHG | BODY MASS INDEX: 32.88 KG/M2 | WEIGHT: 174 LBS

## 2019-05-06 DIAGNOSIS — Z34.83 ENCOUNTER FOR SUPERVISION OF OTHER NORMAL PREGNANCY IN THIRD TRIMESTER: Primary | ICD-10-CM

## 2019-05-06 NOTE — PROGRESS NOTES
S: Feeling well. No significant complaints or concerns. Reports consistent, daily fetal mvmt. Denies ctxs, LOF, or vaginal bldng. Denies travel to [facility] affected area. Sciatica and pelvic pain improved after doing exercises. O: See prenatal flowsheet for maternal and fetal exam. Requesting cervical exam.   Blood pressure 128/86, weight 174 lb (78.9 kg), unknown if currently breastfeeding. A:  38w3d   Size=Dates    P: FKCS stressed. Labor precautions reviewed.   See prenatal checklist for other topics covered during today's visit  RTO in 1 weeks for CHRISTEN with CNGABY

## 2019-05-06 NOTE — PATIENT INSTRUCTIONS
Group B Strep During Pregnancy: Care Instructions  Your Care Instructions    Group B strep infection is caused by a type of bacteria. It's a different kind of bacteria than the kind that causes strep throat. You may have this kind of bacteria in your body. Sometimes it may cause an infection, but most of the time it doesn't make you sick or cause symptoms. But if you pass the bacteria to your baby during the birth, it can cause serious health problems for your baby. If you have this bacteria in your body, you will get antibiotics when you are in labor. Antibiotics help prevent problems for a  baby. After birth, doctors will watch and may test your baby. If your baby tests positive for Group B strep, he or she will get antibiotics. If you plan to breastfeed your baby, don't worry. It will be safe to breastfeed. Follow-up care is a key part of your treatment and safety. Be sure to make and go to all appointments, and call your doctor if you are having problems. It's also a good idea to know your test results and keep a list of the medicines you take. How can you care for yourself at home? · If your doctor has prescribed antibiotics, take them as directed. Do not stop taking them just because you feel better. You need to take the full course of antibiotics. · Tell your doctor if you are allergic to any antibiotic. · If your water breaks, go to the hospital right away. Your doctor will give you antibiotics to help protect your baby from infection. · Tell the doctors and nurses at the hospital that you tested positive for group B strep. When should you call for help? Call your doctor now or seek immediate medical care if:    · You have symptoms of a urinary tract infection. These may include:  ? Pain or burning when you urinate. ? A frequent need to urinate without being able to pass much urine.   ? Pain in the flank, which is just below the rib cage and above the waist on either side of the back.  ? Blood in your urine. ? A fever.     · You think your water has broken.     · You have pain in your belly or pelvis.    Watch closely for changes in your health, and be sure to contact your doctor if you have any problems. Where can you learn more? Go to http://henrique-cesar.info/. Enter M001 in the search box to learn more about \"Group B Strep During Pregnancy: Care Instructions. \"  Current as of: 2018  Content Version: 11.9  © 9385-7127 [employer]. Care instructions adapted under license by [employer] (which disclaims liability or warranty for this information). If you have questions about a medical condition or this instruction, always ask your healthcare professional. [address] any warranty or liability for your use of this information. Week 38 of Your Pregnancy: Care Instructions  Your Care Instructions    Believe it or not, your baby is almost here. You may have ideas about your baby's personality because of how much he or she moves. Or you may have noticed how he or she responds to sounds, warmth, cold, and light. You may even know what kind of music your baby likes. By now, you have a better idea of what to expect during delivery. You may have talked about your birth preferences with your doctor. But even if you want a vaginal birth, it is a good idea to learn about  births.  birth means that your baby is born through a cut (incision) in your lower belly. It is sometimes the best choice for the health of the baby and the mother. This care sheet can help you understand  births. It also gives you information about what to expect after your baby is born. And it helps you understand more about postpartum depression. Follow-up care is a key part of your treatment and safety. Be sure to make and go to all appointments, and call your doctor if you are having problems.  It's also a good idea to know your test results and keep a list of the medicines you take. How can you care for yourself at home? Learn about  birth  · Most C-sections are unplanned. They are done because of problems that occur during labor. These problems might include:  ? Labor that slows or stops. ? High blood pressure or other problems for the mother. ? Signs of distress in the baby. These signs may include a very fast or slow heart rate. · Although most mothers and babies do well after , it is major surgery. It has more risks than a vaginal delivery. · In some cases, a planned  may be safer than a vaginal delivery. This may be the case if:  ? The mother has a health problem, such as a heart condition. ? The baby isn't in a head-down position for delivery. This is called a breech position. ? The uterus has scars from past surgeries. This could increase the chance of a tear in the uterus. ? There is a problem with the placenta. ? The mother has an infection, such as genital herpes, that could be spread to the baby. ? The mother is having twins or more. ? The baby weighs 9 to 10 pounds or more. · Because of the risks of , planned C-sections generally should be done only for medical reasons. And a planned  should be done at 39 weeks or later unless there is a medical reason to do it sooner. Know what to expect after delivery, and plan for the first few weeks at home  · You, your baby, and your partner or  will get identification bands. Only people with matching bands can  the baby from the nursery. · You will learn how to feed, diaper, and bathe your baby. And you will learn how to care for the umbilical cord stump. If your baby will be circumcised, you will also learn how to care for that. · Ask people to wait to visit you until you are at home. And ask them to wash their hands before they touch your baby.   · Make sure you have another adult in your home for at least 2 or 3 days after the birth. · During the first 2 weeks, limit when friends and family can visit. · Do not allow visitors who have colds or infections. Make sure all visitors are up to date with their vaccinations. Never let anyone smoke around your baby. · Try to nap when the baby naps. Be aware of postpartum depression  · \"Baby blues\" are common for the first 1 to 2 weeks after birth. You may cry or feel sad or irritable for no reason. · For some women, these feelings last longer and are more intense. This is called postpartum depression. · If your symptoms last for more than a few weeks or you feel very depressed, ask your doctor for help. · Postpartum depression can be treated. Support groups and counseling can help. Sometimes medicine can also help. Where can you learn more? Go to http://henrique-cesar.info/. Enter B044 in the search box to learn more about \"Week 38 of Your Pregnancy: Care Instructions. \"  Current as of: September 5, 2018  Content Version: 11.9  © 2160-4524 [employer]. Care instructions adapted under license by [employer] (which disclaims liability or warranty for this information). If you have questions about a medical condition or this instruction, always ask your healthcare professional. [address] any warranty or liability for your use of this information.

## 2019-05-06 NOTE — PROGRESS NOTES
Chief Complaint   Patient presents with    Routine Prenatal Visit     38w3d     Visit Vitals  /86   Wt 174 lb (78.9 kg)   LMP  (LMP Unknown)   BMI 32.88 kg/m²     1. Have you been to the ER, urgent care clinic since your last visit? Hospitalized since your last visit? No    2. Have you seen or consulted any other health care providers outside of the [address] [place] [address] since your last visit? Include any pap smears or colon screening. No     Here today for a routine prenatal visit and she has complaints of more pressure and increase in radha ontiveros.

## 2019-05-15 ENCOUNTER — ROUTINE PRENATAL (OUTPATIENT)
Dept: OBGYN CLINIC | Age: 29
End: 2019-05-15

## 2019-05-15 VITALS
WEIGHT: 177.2 LBS | DIASTOLIC BLOOD PRESSURE: 70 MMHG | HEIGHT: 61 IN | SYSTOLIC BLOOD PRESSURE: 128 MMHG | BODY MASS INDEX: 33.46 KG/M2

## 2019-05-15 DIAGNOSIS — Z34.83 ENCOUNTER FOR SUPERVISION OF OTHER NORMAL PREGNANCY IN THIRD TRIMESTER: Primary | ICD-10-CM

## 2019-05-15 NOTE — PROGRESS NOTES
S: Feeling well. No significant complaints or concerns. Reports consistent, daily fetal mvmt. Denies ctxs, LOF, or vaginal bldng. Ready to have a baby!!! Denies travel to [facility] affected area. O: See prenatal flowsheet for maternal and fetal exam  Blood pressure 128/70, height 5' 1\" (1.549 m), weight 177 lb 3.2 oz (80.4 kg), unknown if currently breastfeeding.     A:  39w5d   Size=Dates    P: Discussed postdates management  See prenatal checklist for other topics covered during today's visit  RTO in 1 weeks for CHRISTEN with OLIVER

## 2019-05-22 ENCOUNTER — ROUTINE PRENATAL (OUTPATIENT)
Dept: OBGYN CLINIC | Age: 29
End: 2019-05-22

## 2019-05-22 VITALS — BODY MASS INDEX: 33.63 KG/M2 | SYSTOLIC BLOOD PRESSURE: 134 MMHG | DIASTOLIC BLOOD PRESSURE: 87 MMHG | WEIGHT: 178 LBS

## 2019-05-22 DIAGNOSIS — O48.0 POST-TERM PREGNANCY, 40-42 WEEKS OF GESTATION: Primary | ICD-10-CM

## 2019-05-22 DIAGNOSIS — O40.3XX0 POLYHYDRAMNIOS AFFECTING PREGNANCY IN THIRD TRIMESTER: ICD-10-CM

## 2019-05-22 NOTE — PROGRESS NOTES
S: Feeling well. No significant complaints or concerns. Reports consistent, daily fetal mvmt. Denies ctxs, LOF, or vaginal bldng. [facility]. O: See prenatal flowsheet for maternal and fetal exam.  BPP done today in ultrasound and polyhydramnios noted with MAGALY of 30. BPP 10/10  Blood pressure 134/87, weight 178 lb (80.7 kg), unknown if currently breastfeeding. A:  40w5d   Size=Dates    P: IOL scheduled tomorrow for post pete and poly at 0700. Reassurance given. FKCS stressed. See prenatal checklist for other topics covered during today's visit  RT LD tomorrow at 0700 for IOL    NST Inpatient procedure note    Andrew Hemphill presents for fetal non-stress test.  Indication is post pete and poly. She is 40w5d. She has been monitored for 37 minutes. The FHR was reactive, with accelerations. NST Interpretation:    FHR baseline 135 bpm, variability moderate. Accelerations present. Decelerations Absent. Uterine contractions were present. Assessment    NST is reactive. NST is reassuring. Patient does not need admission/observation for further monitoring. Desirae Mesa was informed of the NST results and her questions were answered. A/P   - Release to home with laborprecautions and FKCs   - Admit to iL&D for in AM for IOL at 0700 if undelivered.

## 2019-05-23 ENCOUNTER — HOSPITAL ENCOUNTER (INPATIENT)
Age: 29
LOS: 2 days | Discharge: HOME | DRG: 560 | End: 2019-05-25
Attending: OBSTETRICS & GYNECOLOGY | Admitting: ADVANCED PRACTICE MIDWIFE
Payer: MEDICAID

## 2019-05-23 PROBLEM — O40.3XX0 POLYHYDRAMNIOS AFFECTING PREGNANCY IN THIRD TRIMESTER: Status: ACTIVE | Noted: 2019-05-23

## 2019-05-23 PROBLEM — O40.9XX0: Status: ACTIVE | Noted: 2019-05-23

## 2019-05-23 PROBLEM — O43.109: Status: ACTIVE | Noted: 2019-05-23

## 2019-05-23 LAB
BASOPHILS # BLD: 0.1 K/UL (ref 0–0.1)
BASOPHILS NFR BLD: 1 % (ref 0–1)
DIFFERENTIAL METHOD BLD: ABNORMAL
EOSINOPHIL # BLD: 0.2 K/UL (ref 0–0.4)
EOSINOPHIL NFR BLD: 1 % (ref 0–7)
ERYTHROCYTE [DISTWIDTH] IN BLOOD BY AUTOMATED COUNT: 13.9 % (ref 11.5–14.5)
HCT VFR BLD AUTO: 37.6 % (ref 35–47)
HGB BLD-MCNC: 12.7 G/DL (ref 11.5–16)
IMM GRANULOCYTES # BLD AUTO: 0.1 K/UL (ref 0–0.04)
IMM GRANULOCYTES NFR BLD AUTO: 1 % (ref 0–0.5)
LYMPHOCYTES # BLD: 3.2 K/UL (ref 0.8–3.5)
LYMPHOCYTES NFR BLD: 18 % (ref 12–49)
MCH RBC QN AUTO: 32.8 PG (ref 26–34)
MCHC RBC AUTO-ENTMCNC: 33.8 G/DL (ref 30–36.5)
MCV RBC AUTO: 97.2 FL (ref 80–99)
MONOCYTES # BLD: 1.2 K/UL (ref 0–1)
MONOCYTES NFR BLD: 7 % (ref 5–13)
NEUTS SEG # BLD: 12.5 K/UL (ref 1.8–8)
NEUTS SEG NFR BLD: 72 % (ref 32–75)
NRBC # BLD: 0 K/UL (ref 0–0.01)
NRBC BLD-RTO: 0 PER 100 WBC
PLATELET # BLD AUTO: 303 K/UL (ref 150–400)
PMV BLD AUTO: 11 FL (ref 8.9–12.9)
RBC # BLD AUTO: 3.87 M/UL (ref 3.8–5.2)
WBC # BLD AUTO: 17.3 K/UL (ref 3.6–11)

## 2019-05-23 PROCEDURE — 75410000002 HC LABOR FEE PER 1 HR: Performed by: ADVANCED PRACTICE MIDWIFE

## 2019-05-23 PROCEDURE — 65270000029 HC RM PRIVATE

## 2019-05-23 PROCEDURE — 75410000003 HC RECOV DEL/VAG/CSECN EA 0.5 HR: Performed by: ADVANCED PRACTICE MIDWIFE

## 2019-05-23 PROCEDURE — 74011250637 HC RX REV CODE- 250/637

## 2019-05-23 PROCEDURE — 36415 COLL VENOUS BLD VENIPUNCTURE: CPT

## 2019-05-23 PROCEDURE — 75410000000 HC DELIVERY VAGINAL/SINGLE: Performed by: ADVANCED PRACTICE MIDWIFE

## 2019-05-23 PROCEDURE — 74011250636 HC RX REV CODE- 250/636: Performed by: ADVANCED PRACTICE MIDWIFE

## 2019-05-23 PROCEDURE — 85025 COMPLETE CBC W/AUTO DIFF WBC: CPT

## 2019-05-23 PROCEDURE — 10907ZC DRAINAGE OF AMNIOTIC FLUID, THERAPEUTIC FROM PRODUCTS OF CONCEPTION, VIA NATURAL OR ARTIFICIAL OPENING: ICD-10-PCS | Performed by: ADVANCED PRACTICE MIDWIFE

## 2019-05-23 PROCEDURE — 74011000258 HC RX REV CODE- 258: Performed by: ADVANCED PRACTICE MIDWIFE

## 2019-05-23 PROCEDURE — 74011250636 HC RX REV CODE- 250/636: Performed by: MIDWIFE

## 2019-05-23 PROCEDURE — 74011250637 HC RX REV CODE- 250/637: Performed by: MIDWIFE

## 2019-05-23 RX ORDER — LIDOCAINE HYDROCHLORIDE 10 MG/ML
INJECTION INFILTRATION; PERINEURAL
Status: DISCONTINUED
Start: 2019-05-23 | End: 2019-05-23 | Stop reason: WASHOUT

## 2019-05-23 RX ORDER — HYDROCODONE BITARTRATE AND ACETAMINOPHEN 5; 325 MG/1; MG/1
1 TABLET ORAL
Status: DISCONTINUED | OUTPATIENT
Start: 2019-05-23 | End: 2019-05-25 | Stop reason: HOSPADM

## 2019-05-23 RX ORDER — MISOPROSTOL 200 UG/1
TABLET ORAL
Status: COMPLETED
Start: 2019-05-23 | End: 2019-05-23

## 2019-05-23 RX ORDER — HYDROCORTISONE ACETATE PRAMOXINE HCL 2.5; 1 G/100G; G/100G
CREAM TOPICAL AS NEEDED
Status: DISCONTINUED | OUTPATIENT
Start: 2019-05-23 | End: 2019-05-25 | Stop reason: HOSPADM

## 2019-05-23 RX ORDER — IBUPROFEN 800 MG/1
800 TABLET ORAL EVERY 8 HOURS
Status: DISCONTINUED | OUTPATIENT
Start: 2019-05-23 | End: 2019-05-25 | Stop reason: HOSPADM

## 2019-05-23 RX ORDER — OXYTOCIN/RINGER'S LACTATE 20/1000 ML
125-500 PLASTIC BAG, INJECTION (ML) INTRAVENOUS ONCE
Status: COMPLETED | OUTPATIENT
Start: 2019-05-23 | End: 2019-05-23

## 2019-05-23 RX ORDER — ACETAMINOPHEN 325 MG/1
650 TABLET ORAL
Status: DISCONTINUED | OUTPATIENT
Start: 2019-05-23 | End: 2019-05-25 | Stop reason: HOSPADM

## 2019-05-23 RX ORDER — CEFAZOLIN SODIUM/WATER 2 G/20 ML
2 SYRINGE (ML) INTRAVENOUS ONCE
Status: COMPLETED | OUTPATIENT
Start: 2019-05-23 | End: 2019-05-23

## 2019-05-23 RX ORDER — SWAB
1 SWAB, NON-MEDICATED MISCELLANEOUS DAILY
Status: DISCONTINUED | OUTPATIENT
Start: 2019-05-24 | End: 2019-05-25 | Stop reason: HOSPADM

## 2019-05-23 RX ORDER — SODIUM CHLORIDE, SODIUM LACTATE, POTASSIUM CHLORIDE, CALCIUM CHLORIDE 600; 310; 30; 20 MG/100ML; MG/100ML; MG/100ML; MG/100ML
125 INJECTION, SOLUTION INTRAVENOUS CONTINUOUS
Status: DISCONTINUED | OUTPATIENT
Start: 2019-05-23 | End: 2019-05-23

## 2019-05-23 RX ADMIN — SODIUM CHLORIDE, SODIUM LACTATE, POTASSIUM CHLORIDE, AND CALCIUM CHLORIDE 125 ML/HR: 600; 310; 30; 20 INJECTION, SOLUTION INTRAVENOUS at 07:38

## 2019-05-23 RX ADMIN — MISOPROSTOL 800 MCG: 200 TABLET ORAL at 15:04

## 2019-05-23 RX ADMIN — PENICILLIN G POTASSIUM 2.5 MILLION UNITS: 20000000 POWDER, FOR SOLUTION INTRAVENOUS at 11:36

## 2019-05-23 RX ADMIN — Medication 2 G: at 17:32

## 2019-05-23 RX ADMIN — Medication 10000 MILLI-UNITS/HR: at 15:09

## 2019-05-23 RX ADMIN — SODIUM CHLORIDE 5 MILLION UNITS: 900 INJECTION, SOLUTION INTRAVENOUS at 07:42

## 2019-05-23 RX ADMIN — IBUPROFEN 800 MG: 800 TABLET ORAL at 15:03

## 2019-05-23 NOTE — PROGRESS NOTES
1920:  Bedside shift report received from [address], RN. RN assumes care of patient at this time. RN assisted patient to restroom without incidence. 0130: Bedside and Verbal shift change report given to ENRIQUE Alvarez (oncoming nurse) by JULIANE Valencia RN (offgoing nurse). Report included the following information SBAR, Kardex, Intake/Output, MAR, Accordion and Recent Results.

## 2019-05-23 NOTE — PROGRESS NOTES
[facility] Labor Progress Note     Patient: Kenyatta Leonard MRN: [id number]  SSN: [id number]    YOB: 1990  Age: 25 y.o. Sex: female        Subjective:   Patient coping well with contractions. Reports contractions about every 6 min. Irregular. Objective:     Patient Vitals for the past 4 hrs:   Temp Pulse Resp BP   19 0920  78 14 135/85   19 0803  93 14 125/72   19 0715 98.5 °F (36.9 °C) (!) 110 14 129/84       No new FHR assessment or SVE.         Assessment:   Intrauterine pregnancy at term  Polyhydramnios  IOL         Plan:   Continue current orders/management   Encourage nipple stimulation  Review POC - ROM vs pitocin  CNM management   Anticipate     Mónica Fitzpatrick CNM

## 2019-05-23 NOTE — H&P
History & Physical    Name: Krysta Pope MRN: [id number]  SSN: [id number]    YOB: 1990  Age: 25 y.o. Sex: female        Subjective: Wendy Vazquez is a 24 YO  at 40+6 weeks gestation admitted for IOL for post pete and polyhydramnios noted on BPP yesterday. Her overall BPP score was 10/10 on yesterday. She does not have anemia. Estimated Date of Delivery: 19  OB History    Para Term  AB Living   4 3 3     3   SAB TAB Ectopic Molar Multiple Live Births           0 3      # Outcome Date GA Lbr Rashard/2nd Weight Sex Delivery Anes PTL Lv   4 Current            3 Term 18 39w3d  7 lb 5.1 oz (3.32 kg) M Vag-Spont None Y JOSHUA      Complications: Shoulder Dystocia   2 Term 08/06/15    M Vag-Spont EPIDURAL AN N JOSHUA      Birth Comments: IOL @ 41w3d   1 Term 10/08/13    F Vag-Spont EPIDURAL AN N JOSHUA      Birth Comments: IOL at 41w3d      Obstetric Comments   Menarche 13, LMP , # of children 3, age of 4st delivery 20, Hysterectomy/oophorectomy no/no, Breast bx no, history of breast feeding yes, BCP yes, Hormone therapy no       Ms. Amaya Del Cid is admitted with pregnancy at 40w6d for Increasingly painful contractions. Prenatal course was complicated by polyhydramnios. Please see prenatal records for details. No past medical history on file.   Past Surgical History:   Procedure Laterality Date    HX WISDOM TEETH EXTRACTION      HX WRIST FRACTURE TX Left      Social History     Occupational History     Comment: [employer]   Tobacco Use    Smoking status: Former Smoker     Packs/day: 0.50     Years: 7.00     Pack years: 3.50     Types: Cigarettes     Last attempt to quit: 2018     Years since quittin.4    Smokeless tobacco: Never Used    Tobacco comment: restarted smoking   Substance and Sexual Activity    Alcohol use: No    Drug use: No    Sexual activity: Yes     Partners: Male     Birth control/protection: None     Comment: in relationship x 7 yrs, going well     Family History   Problem Relation Age of Onset    Other Mother     Heart Attack Mother 55    Cancer Mother         thyroid    Heart Attack Father 52        low blood pressure, pace maker    No Known Problems Sister     Heart Disease Maternal Grandmother     Cancer Maternal Grandfather 66        pancreatic       Allergies   Allergen Reactions    Bee Sting [Sting, Bee] Swelling     Prior to Admission medications    Medication Sig Start Date End Date Taking? Authorizing Provider   PNV ZF.45/[id number] FUM/FOLIC AC (PRENATAL PO) Take  by mouth. Yes Provider, Historical        Review of Systems: A comprehensive review of systems was negative except for that written in the HPI. Objective:     Vitals:  Vitals:    19 0745   Weight: 178 lb (80.7 kg)   Height: 5' 1\" (1.549 m)        Physical Exam:  Patient without distress. Breast: normal breast exam  Heart: Regular rate and rhythm, S1S2 present or without murmur or extra heart sounds  Lung: clear to auscultation throughout lung fields, no wheezes, no rales, no rhonchi and normal respiratory effort  Back: costovertebral angle tenderness absent  Abdomen: soft, nontender  Fundus: soft and non tender  Perineum: blood show, amniotic fluid absent  Cervical Exam: 4 cm dilated    80% effaced    -3 station    Presenting Part: cephalic  Cervical Position: posterior  Consistency: Soft  Flores Score:7  Lower Extremities:  - Edema No  Membranes:  Intact  Fetal Heart Rate: Reactive  Baseline: 155 per minute  Variability: moderate  Accelerations: yes  Decelerations: none  Uterine contractions: irregular.     Prenatal Labs:   Lab Results   Component Value Date/Time    ABO/Rh(D) A POSITIVE 03/15/2018 03:01 PM    GrBStrep, External POSITIVE 2019         Assessment/Plan:     Active Problems:    Pregnancy (3/13/2018)      Overview:       Collaborating MD      Centering declined      [place] by: Diamante Thrasher screening declined      Social: Lives with , Catarino Shaikh, and three children       NOB labs WNL      20 wk anatomy WNL, XY, needed f/u for heart views which were seen and normal      normal at 25 weeks gestation      Gtt- normal      Labor plans: Larry labor support, childcare with bestfriend      PP support:  Larry Shaneap    Flu      Circ: desires      Labor Plans; NCB      Medical risk factors:       issues of concern:      Family planning: vasectomy                  Polyhydramnios affecting pregnancy in third trimester (2019)         Plan: Admit for Reassuring fetal status,, Continue plan for vaginal delivery, IOL as needed  Group B Strep was positive, will treat prophylactically with penicillin. Start pitocin after PCN completed due to hx of fast labors.

## 2019-05-23 NOTE — PROGRESS NOTES
Called to evaluate patient for increased bleeding post partum. Uterus firm to palpation. Bladder does not feel distended on palpation. Clots expressed. Manual exploration of uterus done while patient using nitrous oxide and more clots removed. No membranes noted. Informal bedside ultrasound done and endometrial strip present and minimal clots present. Dr. aRvi Valles called to evaluate. Patient prefers to get up to void with assistance vs st cath. Bleeding improved. Will give Cytotec 800 mg PV after evaluation by Dr. Ravi Valles.

## 2019-05-23 NOTE — PROGRESS NOTES
1298 - Pt arrived on labor and delivery at this time and admitted to room 212. Changed into hospital gown and repositioned self in bed.  0715 - This RN at bedside to see pt. EFM x 2 applied. VS and admission assessment done at this time. 0725 - SLIV placed in left hand at this time and labs drawn per orders/sent to lab for processing. 0734 to 3632 - Pt oob to bathroom; void without difficult. Aston Fisher CNM at bedside to see pt.   4333 - SVE by Aston Fisher CNM at this time (4/80/ballottable). 80 - FHT reactive. Pt off EFM x 2 at this time per instruction from JULIANE Atwood, [address] - Rounded on pt at this time; resting quietly in bed and denies needs. Spouse at bedside. 7832 - Pt back on EFM for NST at this time. [address] verified reactive with this RN and second RN Jamee Farmer. Pt off EFM at this time to ambulate in room or in dunn. Also offered shower, birth ball to pt.   0920 - RN at bedside. Pt denies needs. Offered PO fluids; declined by pt. Coping well with ctx and declines pain medication and epidural.   5375 - Verbal order from Dena Saeed CNM at this time to assess FHT with doppler only (unless problem or decels noted) once per hour while pt in latent phase labor and every 30 minutes once in active labor. 1009 to 1014 - FHT assessed at this time via doppler, one minute prior to ctx, through ctx, and one minute after ctx. FHT baseline 140. No decels noted. Accel up to 151. Moderate ctx palpated for roughly 70 seconds. RN at bedside. Pt coping well with ctx and denies needs. Pt performing nipple stim, per CNM instructions. 1106 - Rounded on pt at this time. Pt oob to bathroom; void without difficulty. Pt reports diarrhea x 3. Coping well with ctx, denies needs. Spouse at bedside. 1108 to 1113 - FHT assessed via doppler, two minute prior to ctx, through ctx, and one minute after ctx. FHT baseline 135. No decels noted. Accel up to 148.  Moderate ctx palpated for roughly 60 seconds. RN at bedside. 45 Madhuri Enciso CNM and Justin Begum CNM at bedside at this time, SVE (8/90/ballottable). CNMs offered pt AROM at this time; pt declined. CNMs used us for [address] [address] at this time, reviewed tracing. FHT auscultated through ctx and for one minute after. No decels noted. 1200 - Bedside, Verbal and Written shift change report given to Олег Ballard RN (oncoming nurse) by this RN, Gaetano Walters (offgoing nurse). Report included the following information SBAR, Kardex, Intake/Output, MAR, Recent Results and Med Rec Status. Relinquished care of pt at this time.

## 2019-05-23 NOTE — PROGRESS NOTES
1150:  Assumed care of the patient. 1200:  Fetal heart tones via doppler. Pt is ambulating in the room with  at the bedside. 1230:  Fetal heart tones, Lisseth Fowler at the bedside. 1238:  AROM, clear. 1248:  Delivery of live male infant. 1248:  QBL:  315, difficult to determine as pt delivered at the bedside, standing. 1430:  Gerald Ahumada at the bedside to evaluate pts bleeding. Clots expressed weighing 190cc. Pt ambulated to the bathroom and voided 200cc. 1435:  Dr. Ben Spivey to the bedside to evaluate the patient. Exam performed, clots expressed, orders received to give Cytotec. Cytotec given by Lisseth Fowler. Pit bag given.       1800:   SBAR report given to Gladys Sood RN

## 2019-05-23 NOTE — PROGRESS NOTES
Pt admitted for induction of labor for postdates and polyhydramnios. Progressing well towards goals stated in care plan.

## 2019-05-23 NOTE — PROGRESS NOTES
17:55- Verbal shift change report given to BENITA Valera RN (oncoming nurse) by SANNA Olson (offgoing nurse). Report included the following information SBAR, Procedure Summary, Intake/Output, MAR, Accordion and Recent Results. 18:45- RN to bedside to round. Egg crate placed on bed and linens changed. 19:20- Bedside and Verbal shift change report given to JULIANE Valencia RN (oncoming nurse) by Lou Tena (offgoing nurse). Report included the following information SBAR, Procedure Summary, Intake/Output, MAR, Accordion and Recent Results.

## 2019-05-23 NOTE — L&D DELIVERY NOTE
Delivery Summary    Patient: Yasmin Reyes MRN: [id number]  SSN: [id number]    YOB: 1990  Age: 25 y.o. Sex: female       CNM Delivery Note       Patient: Yasmin Reyes MRN: [id number]  SSN: [id number]    YOB: 1990  Age: 25 y.o. Sex: female        Complete cervical dilation at 1240. Pushing commenced at [id number],  of a live male infant at 200 with Apgars 8,9 in 6 [facility] position under no anesthesia or epidural anesthesia or IV pain medications with mother in standing position. Shoulders spontaneous, easily delivered with maternal effort. Vigorous infant placed on maternal abdomen immediately following delivery. Placenta and membranes spontaneous, intact, with 3 vessel cord via Familia Rodas mechanism at 1255. Fundus massaged to firm. Initial QBL loss 315 mL. Vagina and perineum inspected. Perineum intact. Mother and infant stable, bonding, establishing breastfeeding. Yosef Kenny, CNM present and assisted with delivery.       Information for the patient's :  Eros LawsEvelin  Miguel [[id number]]       Labor Events:    Labor: No    Steroids:     Cervical Ripening Date/Time:       Cervical Ripening Type: None   Antibiotics During Labor: Yes   Rupture Identifier:      Rupture Date/Time: 2019 12:38 PM   Rupture Type: AROM;Bulging   Amniotic Fluid Volume: Polyhydramic    Amniotic Fluid Description: Clear    Amniotic Fluid Odor: None    Induction: Membrane Stripping       Induction Date/Time: 2019 7:15 AM    Indications for Induction: Other(comment)    Augmentation: AROM   Augmentation Date/Time: 201912:38 PM   Indications for Augmentation:     Labor complications: None       Additional complications:        Delivery Events:  Indications For Episiotomy:     Episiotomy: None   Perineal Laceration(s): None   Repaired:     Periurethral Laceration Location:      Repaired:     Labial Laceration Location:     Repaired:     Sulcal Laceration Location: Repaired:     Vaginal Laceration Location:     Repaired:     Cervical Laceration Location:     Repaired:     Repair Suture: None   Number of Repair Packets:     Estimated Blood Loss (ml):  ml     Delivery Date: 2019    Delivery Time: 12:48 PM  Delivery Type: Vaginal, Spontaneous  Sex:  Male    Gestational Age: 38w9d   Delivery Clinician:  Gilda Atwood  Living Status: Living   Delivery Location: L&D            APGARS  One minute Five minutes Ten minutes   Skin color: 0   1        Heart rate: 2   2        Grimace: 2   2        Muscle tone: 2   2        Breathin   2        Totals: 8   9            Presentation: Vertex    Position: Middle Occiput Anterior  Resuscitation Method:  Suctioning-bulb; Tactile Stimulation     Meconium Stained: None      Cord Information: 3 Vessels  Complications:    Cord around:    Delayed cord clamping? Yes  Cord clamped date/time:2019 12:54 PM  Disposition of Cord Blood: Discard    Blood Gases Sent?: No    Placenta:  Date/Time: 2019 12:55 PM  Removal: Spontaneous      Appearance: Other (comment); Intact     Bath Measurements:  Birth Weight: 8 lb 4.8 oz (3.765 kg)      Birth Length: 1' 8.5\" (0.521 m)      Head Circumference: 1' 1.78\" (0.35 m)      Chest Circumference: 1' 1.78\" (0.35 m)     Abdominal Girth: 1' 2.17\" (0.36 m)    Other Providers:   SAMIR WALLS;KRISTYN ZAMUDIO;CHELLE BENJAMIN, Primary Nurse;Primary  Nurse;Midwife           Group B Strep:   Lab Results   Component Value Date/Time    GrBStrep, External POSITIVE 2019     Information for the patient's :  Jass Sweet, Male  Liseth Garcia [[id number]]   No results found for: ABORH, PCTABR, PCTDIG, BILI, ABORHEXT, ABORH    No results for input(s): PCO2CB, PO2CB, HCO3I, SO2I, IBD, PTEMPI, SPECTI, PHICB, ISITE, IDEV, IALLEN in the last 72 hours.

## 2019-05-23 NOTE — PROGRESS NOTES
CTSP for PP bleeding. Midwife pt. Just delivered 4th baby. IOL for polyhdramnios (MAGALY=30). Labored spontaneously after \"birth cocktail\", did not require pit augmentation. Progressed quickly once in active labor. Baby weighed 8.5#.  Delivered @ 12:48. Did receive pitocin PP. Has continued to have bleeding, changing pad every 15min. Ambulating without dizziness. Per midwife, Annabel Montero, ? retained placenta. PE - uterus - somewhat soft, swept manually, some clot (<50cc), no retained tissue or membranes. A&P - persistent PP bleeding, suspect just d/t atony, no retained tissue on exam.  Per midwife and RN seems to be improving now. - will give dose of cytotec 800mcg WA  - if continues with bleeding, then rec TXA.

## 2019-05-23 NOTE — PROGRESS NOTES
CNM Labor Progress Note     Patient: Kenyatta Leonard MRN: [id number]  SSN: [id number]    YOB: 1990  Age: 25 y.o. Sex: female        Subjective:   Patient coping well with contractions. Feeling more pressure / need to have bowel movement. States that contractions are getting stronger. Objective:     Patient Vitals for the past 4 hrs:   Temp Pulse Resp BP   19 1106 97.8 °F (36.6 °C) 77 14 128/73   19 1013  92 14 129/81   19 0920  78 14 135/85   19 0803  93 14 125/72       Fetal heart baseline 135 , mod variability, + accelerations, - decelerations. Uterine contractions q 3-5 minutes, strong to palpation, resting tone soft,   Sterile Vaginal Exam: 8 cm dilated/ 90 % effaced/ ballotable, cervix position mid , fetal presentation vertex, membranes intact - bulging        Assessment:   Intrauterine pregnancy at term  Category 1 fetal heart rate tracing   Labor       Plan:   Continue current orders/management   Discussed AROM with client - deferred at this time.   CNM management   Anticipate AIRAM Cross Eng

## 2019-05-24 PROBLEM — Z34.90 PREGNANCY: Status: RESOLVED | Noted: 2018-03-13 | Resolved: 2019-05-24

## 2019-05-24 PROBLEM — O43.109: Status: RESOLVED | Noted: 2019-05-23 | Resolved: 2019-05-24

## 2019-05-24 PROBLEM — O40.9XX0: Status: RESOLVED | Noted: 2019-05-23 | Resolved: 2019-05-24

## 2019-05-24 PROBLEM — O40.3XX0 POLYHYDRAMNIOS AFFECTING PREGNANCY IN THIRD TRIMESTER: Status: RESOLVED | Noted: 2019-05-23 | Resolved: 2019-05-24

## 2019-05-24 PROCEDURE — 65270000029 HC RM PRIVATE

## 2019-05-24 PROCEDURE — 74011250637 HC RX REV CODE- 250/637: Performed by: MIDWIFE

## 2019-05-24 RX ADMIN — IBUPROFEN 800 MG: 800 TABLET ORAL at 23:18

## 2019-05-24 RX ADMIN — IBUPROFEN 800 MG: 800 TABLET ORAL at 14:11

## 2019-05-24 RX ADMIN — IBUPROFEN 800 MG: 800 TABLET ORAL at 05:52

## 2019-05-24 NOTE — LACTATION NOTE
This note was copied from a baby's chart. Mother states baby has been latching on and breastfeeding well. This is her 4th baby to breast feed. Pt will successfully establish breastfeeding by feeding in response to infant's early feeding cues and/or to offer breast every 2-3 hours. Ways to obtain a deep latch and seek comfortable positioning shared, aware to keep log of feedings/output. Encouraged mom to attempt feeding with baby led feeding cues. Just as sucking on fingers, rooting, mouthing. Looking for 8-12 feedings in 24 hours. Don't limit baby at breast, allow baby to come of breast on it's own. Baby may want to feed  often and may increase number of feedings on second day of life. Skin to skin encouraged. If baby doesn't nurse,  Mom should  hand express  10-20 drops of colostrum and drip into baby's mouth, or give to baby by finger feeding, cup feeding, or spoon feeding at least every 2-3 hours. Discussed with mother her plan for feeding. Reviewed the benefits of exclusive breast milk feeding during the hospital stay. Informed her of the risks of using formula to supplement in the first few days of life as well as the benefits of successful breast milk feeding; referred her to the Breastfeeding booklet about this information. She acknowledges understanding of information reviewed and states that it is her plan to breastfeed her infant. Will support her choice and offer additional information as needed. Pt will successfully establish breastfeeding by feeding in response to early feeding cues   or wake every 3h, will obtain deep latch, and will keep log of feedings/output. Taught to BF at hunger cues and or q 2-3 hrs and to offer 10-20 drops of hand expressed colostrum at any non-feeds.       Breast Assessment  Left Breast: Medium  Left Nipple: Everted, Intact  Right Breast: Medium  Right Nipple: Everted, Intact  Breast- Feeding Assessment  Attends Breast-Feeding Classes: No  Breast-Feeding Experience: Yes(Breast fed 3 other children from 6 months to 2.5 yrs)  Breast Trauma/Surgery: No  Type/Quality: Good  Lactation Consultant Visits  Breast-Feedings: (Mother states baby cluster fed today. He is currently slee;ing in bassinet. Reviewed feeding cues. Mother to call [facility] for breastfeeding assistance)     Mother given LC#/ breastfeeding support group info.

## 2019-05-24 NOTE — ROUTINE PROCESS
Bedside and Verbal shift change report given to Vaughn Aponte RN (oncoming nurse) by Ismael John RN (offgoing nurse). Report included the following information SBAR.

## 2019-05-24 NOTE — PROGRESS NOTES
0700 verbal and bedside report received from Marine Galloway RN. Patient care assumed at this time. 0745 Patient in the shower. Will return for assessment. 0840 Morning assessment completed. No complaints. Patient just finished showering. Up ad luz marina. IV removed. 1058 Patient doing well. Denies any needs at this time. 1220 Patient doing well. Denies any needs. 1411 Lactation at bedside. Motrin given. 1443 TRANSFER - OUT REPORT:    Verbal report given to KALI Russell RN (name) on Trang You  being transferred to MIU (unit) for routine progression of care       Report consisted of patients Situation, Background, Assessment and   Recommendations(SBAR). Information from the following report(s) SBAR, Kardex, Intake/Output, MAR, Accordion, Recent Results and Med Rec Status was reviewed with the receiving nurse. Lines:       Opportunity for questions and clarification was provided. Patient transported with:   Registered Nurse     Baby bands verified with RN and patient.

## 2019-05-24 NOTE — PROGRESS NOTES
CNMPostpartumNoteDay1  S: Patient ambulating and voiding without difficulty. Patient happy with birth experience. Unhappy about manual removal of POC post delivery. Breastfeeding well. No complaints. Describes bleeding as lighter than a heavy day of her period.      O: Vital signs stable, Heart RRR without murmur, Lungs CTA bilaterally, FF below umbilicus, lochia rubra light or moderate, breasts soft, nipples intact, no edema     A: Postpartum Day 1  Breastfeeding  A pos/Rubella immune/GBS negative     P: Continue current postpartum orders  Lactation consult/support   Anticipate discharge tomorrow  Offer Tdap if has not had this pregnancy

## 2019-05-25 VITALS
WEIGHT: 178 LBS | DIASTOLIC BLOOD PRESSURE: 58 MMHG | HEART RATE: 83 BPM | OXYGEN SATURATION: 99 % | RESPIRATION RATE: 14 BRPM | HEIGHT: 61 IN | SYSTOLIC BLOOD PRESSURE: 103 MMHG | BODY MASS INDEX: 33.61 KG/M2 | TEMPERATURE: 98.3 F

## 2019-05-25 PROCEDURE — 74011250637 HC RX REV CODE- 250/637: Performed by: MIDWIFE

## 2019-05-25 RX ADMIN — IBUPROFEN 800 MG: 800 TABLET ORAL at 08:38

## 2019-05-25 NOTE — ROUTINE PROCESS
Discharge instructions given to patient including but not limited to signs and symptoms and who to call; restrictions and limitations; postpartum depression. All questions answered. Discharge supplies given to parents.

## 2019-05-25 NOTE — DISCHARGE SUMMARY
CNMPostpartumNoteDay2- Discharge     S: Patient ambulating and voiding without difficulty. Breastfeeding well. No complaints. Ready to go home. They are moving in 5 weeks to [place].      O: Vital signs stable, Heart RRR without murmur, Lungs CTA bilaterally, FF below umbilicus, lochia rubra light, breasts soft, nipples intact, minimal edema     A: Postpartum Day 2- , intact perineum  Breastfeeding  Stable     P: Follow routine postpartum discharge instructions  Discharge home with baby  Ibuprofen OTC up to 600 mg every 6 hours as needed for pain or cramping  Continue PNV while breastfeeding  Continue stool softner until comfortable with bowel movements  Follow-up with CNM in 4 weeks as directed

## 2019-05-25 NOTE — PROGRESS NOTES
CNMPostpartumNoteDay2- Discharge     S: Patient ambulating and voiding without difficulty. Breastfeeding well. No complaints. Ready to go home.       O: Vital signs stable, Heart RRR without murmur, Lungs CTA bilaterally, FF below umbilicus, lochia rubra light, breasts soft, nipples intact, no edema     A: Postpartum Day 2- , intact perineum  Breastfeeding  Stable     P: Follow routine postpartum discharge instructions  Discharge home with baby  Ibuprofen OTC up to 600 mg every 6 hours as needed for pain or cramping  Continue PNV while breastfeeding  Continue stool softner until comfortable with bowel movements  Follow-up with CNM in 4 weeks as directed

## 2019-05-25 NOTE — DISCHARGE INSTRUCTIONS
POST DELIVERY DISCHARGE INSTRUCTIONS    Name: Mars Snyder  YOB: 1990  Primary Diagnosis: Active Problems:    Postpartum care following vaginal delivery (2019)        General:     Diet/Diet Restrictions:  Eight 8-ounce glasses of fluid daily (water, juices); avoid excessive caffeine intake. Meals/snacks as desired which are high in fiber and carbohydrates and low in fat and cholesterol. Physical Activity / Restrictions / Safety:     Avoid heavy lifting, no more that 8 lbs. For 2-3 weeks; limit use of stairs to 2 times daily for the first week home. No driving for one week. Avoid intercourse 4-6 weeks, no douching or tampon use. Check with obstetrician before starting or resuming an exercise program.         Discharge Instructions/Special Treatment/Home Care Needs:     Continue prenatal vitamins. Continue to use squirt bottle with warm water on your episiotomy after each bathroom use until bleeding stops. If steri-strips applied to your incision, remove in 7-10 days. Call your doctor for the following:     Fever over 101 degrees by mouth. Vaginal bleeding heavier than a normal menstrual period or clot larger than a golf ball. Red streaks or increased swelling of legs, painful red streaks on your breast.  Painful urination, constipation and increased pain or swelling or discharge with your incision. If you feel extremely anxious or overwhelmed. If you have thoughts of harming yourself and/or your baby. Pain Management:     Pain Management:   Take Acetaminophen (Tylenol) or Ibuprofen (Advil, Motrin), as directed for pain. Use a warm Sitz bath 3 times daily to relieve episiotomy or hemorrhoidal discomfort. Heating pad to  incision as needed. For hemorrhoidal discomfort, use Tucks and Anusol cream as needed and directed. Follow-Up Care:      These are general instructions for a healthy lifestyle:    No smoking/ No tobacco products/ Avoid exposure to second hand smoke    Surgeon General's Warning:  Quitting smoking now greatly reduces serious risk to your health. Obesity, smoking, and sedentary lifestyle greatly increases your risk for illness    A healthy diet, regular physical exercise & weight monitoring are important for maintaining a healthy lifestyle    Recognize signs and symptoms of STROKE:    F-face looks uneven    A-arms unable to move or move unevenly    S-speech slurred or non-existent    T-time-call 911 as soon as signs and symptoms begin-DO NOT go       Back to bed or wait to see if you get better-TIME IS BRAIN.

## 2019-05-29 ENCOUNTER — TELEPHONE (OUTPATIENT)
Dept: OBGYN CLINIC | Age: 29
End: 2019-05-29

## 2019-05-29 NOTE — TELEPHONE ENCOUNTER
PP follow-up phone call:  Spoke to patient on the phone. She states she is doing well. She was considering mowing the lawn today but decided to not do it since it was 95 degrees! Breastfeeding is going well. Infant gained 12 oz since delivery. Bleeding is like a light period. Denies pain. States she has a slight headache everyday. \"I think it might be from sleep deprivation. \"    Encouraged rest, and hydration. Patient will call if she has questions or concerns.

## 2019-08-08 NOTE — MR AVS SNAPSHOT
[address] [address] [address] [id number] Patient: Chrissy Villanueva MRN: [id number] Visit Information Date & Time Provider Department Dept. Phone Encounter #  
 1/29/2018  3:00 PM Yaritza Jaquez CNM [facility] [address] [id number] Upcoming Health Maintenance Date Due  
 PAP AKA CERVICAL CYTOLOGY 9/23/2009 Influenza Age 5 to Adult 8/1/2017 OB 3RD TRIMESTER TDAP 1/15/2018 Allergies as of 1/29/2018  Review Complete On: 1/29/2018 By: Yaritza Jaquez CNM Severity Noted Reaction Type Reactions Bee Sting [Sting, Bee] High 09/27/2017    Swelling Current Immunizations  Never Reviewed No immunizations on file. Not reviewed this visit Vitals BP Pulse Height(growth percentile) Weight(growth percentile) LMP BMI  
 120/80 (!) 104 5' 2\" (1.575 m) 175 lb (79.4 kg) 07/10/2017 32.01 kg/m2 OB Status Smoking Status Pregnant Former Smoker BMI and BSA Data Body Mass Index Body Surface Area 32.01 kg/m 2 1.86 m 2 Your Updated Medication List  
  
   
This list is accurate as of: 1/29/18  3:38 PM.  Always use your most recent med list.  
  
  
  
  
 PRENATAL PO Take  by mouth. Patient Instructions Weeks 30 to 32 of Your Pregnancy: Care Instructions Your Care Instructions You have made it to the final months of your pregnancy. By now, your baby is really starting to look like a baby, with hair and plump skin. As you enter the final weeks of pregnancy, the reality of having a baby may start to set in. This is the time to settle on a name, get your household in order, set up a safe nursery, and find quality  if needed. Doing these things in advance will allow you to focus on caring for and enjoying your new baby.  You may also want to have a tour of your [facility]'s labor and delivery unit to get a better idea of what to expect while you are in the hospital. 
During these last months, it is very important to take good care of yourself and pay attention to what your body needs. If your doctor says it is okay for you to work, don't push yourself too hard. Use the tips provided in this care sheet to ease heartburn and care for varicose veins. If you haven't already had the Tdap shot during this pregnancy, talk to your doctor about getting it. It will help protect your  against pertussis infection. Follow-up care is a key part of your treatment and safety. Be sure to make and go to all appointments, and call your doctor if you are having problems. It's also a good idea to know your test results and keep a list of the medicines you take. How can you care for yourself at home? Pay attention to your baby's movements · You should feel your baby move several times every day. · Your baby now turns less, and kicks and jabs more. · Your baby sleeps 20 to 45 minutes at a time and is more active at certain times of day. · If your doctor wants you to count your baby's kicks: 
¨ Empty your bladder, and lie on your side or relax in a comfortable chair. ¨ Write down your start time. ¨ Pay attention only to your baby's movements. Count any movement except hiccups. ¨ After you have counted 10 movements, write down your stop time. ¨ Write down how many minutes it took for your baby to move 10 times. ¨ If an hour goes by and you have not recorded 10 movements, have something to eat or drink and then count for another hour. If you do not record 10 movements in either hour, call your doctor. Ease heartburn · Eat small, frequent meals. · Do not eat chocolate, peppermint, or very spicy foods. Avoid drinks with caffeine, such as coffee, tea, and sodas. · Avoid bending over or lying down after meals. · Talk a short walk after you eat. · If heartburn is a problem at night, do not eat for 2 hours before bedtime. · Take antacids like Mylanta, Maalox, Rolaids, or Tums. Do not take antacids that have sodium bicarbonate. Care for varicose veins · Varicose veins are blood vessels that stretch out with the extra blood during pregnancy. Your legs may ache or throb. Most varicose veins will go away after the birth. · Avoid standing for long periods of time. Sit with your legs crossed at the ankles, not the knees. · Sit with your feet propped up. · Avoid tight clothing or stockings. Wear support hose. · Exercise regularly. Try walking for at least 30 minutes a day. Where can you learn more? Go to http://henrique-cesar.info/. Enter C967 in the search box to learn more about \"Weeks 30 to 32 of Your Pregnancy: Care Instructions. \" Current as of: March 16, 2017 Content Version: 11.4 © 3536-9248 [employer]. Care instructions adapted under license by [employer] (which disclaims liability or warranty for this information). If you have questions about a medical condition or this instruction, always ask your healthcare professional. [address] [place] [address] any warranty or liability for your use of this information. Look for L&D tour on line. [address] and delivery tour. Introducing [address]! Kevin Ford introduces [employer] patient portal. Now you can access parts of your medical record, email your doctor's office, and request medication refills online. 1. In your internet browser, go to [email or web address] 2. Click on the First Time User? Click Here link in the Sign In box. You will see the New Member Sign Up page. 3. Enter your [employer] Access Code exactly as it appears below. You will not need to use this code after youve completed the sign-up process.  If you do not sign up before the expiration date, you must request a new code. · [employer] Access Code: [email or web address] Expires: 4/15/2018  9:57 AM 
 
4. Enter the last four digits of your Social Security Number (xxxx) and Date of Birth (mm/dd/yyyy) as indicated and click Submit. You will be taken to the next sign-up page. 5. Create a [employer] ID. This will be your [employer] login ID and cannot be changed, so think of one that is secure and easy to remember. 6. Create a [employer] password. You can change your password at any time. 7. Enter your Password Reset Question and Answer. This can be used at a later time if you forget your password. 8. Enter your e-mail address. You will receive e-mail notification when new information is available in [address]. 9. Click Sign Up. You can now view and download portions of your medical record. 10. Click the Download Summary menu link to download a portable copy of your medical information. If you have questions, please visit the Frequently Asked Questions section of the [employer] website. Remember, [employer] is NOT to be used for urgent needs. For medical emergencies, dial 911. Now available from your iPhone and Android! Please provide this summary of care documentation to your next provider. Your primary care clinician is listed as NONE. If you have any questions after today's visit, please call [phone]. Medical Necessity Clause: clinically suspcious

## 2023-07-05 NOTE — PROGRESS NOTES
1615: Spoke with Munir Youngblood on call. Informed her of patient's reactive nonstress test, stated she would touch base with Dr. Delonte Denise, [facility], for her to go over strip. Awaiting return call.   1622: Received return call regarding patient status. Informed that patient is able to go home, with instructions to follow up next week in office, and to call if bleeding gets heavier. Instructed to remind patient she might see a little bleeding over the next 2-3 days as residual blood escapes vagina, but to call if it becomes bright red again. 1625: Patient given discharge instructions including reminder about red blood. Patient states it is red currently. Patient showed this nurse her current pad, which has a small amount of reddish/brownish tinged blood. Patient very concerned at this time. Nurse requesting patient to get up and pee and see how the blood looks on the toilet tissue. Patient up to void at this time. Patient showed nurse the toilet paper after wiping, which a small amount of pink blood. Nurse reiterated that pink or brown is okay for the next few days as long as it doesn't get bright red or heavier, and to please call on-call doctor if there is an issue. Patient expresses understanding. No other questions or concerns at this time. 1642: Patient discharged home. Hyponatremia